# Patient Record
Sex: MALE
[De-identification: names, ages, dates, MRNs, and addresses within clinical notes are randomized per-mention and may not be internally consistent; named-entity substitution may affect disease eponyms.]

---
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
## 2021-05-21 ENCOUNTER — HOSPITAL ENCOUNTER (EMERGENCY)
Dept: HOSPITAL 97 - ER | Age: 21
Discharge: HOME | End: 2021-05-21
Payer: COMMERCIAL

## 2021-05-21 VITALS — SYSTOLIC BLOOD PRESSURE: 115 MMHG | DIASTOLIC BLOOD PRESSURE: 89 MMHG

## 2021-05-21 VITALS — OXYGEN SATURATION: 98 % | TEMPERATURE: 99.2 F

## 2021-05-21 DIAGNOSIS — J06.9: Primary | ICD-10-CM

## 2021-05-21 DIAGNOSIS — Z20.822: ICD-10-CM

## 2021-05-21 LAB — SARS-COV-2 RNA RESP QL NAA+PROBE: NEGATIVE

## 2021-05-21 PROCEDURE — 87070 CULTURE OTHR SPECIMN AEROBIC: CPT

## 2021-05-21 PROCEDURE — 99283 EMERGENCY DEPT VISIT LOW MDM: CPT

## 2021-05-21 PROCEDURE — 87081 CULTURE SCREEN ONLY: CPT

## 2021-05-21 PROCEDURE — 0240U: CPT

## 2021-05-21 NOTE — XMS REPORT
Continuity of Care Document

                             Created on:May 21, 2021



Patient:BRODY HOBBS

Sex:Male

:2000

External Reference #:[id number]





Demographics







                          Address                   [address]



                                                    [place] [address]

 

                          Home Phone                [phone]

 

                          Mobile Phone              [phone]

 

                          Email Address             NONE

 

                          Preferred Language        English

 

                          Marital Status            Unknown

 

                          Adventism Affiliation     Unknown

 

                          Race                      Unknown

 

                          Additional Race(s)        Unavailable



                                                    White

 

                          Ethnic Group              Unknown









Author







                          Organization              [facility]

t

 

                          Address                   [address]



                                                    [place] [address]

 

                          Phone                     [phone]









Support







                Name            Relationship    Address         Phone

 

                Vanessa Temple  Sibling         Unavailable     [phone]

 

                Saranya Knight    Significant Other Unavailable     [phone]

9









Care Team Providers







                    Name                Role                Phone

 

                    Doris Agudelo Attending Clinician [phone]









Problems

This patient has no known problems.



Allergies, Adverse Reactions, Alerts

This patient has no known allergies or adverse reactions.



Medications

This patient has no known medications.



Procedures

This patient has no known procedures.



Encounters







        Start   End     Encounter Admission Attending Care    Care    Encounter 

Source



        Date/Time Date/Time Type    Type    Clinicians Facility Department ID   

   

 

        2021 Office          [facility]    [phone]

[id number] 



        14:15:47 14:45:47 Visit           [facility]  [id number]         



                                                [place]   [id number]         



                                                City    [phone]         



                                                [facility] [address]             



                                                Specialty                 



                                                Care                    







Results

This patient has no known results.

## 2021-05-21 NOTE — EDPHYS
Physician Documentation                                                                           

 [facility]                                                                 

Name: Reynold Mcmillan                                                                          

Age: 20 yrs                                                                                       

Sex: Male                                                                                         

: 2000                                                                                   

MRN: [id number]                                                                                   

Arrival Date: 2021                                                                          

Time: 19:50                                                                                       

Account#: [id number]                                                                            

Bed 24                                                                                            

Private MD:                                                                                       

ED Physician David Garcia                                                                      

HPI:                                                                                              

                                                                                             

23:25 This 20 yrs old  Male presents to ER via Ambulatory with complaints of Flu      kb  

      Symptoms.                                                                                   

23:25 The patient or guardian reports cough, flu symptoms. Onset: The symptoms/episode        kb  

      began/occurred 2 day(s) ago. Severity of symptoms: At their worst the symptoms were         

      mild, moderate, in the emergency department the symptoms are unchanged. Modifying           

      factors: The symptoms are alleviated by nothing, the symptoms are aggravated by             

      nothing. Associated signs and symptoms: Pertinent positives: diarrhea, rhinorrhea, sore     

      throat. The patient has not experienced similar symptoms in the past. The patient has       

      not recently seen a physician. Pt reports cough, rhinorrhea, diarrhea and sore throat       

      for 2 days. Son diagnosed with the flu and spouse with a URI.                               

                                                                                                  

Historical:                                                                                       

- Allergies:                                                                                      

19:55 No Known Allergies;                                                                     vg1 

- Home Meds:                                                                                      

19:55 None [Active];                                                                          vg1 

- PMHx:                                                                                           

19:55 None;                                                                                   vg1 

- PSHx:                                                                                           

19:55 Appendectomy;                                                                           vg1 

                                                                                                  

- Immunization history:: Adult Immunizations up to date.                                          

- Social history:: Smoking status: Patient denies any tobacco usage or history of.                

                                                                                                  

                                                                                                  

ROS:                                                                                              

23:26 Constitutional: Negative for fever, chills, and weight loss.                            kb  

23:26 ENT: Positive for rhinorrhea, sinus congestion, sore throat.                                

23:26 Respiratory: Positive for cough, Negative for dyspnea on exertion, hemoptysis,              

      orthopnea, pleurisy, shortness of breath, sputum production, wheezing.                      

23:26 Abdomen/GI: Positive for diarrhea, Negative for abdominal pain, nausea and vomiting.        

23:26 All other systems are negative.                                                             

                                                                                                  

Exam:                                                                                             

23:24 Constitutional:  This is a well developed, well nourished patient who is awake, alert,  kb  

      and in no acute distress. Head/Face:  Normocephalic, atraumatic. ENT:  Moist Mucous         

      membranes Cardiovascular:  Regular rate and rhythm with a normal S1 and S2.  No             

      gallops, murmurs, or rubs.  No pulse deficits. Respiratory:  Respirations even and          

      unlabored. No increased work of breathing, no retractions or nasal flaring. Skin:           

      Warm, dry with normal turgor.  Normal color. MS/ Extremity:  Pulses equal, no cyanosis.     

       Neurovascular intact.  Full, normal range of motion. Neuro:  Awake and alert, GCS 15,      

      oriented to person, place, time, and situation. Moves all extremities. Normal gait.         

      Psych:  Awake, alert, with orientation to person, place and time.  Behavior, mood, and      

      affect are within normal limits.                                                            

23:24 ENT: Posterior pharynx: is normal.                                                          

                                                                                                  

Vital Signs:                                                                                      

19:53  / 83; Pulse 86; Resp 18; Temp 99.2; Pulse Ox 98% on R/A; Weight 115.67 kg;       vg1 

      Height 5 ft. 8 in. (172.72 cm); Pain 8/10;                                                  

21:00  / 75; Pulse 80; Resp 16; Pulse Ox 98% ;                                          rr5 

22:00  / 89; Pulse 86; Resp 17; Pulse Ox 98% ;                                          rr5 

19:53 Body Mass Index 38.77 (115.67 kg, 172.72 cm)                                            vg1 

                                                                                                  

MDM:                                                                                              

19:54 Patient medically screened.                                                             kb  

23:24 Data reviewed: vital signs, nurses notes. Data interpreted: Pulse oximetry: on room air kb  

      is 98 %. Interpretation: normal. Counseling: I had a detailed discussion with the           

      patient and/or guardian regarding: the historical points, exam findings, and any            

      diagnostic results supporting the discharge/admit diagnosis, lab results, the need for      

      outpatient follow up, a family practitioner, to return to the emergency department if       

      symptoms worsen or persist or if there are any questions or concerns that arise at home.    

                                                                                                  

                                                                                             

19:54 Order name: Strep; Complete Time: 22:14                                                 kb  

                                                                                             

21:15 Order name: COVID-19/FLU A+B; Complete Time: 21:19                                      EDMS

                                                                                             

22:13 Order name: Throat Culture                                                              EDMS

                                                                                                  

Administered Medications:                                                                         

No medications were administered                                                                  

                                                                                                  

                                                                                                  

Disposition:                                                                                      

                                                                                             

04:14 Co-signature as Attending Physician, David Garcia MD.                                 mh7 

                                                                                                  

Disposition:                                                                                      

21 22:15 Discharged to Home. Impression: Acute upper respiratory infection, unspecified.    

- Condition is Stable.                                                                            

- Discharge Instructions: Upper Respiratory Infection, Adult, Easy-to-Read, Viral                 

  Respiratory Infection, Easy-To-Read.                                                            

                                                                                                  

- Medication Reconciliation Form, Thank You Letter, Antibiotic Education, Prescription            

  Opioid Use form.                                                                                

- Follow up: Emergency Department; When: As needed; Reason: Worsening of condition.               

  Follow up: Private Physician; When: 2 - 3 days; Reason: Recheck today's complaints,             

  Continuance of care, Re-evaluation by your physician.                                           

                                                                                                  

                                                                                                  

                                                                                                  

Signatures:                                                                                       

Dispatcher [employer]                           [facility]                                                 

Sejal Priest, ZULEIKAP-C                 FNP-Mohit Harrison, RN                      RN   rr5                                                  

Nai Diamond RN                    RN   vg1                                                  

David Garcia MD MD   mh7                                                  

                                                                                                  

Corrections: (The following items were deleted from the chart)                                    

                                                                                             

20:34 19:54 CORONAVIRUS+MR.LAB.BRZ ordered. [facility]

20:35 19:54 Influenza Screen (A \T\ B)+BA.LAB.BRZ ordered. [facility]

22:22 22:15 2021 22:15 Discharged to Home. Impression: Acute upper respiratory          rr5 

      infection, unspecified. Condition is Stable. Forms are Medication Reconciliation Form,      

      Thank You Letter, Antibiotic Education, Prescription Opioid Use. Follow up: Emergency       

      Department; When: As needed; Reason: Worsening of condition. Follow up: Private             

      Physician; When: 2 - 3 days; Reason: Recheck today's complaints, Continuance of care,       

      Re-evaluation by your physician. kb                                                         

                                                                                                  

**************************************************************************************************

## 2021-08-12 ENCOUNTER — HOSPITAL ENCOUNTER (EMERGENCY)
Dept: HOSPITAL 97 - ER | Age: 21
Discharge: LEFT BEFORE DISCHARGE | End: 2021-08-12
Payer: SELF-PAY

## 2021-08-12 DIAGNOSIS — Z02.9: Primary | ICD-10-CM

## 2021-08-12 NOTE — ER
Nurse's Notes                                                                                     

 [facility]                                                                 

Name: Reynold Mcmillan                                                                          

Age: 20 yrs                                                                                       

Sex: Male                                                                                         

: 2000                                                                                   

MRN: [id number]                                                                                   

Arrival Date: 2021                                                                          

Time: 18:56                                                                                       

Account#: [id number]                                                                            

Bed Waiting                                                                                       

Private MD:                                                                                       

Diagnosis:                                                                                        

                                                                                                  

Presentation:                                                                                     

                                                                                             

19:51 Note registration reports pt left ED.                                                   bb  

                                                                                                  

ED Course:                                                                                        

18:56 Patient arrived in ED.                                                                  as  

                                                                                                  

Administered Medications:                                                                         

No medications were administered                                                                  

                                                                                                  

                                                                                                  

Outcome:                                                                                          

19:51 Patient left the ED.                                                                    bb  

                                                                                                  

Signatures:                                                                                       

Amie Oliver Brenda, RN                     RN   bb                                                   

                                                                                                  

**************************************************************************************************

## 2021-08-12 NOTE — XMS REPORT
Continuity of Care Document

                           Created on:2021



Patient:BRODY HOBBS

Sex:Male

:2000

External Reference #:[id number]





Demographics







                          Address                   [address] [place] [address]

 

                          Home Phone                [phone]

 

                          Mobile Phone              [phone]

 

                          Email Address             [address]

 

                          Preferred Language        English

 

                          Marital Status            Unknown

 

                          Worship Affiliation     Unknown

 

                          Race                      Unknown

 

                          Additional Race(s)        White



                                                    Unavailable

 

                          Ethnic Group              Unknown









Author







                          Organization              [facility]

t

 

                          Address                   [address]



                                                    [place] [address]

 

                          Phone                     [phone]









Support







                Name            Relationship    Address         Phone

 

                Vanessa Temple  Sibling         Unavailable     [phone]

 

                Saranya Knight    Significant Other Unavailable     [phone]

9









Care Team Providers







                    Name                Role                Phone

 

                    Doris Agudelo Attending Clinician [phone]









Problems

This patient has no known problems.



Allergies, Adverse Reactions, Alerts

This patient has no known allergies or adverse reactions.



Medications

This patient has no known medications.



Procedures

This patient has no known procedures.



Encounters







        Start   End     Encounter Admission Attending Care    Care    Encounter 

Source



        Date/Time Date/Time Type    Type    Clinicians Facility Department ID   

   

 

        2021 Office          [facility]    [phone]

[id number] 



        14:15:47 14:45:47 Visit           [id number]  [id number]         



                                                [place]   [id number]         



                                                City    [phone]         



                                                [address]             



                                                Specialty                 



                                                Care                    







Results

This patient has no known results.

## 2022-06-16 ENCOUNTER — HOSPITAL ENCOUNTER (EMERGENCY)
Dept: HOSPITAL 97 - ER | Age: 22
Discharge: HOME | End: 2022-06-16
Payer: SELF-PAY

## 2022-06-16 VITALS — TEMPERATURE: 99.1 F | OXYGEN SATURATION: 99 %

## 2022-06-16 DIAGNOSIS — U07.1: Primary | ICD-10-CM

## 2022-06-16 PROCEDURE — 71045 X-RAY EXAM CHEST 1 VIEW: CPT

## 2022-06-16 PROCEDURE — 87804 INFLUENZA ASSAY W/OPTIC: CPT

## 2022-06-16 PROCEDURE — 99282 EMERGENCY DEPT VISIT SF MDM: CPT

## 2022-06-16 PROCEDURE — 93005 ELECTROCARDIOGRAM TRACING: CPT

## 2022-06-16 NOTE — RAD REPORT
EXAM DESCRIPTION:  RAD - Chest Single View - 6/16/2022 2:10 pm

 

CLINICAL HISTORY:  Congestion

 

COMPARISON:  Chest Pa And Lat (2 Views) dated 1/30/2019

 

FINDINGS:  Lines: None.

Lungs: No evidence of edema or pneumonia.

Pleural: No significant pleural effusions or pneumothorax.

Cardiac: The heart size is within normal limits.

Bones: No acute fractures.

Other:

 

IMPRESSION:  No acute cardiopulmonary disease.

## 2022-06-16 NOTE — EDPHYS
Physician Documentation                                                                           

 [facility]                                                                 

Name: Reynold Mcmillan                                                                          

Age: 21 yrs                                                                                       

Sex: Male                                                                                         

: 2000                                                                                   

MRN: [id number]                                                                                   

Arrival Date: 2022                                                                          

Time: 12:58                                                                                       

Account#: [id number]                                                                            

Bed 11                                                                                            

Private MD:                                                                                       

ED Physician Ronald London                                                                         

HPI:                                                                                              

                                                                                             

14:08 This 21 yrs old  Male presents to ER via Ambulatory with complaints of          kb  

      Shortness Of Breath, Chest Pressure.                                                        

14:08 The patient has shortness of breath at rest. Onset: The symptoms/episode began/occurred kb  

      yesterday. Duration: The symptoms are continuous. The patient's shortness of breath has     

      no apparent modifying factors. Associated signs and symptoms: Pertinent positives:          

      chest pain, non-productive cough. Severity of symptoms: At their worst the symptoms         

      were moderate in the emergency department the symptoms are unchanged. The patient has       

      not experienced similar symptoms in the past. The patient has not recently seen a           

      physician. Pt reports cough, congestion, chest pain and shortness of breath that            

      started yesterday.                                                                          

                                                                                                  

Historical:                                                                                       

- Allergies:                                                                                      

13:07 No Known Allergies;                                                                     iw  

- Home Meds:                                                                                      

13:07 None [Active];                                                                          iw  

- PMHx:                                                                                           

13:07 None;                                                                                   iw  

- PSHx:                                                                                           

13:07 Appendectomy;                                                                           iw  

                                                                                                  

- Immunization history:: Adult Immunizations unknown.                                             

- Social history:: Smoking status: Reported history of juuling and/or vaping.                     

                                                                                                  

                                                                                                  

ROS:                                                                                              

14:06 Constitutional: Negative for fever, chills, and weight loss.                            kb  

14:06 ENT: Positive for sinus congestion.                                                         

14:06 Cardiovascular: Positive for chest pain, Negative for edema, orthopnea, palpitations,       

      paroxysmal nocturnal dyspnea.                                                               

14:06 Respiratory: Positive for cough, shortness of breath.                                       

14:06 All other systems are negative.                                                             

                                                                                                  

Exam:                                                                                             

14:05 Constitutional:  This is a well developed, well nourished patient who is awake, alert,  kb  

      and in no acute distress. Head/Face:  Normocephalic, atraumatic. ENT:  Moist Mucous         

      membranes Cardiovascular:  Regular rate and rhythm with a normal S1 and S2.  No             

      gallops, murmurs, or rubs.  No pulse deficits. Respiratory:  Respirations even and          

      unlabored. No increased work of breathing. Talking in full sentences Abdomen/GI:  Soft,     

      non-tender. No distention Skin:  Warm, dry with normal turgor.  Normal color. MS/           

      Extremity:  Pulses equal, no cyanosis.  Neurovascular intact.  Full, normal range of        

      motion. Neuro:  Awake and alert, GCS 15, oriented to person, place, time, and               

      situation. Moves all extremities. Normal gait. Psych:  Awake, alert, with orientation       

      to person, place and time.  Behavior, mood, and affect are within normal limits.            

14:05 ECG was reviewed by the Attending Physician.                                                

                                                                                                  

Vital Signs:                                                                                      

13:08 Pulse 90; Resp 16; Temp 99.1; Pulse Ox 99% on R/A;                                      iw  

                                                                                                  

MDM:                                                                                              

13:10 Patient medically screened.                                                             kb  

14:06 Data reviewed: vital signs, nurses notes. Data interpreted: Pulse oximetry: on room air kb  

      is 99 %. Interpretation: normal.                                                            

14:20 Counseling: I had a detailed discussion with the patient and/or guardian regarding: the kb  

      historical points, exam findings, and any diagnostic results supporting the                 

      discharge/admit diagnosis, lab results, radiology results, the need for outpatient          

      follow up, a family practitioner, to return to the emergency department if symptoms         

      worsen or persist or if there are any questions or concerns that arise at home.             

                                                                                                  

                                                                                             

13:09 Order name: Flu; Complete Time: 13:51                                                   iw  

                                                                                             

13:09 Order name: SARS-COV-2 RT PCR (Document "Date of Onset" if Symptomatic); Complete Time: iw  

      14:20                                                                                       

                                                                                             

13:16 Order name: EKG - Nurse/Tech; Complete Time: 14:05                                      zm  

                                                                                             

13:18 Order name: CXR XRAY; Complete Time: 14:39                                              iw  

                                                                                                  

EC:05 Rate is 79 beats/min. Rhythm is regular. QRS Axis is Normal. [place] interval is normal at   kb  

      134 msec. QRS interval is normal at 82 msec. QT interval is normal at 394 msec.             

                                                                                                  

Administered Medications:                                                                         

No medications were administered                                                                  

                                                                                                  

                                                                                                  

Disposition:                                                                                      

19:53 Co-signature as Attending Physician, Ronald London DO I was immediately available on-site ms3 

      in the Emergency Department for consultation in the care of the patient..                   

                                                                                                  

Disposition Summary:                                                                              

22 14:47                                                                                    

Discharge Ordered                                                                                 

      Location: Home                                                                          kb  

      Condition: Stable                                                                       kb  

      Diagnosis                                                                                   

        - Coronavirus infection, unspecified                                                  kb  

      Followup:                                                                               kb  

        - With: Emergency Department                                                               

        - When: As needed                                                                          

        - Reason: Worsening of condition                                                           

      Followup:                                                                               kb  

        - With: Private Physician                                                                  

        - When: 2 - 3 days                                                                         

        - Reason: Recheck today's complaints, Continuance of care, Re-evaluation by your           

      physician                                                                                   

      Discharge Instructions:                                                                     

        - Discharge Summary Sheet                                                             kb  

        - Viral Respiratory Infection, Easy-To-Read                                           kb  

        - COVID-19                                                                            kb  

      Forms:                                                                                      

        - Medication Reconciliation Form                                                      kb  

        - Thank You Letter                                                                    kb  

        - Antibiotic Education                                                                kb  

        - Prescription Opioid Use                                                             kb  

        - Work release form                                                                   aa5 

Signatures:                                                                                       

[employer]                           EDSejal Hobson, TANIKA HERNANDES-Aicha Hall RN                     RN   iw                                                   

Ronald London DO DO   ms3                                                  

Karon Oliver                                                   

                                                                                                  

Corrections: (The following items were deleted from the chart)                                    

13:08 13:07 PSHx: None; iw                                                                    iw  

                                                                                                  

**************************************************************************************************

## 2022-06-16 NOTE — ER
Nurse's Notes                                                                                     

 [facility]                                                                 

Name: Reynold Mcmillan                                                                          

Age: 21 yrs                                                                                       

Sex: Male                                                                                         

: 2000                                                                                   

MRN: [id number]                                                                                   

Arrival Date: 2022                                                                          

Time: 12:58                                                                                       

Account#: [id number]                                                                            

Bed 11                                                                                            

Private MD:                                                                                       

Diagnosis: Coronavirus infection, unspecified                                                     

                                                                                                  

Presentation:                                                                                     

                                                                                             

13:07 Chief complaint: Patient states: is having chest pains and SOB, started yesterday , +   iw  

      cough and congestion, last night felt sweaty. Coronavirus screen: Client presents with      

      at least one sign or symptom that may indicate coronavirus-19. Ebola Screen: Patient        

      negative for fever greater than or equal to 101.5 degrees Fahrenheit, and additional        

      compatible Ebola Virus Disease symptoms Patient denies exposure to infectious person.       

      Patient denies travel to an Ebola-affected area in the 21 days before illness onset. No     

      symptoms or risks identified at this time. Initial Sepsis Screen: Does the patient meet     

      any 2 criteria? No. Patient's initial sepsis screen is negative. Does the patient have      

      a suspected source of infection? No. Patient's initial sepsis screen is negative. Risk      

      Assessment: Do you want to hurt yourself or someone else? Patient reports no desire to      

      harm self or others. Onset of symptoms was Faith 15, 2022.                                   

13:07 Method Of Arrival: Ambulatory                                                           iw  

13:08 Acuity: ROSIE 3                                                                           iw  

                                                                                                  

Triage Assessment:                                                                                

15:00 General: Appears in no apparent distress. Behavior is calm, cooperative. Respiratory:   iw  

      Reports cough that is Onset: The symptoms/episode began/occurred yesterday, the patient     

      has mild shortness of breath.                                                               

                                                                                                  

Historical:                                                                                       

- Allergies:                                                                                      

13:07 No Known Allergies;                                                                     iw  

- Home Meds:                                                                                      

13:07 None [Active];                                                                          iw  

- PMHx:                                                                                           

13:07 None;                                                                                   iw  

- PSHx:                                                                                           

13:07 Appendectomy;                                                                           iw  

                                                                                                  

- Immunization history:: Adult Immunizations unknown.                                             

- Social history:: Smoking status: Reported history of juuling and/or vaping.                     

                                                                                                  

                                                                                                  

Screening:                                                                                        

15:00 Abuse screen: Denies threats or abuse. Denies injuries from another. Nutritional        iw  

      screening: No deficits noted. Tuberculosis screening: No symptoms or risk factors           

      identified. Fall Risk None identified.                                                      

                                                                                                  

Assessment:                                                                                       

15:10 Reassessment: Patient is alert, oriented x 3, equal unlabored respirations, skin        aa5 

      warm/dry/pink.                                                                              

15:10 Pain: Denies pain. Cardiovascular: Rhythm is regular. Respiratory: Airway is patent     iw  

      Respiratory effort is even, Breath sounds are clear bilaterally.                            

                                                                                                  

Vital Signs:                                                                                      

13:08 Pulse 90; Resp 16; Temp 99.1; Pulse Ox 99% on R/A;                                      iw  

                                                                                                  

ED Course:                                                                                        

12:58 Patient arrived in ED.                                                                  as  

12:59 [facility] Priest [facility]C [facility]                                                        kb  

12:59 Ronald London DO is Attending Physician.                                                kb  

13:08 Triage completed.                                                                       iw  

13:08 Arm band placed on.                                                                     iw  

13:50 Patient has correct armband on for positive identification.                             iw  

13:54 Aicha López, RN is Primary Nurse.                                                   iw  

14:12 CXR XRAY In Process Unspecified.                                                        EDMS

15:10 No provider procedures requiring assistance completed. Patient did not have IV access   aa5 

      during this emergency room visit.                                                           

                                                                                                  

Administered Medications:                                                                         

No medications were administered                                                                  

                                                                                                  

                                                                                                  

Medication:                                                                                       

16:10 VIS not applicable for this client.                                                     iw  

                                                                                                  

Outcome:                                                                                          

14:47 Discharge ordered by MD.                                                                kb  

15:10 Discharged to home ambulatory.                                                          aa5 

15:10 Condition: good                                                                             

15:10 Discharge instructions given to patient, Instructed on discharge instructions, follow       

      up and referral plans. Demonstrated understanding of instructions, follow-up care.          

15:12 Patient left the ED.                                                                    iw  

                                                                                                  

Signatures:                                                                                       

[employer]                           EDMS                                                 

Sejal Priest FNP-C FNP-Amie Wadsworth                             as                                                   

Aicha López, RN                     RN   iw                                                   

Tereza Gonsales, RN                     RN   aa5                                                  

                                                                                                  

Corrections: (The following items were deleted from the chart)                                    

13:08 13:07 PSHx: None; iw                                                                    iw  

                                                                                                  

**************************************************************************************************

## 2022-06-16 NOTE — XMS REPORT
Continuity of Care Document

                            Created on:2022



Patient:BRODY HOBBS

Sex:Male

:2000

External Reference #:[id number]





Demographics







                          Address                   [address] 



                                                    [place] [address]

 

                          Home Phone                [phone]

 

                          Mobile Phone              [phone]

 

                          Email Address             [email or web address]

 

                          Preferred Language        English

 

                          Marital Status            Unknown

 

                          Spiritism Affiliation     Unknown

 

                          Race                      Unknown

 

                          Additional Race(s)        White



                                                    Unavailable

 

                          Ethnic Group              Unknown









Author







                          Organization              [facility]

t

 

                          Address                   [address]



                                                    [place] [address]

 

                          Phone                     [phone]









Support







                Name            Relationship    Address         Phone

 

                Vanessa Temple  Sibling         Unavailable     [phone]

 

                Saranya Knight    Significant Other Unavailable     [phone]

9









Care Team Providers







                    Name                Role                Phone

 

                    Elena Cole Attending Clinician [phone]

 

                    Rob SCHUMACHER       Attending Clinician [phone]

 

                    ROB           Attending Clinician Unavailable

 

                    Vahe COLES           Attending Clinician [phone]

 

                    ZACK            Attending Clinician Unavailable

 

                    Ulisses FNP,  A       Attending Clinician [phone]

 

                    ULISSES,  A           Attending Clinician Unavailable

 

                    Doctor Unassigned,  Name Attending Clinician Unavailable

 

                    Leonardo               Attending Clinician [phone]

 

                    Prema HERNANDES         Attending Clinician [phone]

 

                    Enoc COLES  S       Attending Clinician [phone]

 

                    Malachi COLES          Attending Clinician [phone]

 

                    PREMA             Attending Clinician Unavailable

 

                    Malachi COLES          Admitting Clinician [phone]









Problems







       Condition Condition Condition Status Onset  Resolution Last   Treating Co

mments 

Source



       Name   Details Category        Date   Date   Treatment Clinician        



                                                 Date                 

 

       Obesity Obesity Disease Active -                             [facility]



       (BMI   (BMI                 8-07                               ity of



       30-39.9) 30-39.9)               00:00:                             [place]



                                   00                                 Medical



                                                                      Branch

 

       Acute  Acute  Disease Active -0                             [facility]



       appendicit appendicit               8-06                               it

y of



       is,    is,                  00:00:                             [place]



       uncomplica uncomplica               00                                 Me

dical



       florence    florence                                                     Branch







Allergies, Adverse Reactions, Alerts







       Allergy Allergy Status Severity Reaction(s) Onset  Inactive Treating Comm

ents 

Source



       Name   Type                        Date   Date   Clinician        

 

       NO KNOWN Drug   Active                                           [facility]



       ALLERGIE Class                                                   ity of



       S                                                              [facility]







Social History







           Social Habit Start Date Stop Date  Quantity   Comments   Source

 

           Sex Assigned At                                             [facility]

y of



           Birth                                                  [facility]

 

           Exposure to                       Not sure              [facility] of



           SARS-CoV-2                                             [facility]



           (event)                                                Branch

 

           Tobacco use and 2021 Current user            [facility]

ity of



           exposure   00:00:00   00:00:00                         [facility]

 

           Tobacco Comment 2020 vapes daily,            [facility]

ity of



                      00:00:00   00:00:00   smokless tobacco            [place] [facility]

dical



                                            twice weekly per            Branch



                                            patient report            









                Smoking Status  Start Date      Stop Date       Source

 

                Current every day smoker 2021 00:00:00                 [facility]

[facility]ity [facility]







Medications







       Ordered Filled Start  Stop   Current Ordering Indication Dosage Frequency

 Signature

                    Comments            Components          Source



     Medication Medication Date Date Medication? Clinician                (SIG) 

          



     Name Name                                                   

 

     HYDROcodone      2021- No             1{tbl}      1 tablet,         

  [facility]



     -acetaminop      2-10 02-10                          Oral,           ity of



     hen (NORCO      05:15: 04:18                          ONCE, 1           [facility]

as



     5) 5-325 mg      00   :00                           dose, Tue           Med

ical



     tablet 1                                         21 at           [place]



     tablet                                         2315, ASAP           

 

     ondansetron      2021- No             4mg       4 mg, Slow          

 [facility]



     (ZOFRAN      -03                          IV Push,           ity of



     (PF))      07:30: 06:35                          ONCE, 1           [place]



     injection 4      00   :00                           dose, Wed           Med

ical



     mg                                           2/3/21 at           Branch



                                                  0130, ASAP           

 

     morpHINE      2021- No             4mg       4 mg, Slow           Un

rachid



     injection 4      03                          IV Push,           ity

 of



     mg        07:30: 06:35                          ONCE, 1           [place]



               00   :00                           dose, Wed           Medical



                                                  2/3/21 at           Branch



                                                  0130, STAT           

 

     ibuprofen            Yes       [id number] 800mg      Take 1         

  [facility]



     800 mg      2-03                [id number]           tablet by           ity of



     tablet      00:00:                               mouth           [place]



               00                                 every 8           Medical



                                                  (eight)           Branch



                                                  hours.           

 

     ibuprofen            Yes       [id number] 800mg      Take 1         

  [facility]



     800 mg      2-03                [id number]           tablet by           ity of



     tablet      00:00:                               mouth           [place]



               00                                 every 8           Medical



                                                  (eight)           Branch



                                                  hours.           

 

     ibuprofen            Yes       [id number] 800mg      Take 1         

  [facility]



     800 mg      2-03                [id number]           tablet by           ity of



     tablet      00:00:                               mouth           [place]



               00                                 every 8           Medical



                                                  (eight)           Branch



                                                  hours.           

 

     ibuprofen      -0      Yes       [id number] 800mg      Take 1         

  [facility]



     800 mg      2-03                [id number]           tablet by           ity of



     tablet      00:00:                               mouth           [place]



               00                                 every 8           Medical



                                                  (eight)           Branch



                                                  hours.           

 

     morpHINE      2020-0 2020- No             4mg       4 mg, Slow           Un

rachid



     injection 4      -08                          IV Push,           ity

 of



     mg        10:49: 11:48                          Q6HPRN,           [place]



               49   :49                           Starting           Medical



                                                  Sat 20           Branch



                                                  at 0549,           



                                                  Until Sat           



                                                  20 at           



                                                  0648,           



                                                  Routine,           



                                                  Pain           



                                                  (scale           



                                                  7-10)           

 

     morpHINE      -0 2020- No             4mg       4 mg, Slow           Un

rachid



     injection 4      -08                          IV Push,           ity

 of



     mg        04:46: 05:14                          Q6HPRN,           [place]



               55   :00                           Starting           Medical



                                                  Fri 20           Branch



                                                  at 2346,           



                                                  Until Sat           



                                                  20 at           



                                                  0014,           



                                                  Routine,           



                                                  Pain           



                                                  (scale           



                                                  7-10)           

 

     HYDROmorpho      -0 2020- No             .5mg      0.5 mg,           Un

rachid



     ne        -                          Slow IV           ity of



     (DILAUDID)      01:00: 01:07                          Push,           [place]



     injection      00   :00                           ONCE, 1           Medical



     0.5 mg                                         dose, Fri           Branch



                                                  20 at           



                                                  2000,           



                                                  Routine<br           



                                                  >Use           



                                                  approved           



                                                  by             



                                                  (Faculty):           



                                                  ADC            



                                                  PROVIDER           

 

     simethicone      -0      Yes            80mg      80 mg,           Univ

ers



     (GAS RELIEF      808                               Oral,           ity of



     (SIMETHICON      00:53:                               Q6HPRN,           [facility]

as



     E))       45                                 Starting           Medical



     chewable                                         Fri 20           Branc

h



     tablet 80                                         at 1953,           



     mg                                           Until           



                                                  Discontinu           



                                                  ed,            



                                                  Routine,           



                                                  Gas            

 

     HYDROcodone      -0      Yes       4647 1{tbl}      Take 1           Un

rachid



     -acetaminop      8-08                               tablet by           ity

 of



     hen 5-325      00:00:                               mouth           [place]



     mg tablet      00                                 every 6           Medical



                                                  (six)           Branch



                                                  hours as           



                                                  needed for           



                                                  Pain           



                                                  (scale           



                                                  7-10).           



                                                  Indication           



                                                  s: acute           



                                                  pain           

 

     ibuprofen      -0      Yes       [id number] 600mg      Take 1           U

nivers



     600 mg      8-08                               tablet by           ity of



     tablet      00:00:                               mouth           [place]



               00                                 every 6           Medical



                                                  (six)           Branch



                                                  hours as           



                                                  needed for           



                                                  Pain           



                                                  (scale           



                                                  4-6).           

 

     HYDROcodone      2020-0      Yes       4647 1{tbl}      Take 1           Un

rachid



     -acetaminop      8-08                               tablet by           ity

 of



     hen 5-325      00:00:                               mouth           [place]



     mg tablet      00                                 every 6           Medical



                                                  (six)           Branch



                                                  hours as           



                                                  needed for           



                                                  Pain           



                                                  (scale           



                                                  7-10).           



                                                  Indication           



                                                  s: acute           



                                                  pain           

 

     ibuprofen      2020-0      Yes       [id number] 600mg      Take 1           U

nivers



     600 mg      8-08                               tablet by           ity of



     tablet      00:00:                               mouth           [place]



               00                                 every 6           Medical



                                                  (six)           Branch



                                                  hours as           



                                                  needed for           



                                                  Pain           



                                                  (scale           



                                                  4-6).           

 

     HYDROcodone      2020-0      Yes       4647 1{tbl}      Take 1           Un

rachid



     -acetaminop      8-08                               tablet by           ity

 of



     hen 5-325      00:00:                               mouth           [place]



     mg tablet      00                                 every 6           Medical



                                                  (six)           Branch



                                                  hours as           



                                                  needed for           



                                                  Pain           



                                                  (scale           



                                                  7-10).           



                                                  Indication           



                                                  s: acute           



                                                  pain           

 

     ibuprofen      2020-0      Yes       [id number] 600mg      Take 1           U

nivers



     600 mg      8-08                               tablet by           ity of



     tablet      00:00:                               mouth           [place]



               00                                 every 6           Medical



                                                  (six)           Branch



                                                  hours as           



                                                  needed for           



                                                  Pain           



                                                  (scale           



                                                  4-6).           

 

     HYDROcodone      2020-0      Yes       4647 1{tbl}      Take 1           Un

rachid



     -acetaminop      8-08                               tablet by           ity

 of



     hen 5-325      00:00:                               mouth           [place]



     mg tablet      00                                 every 6           Medical



                                                  (six)           Branch



                                                  hours as           



                                                  needed for           



                                                  Pain           



                                                  (scale           



                                                  7-10).           



                                                  Indication           



                                                  s: acute           



                                                  pain           

 

     ibuprofen      2020-0      Yes       [id number] 600mg      Take 1           U

nivers



     600 mg      8-08                               tablet by           ity of



     tablet      00:00:                               mouth           [place]



               00                                 every 6           Medical



                                                  (six)           Branch



                                                  hours as           



                                                  needed for           



                                                  Pain           



                                                  (scale           



                                                  4-6).           

 

     HYDROcodone      2020-0      Yes       4647 1{tbl}      Take 1           Un

rachid



     -acetaminop      8-08                               tablet by           ity

 of



     hen 5-325      00:00:                               mouth           [place]



     mg tablet      00                                 every 6           Medical



                                                  (six)           Branch



                                                  hours as           



                                                  needed for           



                                                  Pain           



                                                  (scale           



                                                  7-10).           



                                                  Indication           



                                                  s: acute           



                                                  pain           

 

     ibuprofen      2020-0      Yes       [id number] 600mg      Take 1           U

nivers



     600 mg      8-08                               tablet by           ity of



     tablet      00:00:                               mouth           [place]



               00                                 every 6           Medical



                                                  (six)           Branch



                                                  hours as           



                                                  needed for           



                                                  Pain           



                                                  (scale           



                                                  4-6).           

 

     HYDROcodone      2020-0      Yes       4647 1{tbl}      Take 1           Un

rachid



     -acetaminop      8-08                               tablet by           ity

 of



     hen 5-325      00:00:                               mouth           [place]



     mg tablet      00                                 every 6           Medical



                                                  (six)           Branch



                                                  hours as           



                                                  needed for           



                                                  Pain           



                                                  (scale           



                                                  7-10).           



                                                  Indication           



                                                  s: acute           



                                                  pain           

 

     ibuprofen      2020-0      Yes       [id number] 600mg      Take 1           U

nivers



     600 mg      8-08                               tablet by           ity of



     tablet      00:00:                               mouth           [place]



               00                                 every 6           Medical



                                                  (six)           Branch



                                                  hours as           



                                                  needed for           



                                                  Pain           



                                                  (scale           



                                                  4-6).           

 

     HYDROcodone      2020-0      Yes       4647 1{tbl}      Take 1           Un

rachid



     -acetaminop      8-08                               tablet by           ity

 of



     hen 5-325      00:00:                               mouth           [place]



     mg tablet      00                                 every 6           Medical



                                                  (six)           Branch



                                                  hours as           



                                                  needed for           



                                                  Pain           



                                                  (scale           



                                                  7-10).           



                                                  Indication           



                                                  s: acute           



                                                  pain           

 

     ibuprofen      2020-0      Yes       [id number] 600mg      Take 1           U

nivers



     600 mg      8-08                               tablet by           ity of



     tablet      00:00:                               mouth           [place]



               00                                 every 6           Medical



                                                  (six)           Branch



                                                  hours as           



                                                  needed for           



                                                  Pain           



                                                  (scale           



                                                  4-6).           

 

     HYDROcodone      2020-0      Yes       4647 1{tbl}      Take 1           Un

rachid



     -acetaminop      8-08                               tablet by           ity

 of



     hen 5-325      00:00:                               mouth           [place]



     mg tablet      00                                 every 6           Medical



                                                  (six)           Branch



                                                  hours as           



                                                  needed for           



                                                  Pain           



                                                  (scale           



                                                  7-10).           



                                                  Indication           



                                                  s: acute           



                                                  pain           

 

     ibuprofen      2020-0      Yes       [id number] 600mg      Take 1           U

nivers



     600 mg      8-08                               tablet by           ity of



     tablet      00:00:                               mouth           [place]



               00                                 every 6           Medical



                                                  (six)           Branch



                                                  hours as           



                                                  needed for           



                                                  Pain           



                                                  (scale           



                                                  4-6).           

 

     HYDROcodone      2020-0      Yes       4647 1{tbl}      Take 1           Un

rachid



     -acetaminop      8-08                               tablet by           ity

 of



     hen 5-325      00:00:                               mouth           [place]



     mg tablet      00                                 every 6           Medical



                                                  (six)           Branch



                                                  hours as           



                                                  needed for           



                                                  Pain           



                                                  (scale           



                                                  7-10).           



                                                  Indication           



                                                  s: acute           



                                                  pain           

 

     ibuprofen      2020-0      Yes       [id number] 600mg      Take 1           U

nivers



     600 mg      8-08                               tablet by           ity of



     tablet      00:00:                               mouth           [place]



               00                                 every 6           Medical



                                                  (six)           Branch



                                                  hours as           



                                                  needed for           



                                                  Pain           



                                                  (scale           



                                                  4-6).           

 

     HYDROcodone      2020-0 2020- No        4647 1{tbl}      Take 1           U

nivers



     -acetaminop      8-08 08-08                          tablet by           it

y of



     hen 5-325      00:00: 00:00                          mouth           [place]



     mg tablet      00   :00                           every 6           Medical



                                                  (six)           Branch



                                                  hours as           



                                                  needed for           



                                                  Pain           



                                                  (scale           



                                                  7-10) for           



                                                  up to 7           



                                                  days.           



                                                  Indication           



                                                  s: acute           



                                                  pain           

 

     ibuprofen      2020-0      Yes            600mg      600 mg,           Univ

ers



     (IBU)                                     Oral, Q6H,           ity of



     tablet 600      23:00:                               First dose           [facility]

[facility]



     mg        00                                 on Fri           Medical



                                                  20 at           [facility]anch



                                                  1800,           



                                                  Until           



                                                  Discontinu           



                                                  ed,            



                                                  Routine           

 

     acetaminoph      2020-0      Yes            500mg      500 mg,           Un

rach[facility]



     en        07                               Oral, Q6H,           ity of



     (TYLENOL)      23:00:                               First dose           [facility]

[place]



     tablet 500      00                                 on Fri           [facility]



     mg                                           20 at           [facility]



                                                  1800,           



                                                  Until           



                                                  Discontinu           



                                                  ed,            



                                                  Routine           

 

     HYDROmorpho      -0 2020- No             .2mg      0.2 mg,           Un

rach[facility]



     ne                                  Slow IV           ity of



     (DILAUDID)      22:40: 22:59                          Push,           [place]



     injection      39   :26                           Q5MIN PRN,           Medi

per



     0.2 mg                                         10 doses,           Branch



                                                  Starting           



                                                  20           



                                                  at 1740,           



                                                  Until 20 at           



                                                  1759,           



                                                  Routine,           



                                                  Pain           



                                                  (scale           



                                                  7-10),           



                                                  PACU<br>Us           



                                                  e approved           



                                                  by             



                                                  (Faculty):           



                                                  PACU USE           



                                                  -ANESTHESI           



                                                  A              



                                                  SERVICE-HY           



                                                  DROMORPHON           



                                                  E              



                                                  INJECTIONS           

 

     FENTanyl PF      2020-0 2020- No             25ug      25 mcg,           Un

r[facility]



     (SUBLIMAZE                                Slow IV           ity o

f



     (PF))      21:34: 21:54                          Push,           [place]



     injection      53   :00                           Q5MIN PRN,           Medi

per



     25 mcg                                         4 doses,           Branch



                                                  Starting           



                                                  20           



                                                  at 1634,           



                                                  Until 20 at           



                                                  1654,           



                                                  Routine,           



                                                  Pain           



                                                  (scale           



                                                  7-10),           



                                                  PACU           

 

     morpHINE      2020-0 2020- No             2mg       2 mg, Slow           Un

r[facility]



     injection 2                                IV Push,           ity

 of



     mg        21:15: 04:47                          Q6HPRN,           [place]



               00   :16                           Starting           Medical



                                                  Fri 20           Branch



                                                  at 1615,           



                                                  Until 20 at           



                                                  2347,           



                                                  Routine,           



                                                  Pain           



                                                  (scale           



                                                  7-10)           

 

     traMADol      -0      Yes            100mg      100 mg,           Unive

rs



     (ULTRAM)                                     Oral,           ity of



     tablet 100      21:03:                               Q6HPRN,           [place]

[place]



     mg        48                                 Starting           Medical



                                                  20           [facility]



                                                  at 1603,           



                                                  Until           



                                                  Discontinu           



                                                  ed,            



                                                  Routine,           



                                                  Pain           



                                                  (scale           



                                                  4-6)           

 

     traMADol      2020-0      Yes            50mg      50 mg,           [facility]



     (ULTRAM)      8                               Oral,           ity of



     tablet 50      21:03:                               Q6HPRN,           [place]



     mg        36                                 Starting           Medical



                                                  20           Branch



                                                  at 1603,           



                                                  Until           



                                                  Discontinu           



                                                  ed,            



                                                  Routine,           



                                                  Pain           



                                                  (scale           



                                                  1-3)           

 

     piperacilli      2020-0 2020- No             3.375g      3.375 g,          

 [facility]



     n-tazobacta       0808                          IV             ity of



     m (ZOSYN)      10:00: 16:00                          Piggyback,           [facility]

[facility]



     3.375 g in      00   :21                           Q6H ABX,           Medic

al



     NaCl 0.9%                                         First dose           [facility]

ch



     (NS) 100 mL                                         (after           



     MINI-BAG                                         last           



                                                  reorder)           



                                                  on 20 at           



                                                  0500,           



                                                  Until           



                                                  Discontinu           



                                                  ed, 100           



                                                  mL<br>Reas           



                                                  on for           



                                                  Anti-Infec           



                                                  tive:           



                                                  Documented           



                                                  Infection<           



                                                  br>Documen           



                                                  [place]            



                                                  Infection           



                                                  Site:           



                                                  Abdominal<           



                                                  br>Duratio           



                                                  n of           



                                                  Therapy: 7           



                                                  days           

 

     NaCl 0.9%      0 2020- No             1000mL      at 125           Uni

vers



     (NS) IV                                mL/hr, IV           ity of



     infusion      05:45: 22:52                          Infusion,           [facility]

as



     1,000 mL      00   :44                           CONTINUOUS           Medic

al



                                                  , Starting           Branch



                                                  20           



                                                  at 0045,           



                                                  Until 20 at           



                                                  1752,           



                                                  Routine           

 

     ondansetron      -0      Yes            4mg       4 mg, Slow           

[facility]



     (ZOFRAN                                     IV Push,           ity of



     (PF))      05:42:                               Q6HPRN,           [place]



     injection 4      45                                 Starting           Medi

per



     mg                                           20           Branch



                                                  at 0042,           



                                                  Until           



                                                  Discontinu           



                                                  ed,            



                                                  Routine,           



                                                  Nausea and           



                                                  Vomiting           



                                                  (N/V)           

 

     morpHINE      -0 2020- No             2mg       2 mg, Slow           Un

rachid



     injection 2       08                          IV Push,           ity

 of



     mg        05:42: 21:04                          Q4HPRN,           [place]



               41   :20                           Starting           Medical



                                                  20           Branch



                                                  at 0042,           



                                                  Until 20 at           



                                                  1604,           



                                                  Routine,           



                                                  Pain           



                                                  (scale           



                                                  7-10)           

 

     piperacilli      -0 2020- No             3.375g      3.375 g,          

 [facility]



     n-tazobacta                                IV             ity of



     m (ZOSYN)      04:15: 03:58                          Piggyback,           [facility]

[facility]



     3.375 g in      00   :00                           ONCE, 1           Medica

l



     NaCl 0.9%                                         dose, Thu           Branc

h



     (NS) 100 mL                                         20 at           



     MINI-BAG                                         2315, 100           



                                                  mL<br>Reas           



                                                  on for           



                                                  Anti-Infec           



                                                  tive:           



                                                  Documented           



                                                  Infection<           



                                                  br>Documen           



                                                  [place]            



                                                  Infection           



                                                  Site:           



                                                  Abdominal<           



                                                  br>Duratio           



                                                  n of           



                                                  Therapy: 7           



                                                  days           

 

     iohexol      - 2020- No             120mL      120 mL,           Unive

rs



     (OMNIPAQUE                                Intravenou           it

y of



     350       02:45: 02:45                          s, ONCE, 1           [place]



     BULK-150      00   :00                           dose, Thu           Medica

l



     mL)                                          20 at           [facility]



     injection                                         214,           



     120 mL                                         Routine           

 

     NaCl 0.9%      2020- No             1000mL      at 999           Uni

vers



     (NS) bolus                                mL/hr,           ity of



     infusion      02:30: 04:26                          1,000 mL,           [facility]

as



     1,000 mL      00   :00                           IV             Medical



                                                  Infusion,           [place]



                                                  ONCE, 1           



                                                  dose, Thu           



                                                  20 at           



                                                  2130, ASAP           

 

     ondansetron      2020- No             4mg       4 mg, Slow          

 [facility]



     (ZOFRAN                                IV Push,           ity of



     (PF))      02:30: 01:30                          ONCE, 1           [place]



     injection 4      00   :00                           dose, Thu           Med

ical



     mg                                           20 at           [facility]



                                                  , ASAP           

 

     morpHINE      2020- No             4mg       4 mg, Slow           Un

rachid



     injection 4                                IV Push,           ity

 of



     mg        02:30: 01:30                          ONCE, 1           [place]



               00   :00                           dose, Thu           Medical



                                                  20 at           [facility]



                                                  , STAT           

 

     famotidine      2020- No             20mg      20 mg,           Univ

ers



     (PEPCID                                Slow IV           ity of



     (PF))      02:30: 01:30                          Push,           [place]



     injection      00   :00                           ONCE, 1           Medical



     20 mg                                         dose, u           [facility]anch



                                                  20 at           



                                                  2130, ASAP           







Vital Signs







             Vital Name   Observation Time Observation Value Comments     Source

 

             Systolic blood 2021-02-10 05:40:00 126 mm[Hg]                [facility]

[facility]



             pressure                                            [facility]



                                                                 [facility]

 

             Diastolic blood 2021-02-10 05:40:00 88 mm[Hg]                 [facility]

rsity of



             pressure                                            [facility]



                                                                 [facility]

 

             Heart rate   2021-02-10 05:40:00 102 /min                  [facility]

ty [facility]



                                                                 [place] [facility]



                                                                 [facility]

 

             Respiratory rate 2021-02-10 05:40:00 18 /min                   [facility]iv

ersity of



                                                                 [facility]



                                                                 [facility]

 

             Oxygen saturation in 2021-02-10 05:40:00 99 /min                   

[facility]



             Arterial blood by                                        [place] [facility]

per



             Pulse oximetry                                        [facility]

 

             Body temperature 2021-02-10 03:49:00 37.61 Debora                 [facility]

ersity of



                                                                 [facility]



                                                                 [facility]

 

             Body weight  2021-02-10 03:49:00 108.863 kg                [facility]

[facility]



                                                                 [facility]



                                                                 [facility]

 

             BMI          2021-02-10 03:49:00 37.59 kg/m2               [facility]

ty of



                                                                 [facility]



                                                                 [facility]

 

             Systolic blood 2021 08:03:00 136 mm[Hg]                [facility]iver

sity of



             pressure                                            [facility]



                                                                 Branch

 

             Diastolic blood 2021 08:03:00 91 mm[Hg]                 Unive

rsity of



             pressure                                            [facility]



                                                                 Branch

 

             Heart rate   2021 08:03:00 93 /min                   Universi

ty of



                                                                 [facility]



                                                                 Branch

 

             Respiratory rate 2021 08:03:00 18 /min                   Univ

ersity of



                                                                 [facility]



                                                                 Branch

 

             Oxygen saturation in 2021 08:03:00 99 /min                   

University of



             Arterial blood by                                        [facility]

per



             Pulse oximetry                                        Branch

 

             Body temperature 2021 05:32:00 37.28 Debora                 Univ

ersity of



                                                                 [facility]



                                                                 Branch

 

             Body height  2021 05:32:00 170.2 cm                  Universi

ty of



                                                                 [facility]



                                                                 Branch

 

             Body weight  2021 05:32:00 63.504 kg                 Universi

ty of



                                                                 [facility]



                                                                 Branch

 

             BMI          2021 05:32:00 21.93 kg/m2               Universi

ty of



                                                                 [facility]



                                                                 Branch

 

             Body temperature 2020 18:34:00 36.11 Debora                 Univ

ersity of



                                                                 [facility]



                                                                 Branch

 

             Respiratory rate 2020 18:34:00 18 /min                   Univ

ersity of



                                                                 [facility]



                                                                 Branch

 

             Body weight  2020 18:34:00 89.359 kg                 [facility]

 

             Systolic blood 2020 18:34:00 126 mm[Hg]                Univer

sity of



             pressure                                            [facility]

 

             Diastolic blood 2020 18:34:00 81 mm[Hg]                 Unive

rsity of



             [facility]

 

             Heart [facility]   2020 18:34:00 76 /min                   [facility]

[facility]

 

             Systolic blood 2020 16:02:00 112 mm[Hg]                Univer

sity of



             [facility]

 

             Diastolic blood 2020 16:02:00 57 mm[Hg]                 [facility]

[facility]



             [facility]

 

             Heart rate   2020 16:02:00 74 /min                   [facility]

 

             Body temperature 2020 16:02:00 36.61 Debora                 [facility]iv

[facility]

 

             Respiratory rate 2020 16:02:00 20 /min                   [facility]

 

             Oxygen saturation in 2020 16:02:00 99 /min                   

[facility]



             Arterial blood by                                        [place] [facility]

per



             Pulse oximetry                                        [place]

 

             Body height  2020 14:46:00 170.2 cm                  [facility]

 

             Body weight  2020 14:46:00 90.5 kg                   [facility]

 

             BMI          2020 14:46:00 31.24 kg/m2               [facility]







Procedures







                Procedure       Date / Time     Performing Clinician Source



                                Performed                       

 

                US TESTICULAR TORSION 2021-02-10 05:07:54 Elena Queen [facility]

 

                URINALYSIS      2021-02-10 04:18:00 Elena Queen [facility]

 

                US TESTICULAR TORSION 2021 08:31:51 Ifeanyi Cotter [facility]

 

                HEPATIC FUNCTION PANEL 2021 06:34:00 Ifeanyi Cotter [facility]



                ([id number]) (ALB,T.PRO,BILI                                 [facility] 

Branch



                T,BU/BC,ALT,AST,ALK                                 



                PHOS)                                           

 

                BASIC METABOLIC PANEL 2021 06:34:00 Ifeanyi Cotter [facility]



                (NA, K, CL, CO2,                                 Medical Branch



                GLUCOSE, BUN,                                   



                CREATININE, CA)                                 

 

                CBC WITH DIFF   2021 06:34:00 Ifeanyi Cotter [facility]

 

                PROTHROMBIN TIME / INR 2021 06:34:00 Ifeanyi Cotter [facility]



                                                                Medical [place]

 

                ACTIVATED PARTIAL 2021 06:34:00 Ifeanyi Cotter [facility]



                THRMPLAS SATHYA                                    Medical Branch

 

                COVID-19 (ID NOW RAPID 2021 06:34:00 Ifeanyi Cotter [facility]



                TESTING)                                        Medical Branch

 

                URINALYSIS      2021 05:40:00 Ifeanyi Cotter [facility]

 

                NOTICE OF PRIVACY 2021 05:28:30 Doctor Unassigned, No [facility]



                PRACTICES                       Name            Medical Branch

 

                CONSENT/REFUSAL FOR 2021 05:27:16 Doctor Unassigned, No [facility]

[facility]



                DIAGNOSIS AND TREATMENT                 Name            Medical 

Branch

 

                CONSENT/REFUSAL FOR 2020 18:26:52 Doctor Unassigned, No [facility]

[facility]



                DIAGNOSIS AND TREATMENT                 Name            Medical 

Branch

 

                BASIC METABOLIC PANEL 2020 11:05:00 [facility]



                (NA, K, CL, CO2,                                 Medical Branch



                GLUCOSE, BUN,                                   



                CREATININE, CA)                                 

 

                CBC WITH DIFF   2020 [facility]:05:00 [facility]

 

                CT ABDOMEN PELVIS W 2020 02:39:28 [facility]

[facility] [place]



                CONTRAST                                        Medical Branch

 

                COVID-19 (ID NOW RAPID 2020 02:28:00 [facility]



                TESTING)                                        Medical Branch

 

                URINALYSIS      2020 01:23:00 [facility]

 

                LIPASE          2020 01:22:00 [facility]

 

                HEPATIC FUNCTION PANEL 2020 01:22:00 [facility]



                ([id number]) (ALB,T.PRO,BILI                                 Medical 

Branch



                T,BU/BC,ALT,AST,ALK                                 



                PHOS)                                           

 

                BASIC METABOLIC PANEL 2020 01:22:00 [facility]



                (NA, K, CL, CO2,                                 Medical Branch



                GLUCOSE, BUN,                                   



                CREATININE, CA)                                 

 

                CBC WITH DIFF   2020 01:22:00 Lloyd, David [facility]

 

                NOTICE OF PRIVACY 2020 00:53:21 Doctor Unassigned, No Univ

[facility]



                PRACTICES                       Name            Medical Branch

 

                CONSENT/REFUSAL FOR 2020 00:52:28 Doctor Unassigned, No [facility]

[facility]



                DIAGNOSIS AND TREATMENT                 Name            Medical 

Branch







Encounters







        Start   End     Encounter Admission Attending Care    Care    Encounter 

Source



        Date/Time Date/Time Type    Type    Clinicians Facility Department ID   

   

 

        2021-10-30         Emergency                 [facility]    [id number] 

Univers



        22:41:01                                                         ity of



                                                                        [facility]

 

        2021-10-30         Emergency                 [facility]    [id number] 

Univers



        21:25:25                                                         ity of



                                                                        [facility]

 

        2021 2021-02-10 Emergency         [facility]    [id number]

[id number] Univers



        21:51:00 00:02:00                 Elena Shane [id number]         i

ty [facility] [id number] [place] [address]  [phone]         Medi

per



                                                        084             Branch

 

        2021 [facility]          [facility]    [id number]

[id number] 



        14:15:47 14:45:47 Visit           [employer]  [id number]         



                                                [place]   [address] [place] [address]    [phone]         



                                                Primary & [address]             



                                                Specialty                 



                                                Care                    

 

        2021 Office          [facility]    [id number]

[id number] Univers



        14:15:47 14:45:47 Visit           [employer]  [id number]         it

y of



                                                [place]   [id number] [place] [address]    [phone]         Medi

per



                                                Primary & [address]             Branch



                                                Specialty                 



                                                Care                    

 

        2021 Outpatient BRENNAN ERAZO [facility]    [id number]

[id number] Univers



        14:30:00 14:30:00                 LEORA                  [id number]  ity [facility]

 

        2021 Outpatient BRENNAN ERAZO [facility]    [id number]

[id number] Univers



        14:30:00 14:30:00                 LEORA                          ity [facility]

 

        2021 Emergency         [facility]    [id number]

[id number] Univers



        23:41:00 02:57:00                 Ifeanyi Shane [id number]         i

ty of



                                                [place] 4.2.7.2.686         [place]

s



                                                Adrian  [phone]         Medi

per



                                                        084             [place]

 

        2020 Outpatient R       ZACK [facility]    [id number]

[id number] Univers



        16:15:00 16:15:00                 JANET saltery [facility]

 

        2020 Office          [facility]    [id number]

[id number] Univers



        14:04:13 14:04:13 Visit           Donita Shane [id number]         

ity of



                                                [place] 4.2.7.2.686         [place]

s



                                                [employer] [phone]         Me

[address]                 

 

        [facility]    [id number]

[id number] Univers



        13:30:00 13:30:00                 DONITA saltery [facility]

 

        2020 Orders          Doctor  JUAN M    [id number]

[id number] Univers



        00:00:00 00:00:00 Only            Unassigned, JAIDEN   [id number]       

  ity of



                                        [facility] [facility] 4.2.7.2.686         Matti

as



                                                        [phone]         Medi

per



                                                        [address]             [place]

 

        2020-08-10 2020-08-10 Transition         Amalia Patterson  [id number]

[id number] Univers



        00:00:00 00:00:00 of Care         Liberty Marlow   [id number]        

 ity of



                                                [place]   4.2.7.2.686         [place]

s



                                                        [phone]         Medi

per



                                                        403             Branch

 

        2020 Hospital         [facility]    [id number]

[id number] 

Univers



        19:57:00 14:33:00 Encounter         Mayte Stubbs 350.1.13.1

0         ity of



                                        Tangela Ly [id number]      

   [facility]  [phone]         Medi

per



                                                        081             [place]

 

        2020 2020 Emergency X       [facility]     [id number]

[id number] Univers



        19:57:00 19:57:00                 DAVID kuo of



                                                                        [facility]







Results







           Test Description Test Time  Test Comments Results    Result     Sourc

e



                                                       Comments   

 

           US TESTICULAR 2021              No evidence of            Univer

sity of



           TORSION    0                     testicular torsion.            [facility]



                      05:38:51              Normal spectral and            Branc

h



                                            color                 



                                            Dopplerfindings at            



                                            both testicles.            



                                            Left testicular            



                                            microlithiasis.            



                                            Simple 8 mm right            



                                            epididymal head            



                                            cyst. Nonspecific            



                                            partially calcified            



                                            4 mm extratesticular            



                                            nodularity adjacent            



                                            tothe right            



                                            testicle, likely            



                                            benign. No            



                                            suspicious            



                                            testicular lesions.            



                                            Preliminary Report            



                                            Dictated by            



                                            Resident: Ian Siu MD., have            



                                            reviewed this study            



                                            and agree with the            



                                            abovereport.US            



                                            TESTICULAR TORSION            



                                            HISTORY: left            



                                            testicular pain Had            



                                            US last week, was            



                                            having right            



                                            testicularpain, pain            



                                            now worse .            



                                            TECHNIQUE:            



                                            Testicular            



                                            ultrasound performed            



                                            with grayscale,            



                                            color, andspectral            



                                            Doppler images            



                                            obtained.             



                                            COMPARISON: 2/3/2021            



                                            FINDINGS:  RIGHT            



                                            TESTICLE: Normal            



                                            size, shape, and            



                                            echotexture without            



                                            a focal lesion.The            



                                            right testicle            



                                            measures 4.0 x 1.8 x            



                                            2.8 cm (10.6 mL).            



                                            LEFT TESTICLE:            



                                            Normal size, shape,            



                                            and echotexture            



                                            without a focal            



                                            lesion.Microlithiasi            



                                            s is redemonstrated.            



                                            The left testicle            



                                            measures 4.3 x 3.1            



                                            x2.7 cm (18.9 mL).            



                                            A lobulated            



                                            echogenic             



                                            extratesticular            



                                            nodularity adjacent            



                                            to the righttestes            



                                            on image #34 with            



                                            posterior acoustic            



                                            shadowing measures            



                                            up to 4               



                                            mm,nonspecific but            



                                            likely benign            



                                            pathology.            



                                            EPIDIDYMIDES:            



                                            Unchanged 8 mm            



                                            anechoic right            



                                            epididymal head            



                                            cyst. BLOOD FLOW:            



                                            Bilateral arterial            



                                            waveforms. The blood            



                                            flow is               



                                            grosslysymmetric.            



                                            SCROTUM: No            



                                            hydrocele.            



                                            VARICOCELE: None.            



                                            [facility]            



                                            Results Inft User -            



                                            2021 11:40 PM            



                                            CSTUS TESTICULAR            



                                            TORSIONHISTORY: left            



                                            testicular pain Had            



                                            US last week, was            



                                            having right            



                                            testicularpain, pain            



                                            now worse             



                                            .TECHNIQUE:            



                                            Testicular            



                                            ultrasound performed            



                                            with grayscale,            



                                            color, andspectral            



                                            Doppler images            



                                            obtained.COMPARISON:            



                                            2/3/2021FINDINGS:            



                                            RIGHT TESTICLE:            



                                            Normal size, shape,            



                                            and echotexture            



                                            without a focal            



                                            lesion.The right            



                                            testicle measures            



                                            4.0 x 1.8 x 2.8 cm            



                                            (10.6 mL). LEFT            



                                            TESTICLE: Normal            



                                            size, shape, and            



                                            echotexture without            



                                            a focal               



                                            lesion.Microlithiasi            



                                            s is redemonstrated.            



                                            The left testicle            



                                            measures 4.3 x 3.1            



                                            x2.7 cm (18.9 mL). A            



                                            lobulated echogenic            



                                            extratesticular            



                                            nodularity adjacent            



                                            to the righttestes            



                                            on image #34 with            



                                            posterior acoustic            



                                            shadowing measures            



                                            up to 4               



                                            mm,nonspecific but            



                                            likely benign            



                                            pathology.            



                                            EPIDIDYMIDES:            



                                            Unchanged 8 mm            



                                            anechoic right            



                                            epididymal head            



                                            cyst.BLOOD FLOW:            



                                            Bilateral arterial            



                                            waveforms. The blood            



                                            flow is               



                                            grosslysymmetric.SCR            



                                            OTUM: No              



                                            hydrocele.VARICOCELE            



                                            : None.IMPRESSIONNo            



                                            evidence of            



                                            testicular torsion.            



                                            Normal spectral and            



                                            color                 



                                            Dopplerfindings at            



                                            both testicles. Left            



                                            testicular            



                                            microlithiasis.Simpl            



                                            e 8 mm right            



                                            epididymal head            



                                            cyst.Nonspecific            



                                            partially calcified            



                                            4 mm extratesticular            



                                            nodularity adjacent            



                                            tothe right            



                                            testicle, likely            



                                            benign. No            



                                            suspicious            



                                            testicular            



                                            lesions.Preliminary            



                                            Report Dictated by            



                                            Resident: Ian Hardy MD., have            



                                            reviewed this study            



                                            and agree with the            



                                            abovereport.            









                    URINALYSIS          2021-02-10 04:59:00 









                      Test Item  Value      Reference Range Interpretation Comme

nts









             APPEARANCE (test code = Hazy         Clear        A            



             [id number])                                         

 

             COLOR (test code = 5432837683) Yellow       Yellow                 

   

 

             PH (test code = 2703147472)              4.8-8.0                   

 

             SP GRAVITY (test code =              1.003-1.030               



             0763418318)                                         

 

             GLU U QUAL (test code = Normal       Normal                    



             2587278756)                                         

 

             BLOOD (test code = 2612541762) Negative     Negative               

   

 

             KETONES (test code = 1122117179) Negative     Negative             

     

 

             PROTEIN (test code = 2887-8) Negative     Negative                 

 

 

             UROBILIN (test code = 6803839431) Normal       Normal              

      

 

             BILIRUBIN (test code = Negative     Negative                  



             9800361382)                                         

 

             NITRITE (test code = 8669849417) Negative     Negative             

     

 

             LEUK ROSS (test code = Negative     Negative                  



             8389461617)                                         

 

             RBC/HPF (test code = 5972665812)              See_Comment          

      [Automated message] The



                                                                 system which ge

nerated this



                                                                 result transmit

[place] reference



                                                                 range: 0 - 3 HP

F. The



                                                                 reference range

 was not used



                                                                 to interpret th

is result as



                                                                 normal/abnormal

.

 

             WBC/HPF (test code = 8329898210)              See_Comment          

      [Automated message] The



                                                                 system which ge

nerated this



                                                                 result transmit

[place] reference



                                                                 range: 0 - 5 HP

F. The



                                                                 reference range

 was not used



                                                                 to interpret th

is result as



                                                                 normal/abnormal

.

 

             BACTERIA (test code = 4745812351) Few          Negative     A      

      

 

             AMORPHOUS (test code = Few          Rare HPF     A            



             4101506181)                                         

 

             Lab Interpretation (test code = Abnormal                           

    



             82325-4)                                            



[facility] 
06:59:00





             Test Item    Value        Reference Range Interpretation Comments

 

             SARS-CoV-2 Rapid ID NOW Not Detected Not Detected              



             (test code = 00247-7)                                        

 

             RICKEY (test code = RICKEY) ID NOW COVID-19 Assay                        

   



                          is an isothermal                           



                          nucleic acid                           



                          amplification test                           



                          intended for the                           



                          qualitative detection                           



                          of nucleic acid from                           



                          SARS-CoV-2 viral RNA                           



                          in nasopharyngeal (NP)                           



                          specimens. It is used                           



                          under Emergency Use                           



                          Authorization (EUA) by                           



                          FDA. The limit of                           



                          detection (LOD) of the                           



                          assay is 125 Genome                           



                          Equivalents/mL. A                           



                          positive result is                           



                          indicative of the                           



                          presence of SARS-CoV-2                           



                          RNA. ?Clinical                           



                          correlation with                           



                          patient history and                           



                          other diagnostic                           



                          information is                           



                          necessary to determine                           



                          patient infection                           



                          status. A negative                           



                          (Not Detected) result                           



                          does not preclude                           



                          SARS-CoV-2 infection.                           



                          In patients with                           



                          clinical symptoms and                           



                          other tests that are                           



                          consistent with                           



                          SARS-CoV-2 infection,                           



                          negative results                           



                          should be treated as                           



                          presumptive negative                           



                          and a new specimen                           



                          should be tested with                           



                          alternative PCR                           



                          molecular test.                           



                          Invalid: Please                           



                          collect a new specimen                           



                          for repeat patient                           



                          testing if clinically                           



                          indicated.                             

 

             Lab Interpretation Normal                                 



             (test code = 73658-6)                                        



[facility] Metabolic Panel (NA, K, CL, CO2, 
GLUCOSE, BUN, CREATININE, CA)2021 06:55:00





             Test Item    Value        Reference Range Interpretation Comments

 

             NA (test code = 141 mmol/L   135-145                   



             0715030430)                                         

 

             K (test code = 4.2 mmol/L   3.5-5                     



             5715473906)                                         

 

             CL (test code = 104 mmol/L                       



             1673412626)                                         

 

             CO2 TOTAL (test code = 26 mmol/L    23-31                     



             9018213414)                                         

 

             AGAP (test code =              2-16                      



             9126234518)                                         

 

             BUN (test code = 11 mg/dL     7-23                      



             1783697226)                                         

 

             GLUCOSE (test code = 135 mg/dL           H            



             8713017648)                                         

 

             CREATININE (test code = 0.53 mg/dL   0.6-1.25     L            



             8140268202)                                         

 

             CALCIUM (test code = 9.5 mg/dL    8.6-10.6                  



             2461664144)                                         

 

             eGFR Calculation              mL/min/1.73m2              



             (Non-)                                        



             (test code =                                        



             7264265979)                                         

 

             eGFR Calculation              mL/min/1.73m2              



             (African American)                                        



             (test code =                                        



             6999701142)                                         

 

             RICKEY (test code = RICKEY) Association of                           



                          Glomerular Filtration                           



                          Rate (GFR) and Staging                           



                          of Kidney Disease*                           



                          +---------------------                           



                          --+-------------------                           



                          --+-------------------                           



                          ------+| GFR                           



                          (mL/min/1.73 m2) ?|                           



                          With Kidney Damage ?|                           



                          ?Without Kidney                           



                          Damage+---------------                           



                          --------+-------------                           



                          --------+-------------                           



                          ------------+| ?>90 ?                           



                          ? ? ? ? ? ? ? ?|                           



                          ?Stage one ? ? ? ? ?|                           



                          ? Normal ? ? ? ? ? ? ?                           



                          ?+--------------------                           



                          ---+------------------                           



                          ---+------------------                           



                          -------+| ?60-89 ? ? ?                           



                          ? ? ? ? ?| ?Stage two                           



                          ? ? ? ? ?| ? Decreased                           



                          GFR ? ? ? ?                            



                          +---------------------                           



                          --+-------------------                           



                          --+-------------------                           



                          ------+| ?30-59 ? ? ?                           



                          ? ? ? ? ?| ?Stage                           



                          three ? ? ? ?| ? Stage                           



                          three ? ? ? ? ?                           



                          +---------------------                           



                          --+-------------------                           



                          --+-------------------                           



                          ------+| ?15-29 ? ? ?                           



                          ? ? ? ? ?| ?Stage four                           



                          ? ? ? ? | ? Stage four                           



                          ? ? ? ? ?                              



                          ?+--------------------                           



                          ---+------------------                           



                          ---+------------------                           



                          -------+| ?<15 (or                           



                          dialysis) ? ?| ?Stage                           



                          five ? ? ? ? | ? Stage                           



                          five ? ? ? ? ?                           



                          ?+--------------------                           



                          ---+------------------                           



                          ---+------------------                           



                          -------+ *Each stage                           



                          assumes the associated                           



                          GFR level has been in                           



                          effect for at least                           



                          three months. ?Stages                           



                          1 to 5, with or                           



                          without kidney                           



                          disease, indicate                           



                          chronic kidney                           



                          disease. Notes:                           



                          Determination of                           



                          stages one and two                           



                          (with eGFR                             



                          >59mL/min/1.73 m2)                           



                          requires estimation of                           



                          kidney damage for at                           



                          least three months as                           



                          defined by structural                           



                          or functional                           



                          abnormalities of the                           



                          kidney, manifested by                           



                          either:Pathological                           



                          abnormalities or                           



                          Markers of kidney                           



                          damage (including                           



                          abnormalities in the                           



                          composition of the                           



                          blood or urine or                           



                          abnormalities in                           



                          imaging tests).                           

 

             Lab Interpretation Abnormal                               



             (test code = 60653-9)                                        



[facility] (ALB, T.PRO, BILI T, 
BU/BC, ALT, AST, ALK PHOS)2021 06:55:00





             Test Item    Value        Reference Range Interpretation Comments

 

             TOTAL BILI (test code = 8250533186) 0.5 mg/dL    0.1-1.1           

        

 

             BILI UNCON (test code = 9181506904) 0.2 mg/dL    0.1-1.1           

        

 

             BILI CONJ (test code = 2102372103) 0.0 mg/dL    0-0.3              

       

 

             T PROTEIN (test code = 1061385214) 8.2 g/dL     6.3-8.2            

       

 

             ALBUMIN (test code = 2266254576) 4.8 g/dL     3.5-5                

     

 

             ALK PHOS (test code = 8230791756) 135 U/L             H      

      

 

             ALTv (test code = 1742-6) 51 U/L       5-50         H            

 

             AST(SGOT) (test code = 7069547733) 40 U/L       13-40              

       

 

             Lab Interpretation (test code = Abnormal                           

    



             38872-9)                                            



[facility]





             Test Item    Value        Reference Range Interpretation Comments

 

             APTT Patient (test              See_Comment                [Automat

ed



             code = 3173-2)                                        message] The



                                                                 system which



                                                                 generated this



                                                                 result



                                                                 transmitted



                                                                 reference range

:



                                                                 23 - 38 Seconds

.



                                                                 The reference



                                                                 range was not



                                                                 used to interpr

et



                                                                 this result as



                                                                 normal/abnormal

.

 

             RICKEY (test code = RICKEY) [facility] patient                           



                          population mean                           



                          normal value for                           



                          aPTT is 30                             



                          seconds.                               

 

             Lab Interpretation Normal                                 



             (test code = 40944-4)                                        



[facility] / [id number] 06:51:00





             Test Item    Value        Reference Range Interpretation Comments

 

             PROTIME PATIENT (test              See_Comment                [Auto

mated message]



             code = 5964-2)                                        The system wh

ich



                                                                 generated this 

result



                                                                 transmitted ref

erence



                                                                 range: 12.0 - 1

4.7



                                                                 Seconds. The re

ference



                                                                 range was not u

sed to



                                                                 interpret this 

result



                                                                 as normal/abnor

mal.

 

             INR (test code = 6301-6)                                        Nor

mal INR <1.1;



                                                                 Warfarin Therap

eutic



                                                                 range 2.0 to 3.

0 or



                                                                 2.5 to 3.5, dep

ending



                                                                 upon the indica

tions.

 

             Lab Interpretation (test Normal                                 



             code = 42313-8)                                        



[facility] with [id number] 06:44:00





             Test Item    Value        Reference Range Interpretation Comments

 

             WBC (test code =              See_Comment                [Automated



             5790-2)                                             message] The sy

stem



                                                                 which generated



                                                                 this result



                                                                 transmitted



                                                                 reference range

:



                                                                 4.20 - 10.70



                                                                 10*3/?L. The



                                                                 reference range

 was



                                                                 not used to



                                                                 interpret this



                                                                 result as



                                                                 normal/abnormal

.

 

             RBC (test code =              See_Comment                [Automated



             039-8)                                              message] The sy

stem



                                                                 which generated



                                                                 this result



                                                                 transmitted



                                                                 reference range

:



                                                                 4.26 - 5.52



                                                                 10*6/?L. The



                                                                 reference range

 was



                                                                 not used to



                                                                 interpret this



                                                                 result as



                                                                 normal/abnormal

.

 

             HGB (test code = 15.0 g/dL    12.2-16.4                 



             718-7)                                              

 

             HCT (test code = 44.6 %       38.4-49.3                 



             4544-3)                                             

 

             MCV (test code = 85.0 fL      81.7-95.6                 



             787-2)                                              

 

             MCH (test code = 28.6 pg      26.1-32.7                 



             785-6)                                              

 

             MCHC (test code = 33.6 g/dL    31.2-35                   



             786-4)                                              

 

             RDW-SD (test code = 37.9 fL      38.5-51.6    L            



             50846-8)                                            

 

             RDW-CV (test code = 12.3 %       12.1-15.4                 



             788-0)                                              

 

             PLT (test code =              See_Comment  H             [Automated



             777-3)                                              message] The sy

stem



                                                                 which generated



                                                                 this result



                                                                 transmitted



                                                                 reference range

:



                                                                 150 - 328 10*3/

?L.



                                                                 The reference r

horacio



                                                                 was not used to



                                                                 interpret this



                                                                 result as



                                                                 normal/abnormal

.

 

             MPV (test code = 8.9 fL       9.8-13       L            



             29130-9)                                            

 

             NRBC/100 WBC (test              See_Comment                [Automat

ed



             code = 2803615542)                                        message] 

The system



                                                                 which generated



                                                                 this result



                                                                 transmitted



                                                                 reference range

:



                                                                 0.0 - 10.0 /100



                                                                 WBCs. The refer

ence



                                                                 range was not u

sed



                                                                 to interpret th

is



                                                                 result as



                                                                 normal/abnormal

.

 

             NRBC x10^3 (test code <0.01        See_Comment                [Auto

mated



             = 9892888191)                                        message] The s

ystem



                                                                 which generated



                                                                 this result



                                                                 transmitted



                                                                 reference range

:



                                                                 10*3/?L. The



                                                                 reference range

 was



                                                                 not used to



                                                                 interpret this



                                                                 result as



                                                                 normal/abnormal

.

 

             GRAN MAT (NEUT) % 53.1 %                                 



             (test code = 770-8)                                        

 

             IMM GRAN % (test code 0.50 %                                 



             = 1538011402)                                        

 

             LYMPH % (test code = 36.1 %                                 



             736-9)                                              

 

             MONO % (test code = 7.6 %                                  



             5905-5)                                             

 

             EOS % (test code = 2.3 %                                  



             713-8)                                              

 

             BASO % (test code = 0.4 %                                  



             706-2)                                              

 

             GRAN MAT x10^3(ANC) 4.14 10*3/uL 1.99-6.95                 



             (test code =                                        



             3282821205)                                         

 

             IMM GRAN x10^3 (test 0.04 10*3/uL 0-0.06                    



             code = 9054860697)                                        

 

             LYMPH x10^3 (test code 2.81 10*3/uL 1.09-3.23                 



             = 731-0)                                            

 

             MONO x10^3 (test code 0.59 10*3/uL 0.36-1.02                 



             = 742-7)                                            

 

             EOS x10^3 (test code = 0.18 10*3/uL 0.06-0.53                 



             711-2)                                              

 

             BASO x10^3 (test code 0.03 10*3/uL 0.01-0.09                 



             = 704-7)                                            

 

             Lab Interpretation Abnormal                               



             (test code = 15396-2)                                        



[facility]





             Test Item    Value        Reference Range Interpretation Comments

 

             APPEARANCE (test code = Hazy         Clear        A            



             2726925850)                                         

 

             COLOR (test code = Yellow       Yellow                    



             6751446338)                                         

 

             PH (test code =              4.8-8.0                   



             9058830035)                                         

 

             SP GRAVITY (test code =              1.003-1.030               



             0481589270)                                         

 

             GLU U QUAL (test code = Normal       Normal                    



             5124645494)                                         

 

             BLOOD (test code = Negative     Negative                  



             4510258694)                                         

 

             KETONES (test code = Negative     Negative                  



             9265038326)                                         

 

             PROTEIN (test code = Negative     Negative                  



             2887-8)                                             

 

             UROBILIN (test code = 2.0 mg/dL    Normal       A            



             0791789011)                                         

 

             BILIRUBIN (test code = Negative     Negative                  



             6979684351)                                         

 

             NITRITE (test code = Negative     Negative                  



             4610743841)                                         

 

             LEUK ROSS (test code = Negative     Negative                  



             1072169078)                                         

 

             RBC/HPF (test code =              See_Comment                [Autom

ated message]



             1178667957)                                         The system [employer]



                                                                 generated this



                                                                 result transmit

[place]



                                                                 reference range

: 0 -



                                                                 3 HPF. The refe

rence



                                                                 range was not u

sed



                                                                 to interpret th

is



                                                                 result as



                                                                 normal/abnormal

.

 

             WBC/HPF (test code =              See_Comment                [Autom

ated message]



             2669037055)                                         The system [employer]



                                                                 generated this



                                                                 result transmit

[place]



                                                                 reference range

: 0 -



                                                                 5 HPF. The refe

rence



                                                                 range was not u

sed



                                                                 to interpret th

is



                                                                 result as



                                                                 normal/abnormal

.

 

             BACTERIA (test code = Negative     Negative                  



             5144494045)                                         

 

             MUCOUS (test code = Slight       Negative LPF A            



             8979692610)                                         

 

             AMORPHOUS (test code = Rare         Rare HPF                  



             2059113295)                                         

 

             Lab Interpretation (test Abnormal                               



             code = 15483-4)                                        



[facility] Metabolic Panel (NA, K, CL, CO2, 
GLUCOSE, BUN, CREATININE, CA)2020 12:37:00





             Test Item    Value        Reference Range Interpretation Comments

 

             NA (test code = 136 mmol/L   135-145                   



             6583420303)                                         

 

             K (test code = 4.0 mmol/L   3.5-5                     



             5645658788)                                         

 

             CL (test code = 106 mmol/L                       



             8425624691)                                         

 

             CO2 TOTAL (test code = 23 mmol/L    23-31                     



             5387256702)                                         

 

             AGAP (test code =              2-16                      



             0550354537)                                         

 

             BUN (test code = 10 mg/dL     7-23                      



             6822546170)                                         

 

             GLUCOSE (test code = 122 mg/dL           H            



             4661253711)                                         

 

             CREATININE (test code = 0.61 mg/dL   0.6-1.25                  



             1165217703)                                         

 

             CALCIUM (test code = 9.2 mg/dL    8.6-10.6                  



             1232356528)                                         

 

             eGFR Calculation              mL/min/1.73m2              



             (Non-)                                        



             (test code =                                        



             5933911257)                                         

 

             eGFR Calculation              mL/min/1.73m2              



             (African American)                                        



             (test code =                                        



             0445762193)                                         

 

             RICKEY (test code = RICKEY) Association of                           



                          Glomerular Filtration                           



                          Rate (GFR) and Staging                           



                          of Kidney Disease*                           



                          +---------------------                           



                          --+-------------------                           



                          --+-------------------                           



                          ------+| GFR                           



                          (mL/min/1.73 m2) ?|                           



                          With Kidney Damage ?|                           



                          ?Without Kidney                           



                          Damage+---------------                           



                          --------+-------------                           



                          --------+-------------                           



                          ------------+| ?>90 ?                           



                          ? ? ? ? ? ? ? ?|                           



                          ?Stage one ? ? ? ? ?|                           



                          ? Normal ? ? ? ? ? ? ?                           



                          ?+--------------------                           



                          ---+------------------                           



                          ---+------------------                           



                          -------+| ?60-89 ? ? ?                           



                          ? ? ? ? ?| ?Stage two                           



                          ? ? ? ? ?| ? Decreased                           



                          GFR ? ? ? ?                            



                          +---------------------                           



                          --+-------------------                           



                          --+-------------------                           



                          ------+| ?30-59 ? ? ?                           



                          ? ? ? ? ?| ?Stage                           



                          three ? ? ? ?| ? Stage                           



                          three ? ? ? ? ?                           



                          +---------------------                           



                          --+-------------------                           



                          --+-------------------                           



                          ------+| ?15-29 ? ? ?                           



                          ? ? ? ? ?| ?Stage four                           



                          ? ? ? ? | ? Stage four                           



                          ? ? ? ? ?                              



                          ?+--------------------                           



                          ---+------------------                           



                          ---+------------------                           



                          -------+| ?<15 (or                           



                          dialysis) ? ?| ?Stage                           



                          five ? ? ? ? | ? Stage                           



                          five ? ? ? ? ?                           



                          ?+--------------------                           



                          ---+------------------                           



                          ---+------------------                           



                          -------+ *Each stage                           



                          assumes the associated                           



                          GFR level has been in                           



                          effect for at least                           



                          three months. ?Stages                           



                          1 to 5, with or                           



                          without kidney                           



                          disease, indicate                           



                          chronic kidney                           



                          disease. Notes:                           



                          Determination of                           



                          stages one and two                           



                          (with eGFR                             



                          >59mL/min/1.73 m2)                           



                          requires estimation of                           



                          kidney damage for at                           



                          least three months as                           



                          defined by structural                           



                          or functional                           



                          abnormalities of the                           



                          kidney, manifested by                           



                          either:Pathological                           



                          abnormalities or                           



                          Markers of kidney                           



                          damage (including                           



                          abnormalities in the                           



                          composition of the                           



                          blood or urine or                           



                          abnormalities in                           



                          imaging tests).                           

 

             Lab Interpretation Abnormal                               



             (test code = 90364-5)                                        



[facility] with [id number] 12:27:00





             Test Item    Value        Reference Range Interpretation Comments

 

             WBC (test code =              See_Comment                [Automated



             2290-2)                                             message] The



                                                                 system which



                                                                 generated this



                                                                 result transmit

[place]



                                                                 reference range

:



                                                                 4.50 - 13.50



                                                                 10*3/?L. The



                                                                 reference range



                                                                 was not used to



                                                                 interpret this



                                                                 result as



                                                                 normal/abnormal

.

 

             RBC (test code =              See_Comment                [Automated



             989-8)                                              message] The



                                                                 system which



                                                                 generated this



                                                                 result transmit

[place]



                                                                 reference range

:



                                                                 4.50 - 5.30



                                                                 10*6/?L. The



                                                                 reference range



                                                                 was not used to



                                                                 interpret this



                                                                 result as



                                                                 normal/abnormal

.

 

             HGB (test code = 14.2 g/dL    13-16                     



             718-7)                                              

 

             HCT (test code = 42.1 %       37-49                     



             4544-3)                                             

 

             MCV (test code = 85.9 fL      78-95                     



             787-2)                                              

 

             MCH (test code = 29.0 pg      26-32                     



             785-6)                                              

 

             MCHC (test code = 33.7 g/dL    32-36                     



             786-4)                                              

 

             RDW-SD (test code = 38.9 fL      38.5-49                   



             41021-2)                                            

 

             RDW-CV (test code = 12.4 %       11.5-14                   



             788-0)                                              

 

             PLT (test code =              See_Comment                [Automated



             777-3)                                              message] The



                                                                 system which



                                                                 generated this



                                                                 result transmit

[place]



                                                                 reference range

:



                                                                 133 - 320 10*3/

?L.



                                                                 The reference



                                                                 range was not u

sed



                                                                 to interpret th

is



                                                                 result as



                                                                 normal/abnormal

.

 

             MPV (test code = 9.6 fL       9.3-12.9                  



             95882-7)                                            

 

             NRBC/100 WBC (test              See_Comment                [Automat

ed



             code = 0039485102)                                        message] 

The



                                                                 system which



                                                                 generated this



                                                                 result transmit

[place]



                                                                 reference range

:



                                                                 0.0 - 10.0 /100



                                                                 WBCs. The



                                                                 reference range



                                                                 was not used to



                                                                 interpret this



                                                                 result as



                                                                 normal/abnormal

.

 

             NRBC x10^3 (test code <0.01        See_Comment                [Auto

mated



             = [id number])                                        message] The



                                                                 system which



                                                                 generated this



                                                                 result transmit

[place]



                                                                 reference range

:



                                                                 10*3/?L. The



                                                                 reference range



                                                                 was not used to



                                                                 interpret this



                                                                 result as



                                                                 normal/abnormal

.

 

             GRAN MAT (NEUT) % 81.7 %                                 



             (test code = 770-8)                                        

 

             IMM GRAN % (test code 0.50 %                                 



             = [id number])                                        

 

             LYMPH % (test code = 11.6 %                                 



             736-9)                                              

 

             MONO % (test code = 6.0 %                                  



             5905-5)                                             

 

             EOS % (test code = 0.0 %                                  



             713-8)                                              

 

             BASO % (test code = 0.2 %                                  



             706-2)                                              

 

             GRAN MAT x10^3(ANC) 10.61 10*3/uL 1.5-10.3     H            



             (test code =                                        



             7355940315)                                         

 

             IMM GRAN x10^3 (test 0.06 10*3/uL 0-0.06                    



             code = 4292695887)                                        

 

             LYMPH x10^3 (test code 1.51 10*3/uL 0.7-7.4                   



             = 731-0)                                            

 

             MONO x10^3 (test code 0.78 10*3/uL 0-0.5        H            



             = 742-7)                                            

 

             EOS x10^3 (test code = <0.03        0-0.4                     



             711-2)                                              

 

             BASO x10^3 (test code <0.03        0-0.1                     



             = 704-7)                                            

 

             Lab Interpretation Abnormal                               



             (test code = 69388-8)                                        



[facility] [id number] 
04:43:28 1. ?Fluid-filled, thickened and mildly dilated appendix with 
minimaladjacent fat stranding, consistent with early acute appendicitis. 
Noevidence of perforation or organized collection identified.  Findings were 
conveyed to Dr. Stubbs at 10:05 PM on 2020. Preliminary Report Dictated by 
Resident: Cinthia Lincoln MD., have reviewed this study and 
agree with the abovereport. EXAM: CT ABDOMEN AND PELVIS WITH CONTRAST HISTORY: 
19-year-old male complaints of right lower quadrant pain.COMPARISON: None. 
TECHNIQUE AND FINDINGS: Contiguous axial imaging from the level of the lungbases
through the pubic symphysis was performed after the uncomplicatedadministration 
of 120 cc of intravenous Omnipaque contrast. Coronal andsagittal reconstructions
were obtained. ?Auto mA and/or iterativereconstruction were used to reduce 
radiation dose. FINDINGS: LOWER THORAX: The lungs bases are clear. No 
cardiomegaly. LIVER: No focal hepatic lesions. ?Normal contour. GALLBLADDER AND 
BILIARY TREE: No biliary ductal dilation. The gallbladderis physiologically 
distended. No gallbladder wall thickening. SPLEEN: No splenomegaly. PANCREAS: No
ductal dilation or masses. ADRENAL GLANDS: No adrenal nodules. KIDNEYS: No 
hydronephrosis, stones or masses are identified. PERITONEUM AND RETROPERITONEUM:
No free air or fluid. LYMPH NODES: Scattered bilateral inguinal lymph nodes, 
measuring up to 1cm, possibly reactive. GI TRACT: The appendix is dilated up to 
0.8 cm and is fluid-filled withthickened enhancing wall and minimal adjacent fat
stranding. Noperiappendiceal free fluid, air or organized when compared No
boweldilation or wall thickening. PELVIS/BLADDER: The bladder is decompressed. 
VESSELS: Unremarkable. BONES AND SOFT TISSUES: No suspicious lytic or sclerotic 
bony lesions. [facility], Radiant Results Inft User - 2020 11:44 PM CDTEXAM: CT 
ABDOMEN AND PELVIS WITH CONTRASTHISTORY: 19-year-old male complaints of right 
lower quadrant pain.COMPARISON: None.TECHNIQUE AND FINDINGS: Contiguous axial 
imaging from the level of the lungbases through the pubic symphysis was 
performed after the uncomplicatedadministration of 120 cc of intravenous 
Omnipaque contrast. Coronal andsagittal reconstructions were obtained.  Auto mA 
and/or iterativereconstruction were used to reduce radiation dose.FINDINGS:LOWER
THORAX: The lungs bases are clear. No cardiomegaly.LIVER: No focal hepatic 
lesions.  Normal contour.GALLBLADDER AND BILIARY TREE: No biliary ductal 
dilation. The gallbladderis physiologically distended. No gallbladder wall 
thickening.SPLEEN: No splenomegaly.PANCREAS: No ductal dilation or 
masses.ADRENAL GLANDS: No adrenal nodules.KIDNEYS: No hydronephrosis, stones or 
masses are identified.PERITONEUM AND RETROPERITONEUM: No free air or fluid.LYMPH
NODES: Scattered bilateral inguinal lymph nodes, measuring up to 1cm, possibly 
reactive.GI TRACT: The appendix is dilated up to 0.8 cm and is fluid-filled 
withthickened enhancing wall and minimal adjacent fat stranding. 
Noperiappendiceal free fluid, air or organized when compared No boweldilation or
wall thickening.PELVIS/BLADDER: The bladder is decompressed.VESSELS: 
Unremarkable.BONES AND SOFT TISSUES: No suspicious lytic or sclerotic bony 
lesions.IMPRESSION1.  Fluid-filled, thickened and mildly dilated appendix with 
minimaladjacent fat stranding, consistent with early acute appendicitis. 
Noevidence of perforation or organized collection identified.Findings were 
conveyed to Dr. Stubbs at 10:05 PM on 2020.Preliminary Report Dictated by 
Resident: Alexis Null, Cinthia Fisher MD., have reviewed this study and 
agree with the abovereport.[facility] 
RAPID TEST[facility])2020 03:32:00





             Test Item    Value        Reference Range Interpretation Comments

 

             SARS-CoV-2 Rapid ID NOW Not Detected Not Detected              



             (test code = 47648-8)                                        

 

             RICKEY (test code = RICKEY) ID NOW COVID-19 Assay                        

   



                          is an isothermal                           



                          nucleic acid                           



                          amplification test                           



                          intended for the                           



                          qualitative detection                           



                          of nucleic acid from                           



                          SARS-CoV-2 viral RNA                           



                          in nasopharyngeal (NP)                           



                          specimens. It is used                           



                          under Emergency Use                           



                          Authorization (EUA) by                           



                          FDA. The limit of                           



                          detection (LOD) of the                           



                          assay is 125 Genome                           



                          Equivalents/mL. A                           



                          positive result is                           



                          indicative of the                           



                          presence of SARS-CoV-2                           



                          RNA. ?Clinical                           



                          correlation with                           



                          patient history and                           



                          other diagnostic                           



                          information is                           



                          necessary to determine                           



                          patient infection                           



                          status. A negative                           



                          (Not Detected) result                           



                          does not preclude                           



                          SARS-CoV-2 infection.                           



                          In patients with                           



                          clinical symptoms and                           



                          other tests that are                           



                          consistent with                           



                          SARS-CoV-2 infection,                           



                          negative results                           



                          should be treated as                           



                          presumptive negative                           



                          and a new specimen                           



                          should be tested with                           



                          alternative PCR                           



                          molecular test.                           



                          Invalid: Please                           



                          collect a new specimen                           



                          for repeat patient                           



                          testing if clinically                           



                          indicated.                             

 

             Lab Interpretation Normal                                 



             (test code = 33340-8)                                        



[facility]





             Test Item    Value        Reference Range Interpretation Comments

 

             LIPASE (test code = 9885808914) 15 U/L       0-220                 

    

 

             Lab Interpretation (test code = Normal                             

    



             94390-8)                                            



[facility] (ALB, T.PRO, BILI T, 
BU/BC, ALT, AST, ALK PHOS)2020 02:34:00





             Test Item    Value        Reference Range Interpretation Comments

 

             TOTAL BILI (test code = 5526938820) 0.7 mg/dL    0.1-1.1           

        

 

             BILI UNCON (test code = 1854776311) 0.8 mg/dL    0.1-1.1           

        

 

             BILI CONJ (test code = 2054781031) 0.0 mg/dL    0-0.3              

       

 

             T PROTEIN (test code = 4924149844) 8.8 g/dL     6.3-8.2      H     

       

 

             ALBUMIN (test code = 1053254168) 5.2 g/dL     3.5-5        H       

     

 

             ALK PHOS (test code = 6112090976) 91 U/L                     

      

 

             ALTv (test code = 1742-6) 36 U/L       5-50                      

 

             AST(SGOT) (test code = 5812187770) 30 U/L       13-40              

       

 

             Lab Interpretation (test code = Abnormal                           

    



             49771-3)                                            



[facility]28:00





             Test Item    Value        Reference Range Interpretation Comments

 

             APPEARANCE (test code = Clear        Clear                     



             1739556603)                                         

 

             COLOR (test code = Yellow       Yellow                    



             5406511644)                                         

 

             PH (test code =              4.8-8.0                   



             1649321833)                                         

 

             SP GRAVITY (test code =              1.003-1.030  H            



             5897474017)                                         

 

             GLU U QUAL (test code = Normal       Normal                    



             7739577978)                                         

 

             BLOOD (test code = Negative     Negative                  



             8155594311)                                         

 

             KETONES (test code = 80 mg/dL     Negative     A            



             9159981363)                                         

 

             PROTEIN (test code = 30 mg/dL     Negative     A            



             2887-8)                                             

 

             UROBILIN (test code = Normal       Normal                    



             3368902284)                                         

 

             BILIRUBIN (test code = Negative     Negative                  



             4472221497)                                         

 

             NITRITE (test code = Negative     Negative                  



             2933532662)                                         

 

             LEUK ROSS (test code = Negative     Negative                  



             8766685970)                                         

 

             RBC/HPF (test code =              See_Comment                [Autom

ated message]



             5630498228)                                         The system [employer]



                                                                 generated this 

result



                                                                 transmitted ref

erence



                                                                 range: 0 - 3 HP

F. The



                                                                 reference range

 was



                                                                 not used to int

erpret



                                                                 this result as



                                                                 normal/abnormal

.

 

             WBC/HPF (test code = <1           See_Comment                [Autom

ated message]



             6638236397)                                         The system [employer]



                                                                 generated this 

result



                                                                 transmitted ref

erence



                                                                 range: 0 - 5 HP

F. The



                                                                 reference range

 was



                                                                 not used to int

erpret



                                                                 this result as



                                                                 normal/abnormal

.

 

             BACTERIA (test code = Negative     Negative                  



             6041088893)                                         

 

             MUCOUS (test code = Moderate     Negative LPF A            



             8356815404)                                         

 

             HYAL CAST (test code =              See_Comment                [Aut

omated message]



             4077364213)                                         The system [employer]



                                                                 generated this 

result



                                                                 transmitted ref

erence



                                                                 range: <=2 LPF.

 The



                                                                 reference range

 was



                                                                 not used to int

erpret



                                                                 this result as



                                                                 normal/abnormal

.

 

             Lab Interpretation (test Abnormal                               



             code = 93828-8)                                        



[facility] Metabolic Panel (NA, K, CL, CO2, 
GLUCOSE, BUN, CREATININE, CA)2020 02:17:00





             Test Item    Value        Reference Range Interpretation Comments

 

             NA (test code = 140 mmol/L   135-145                   



             5851512776)                                         

 

             K (test code = 3.9 mmol/L   3.5-5                     



             4893864594)                                         

 

             CL (test code = 102 mmol/L                       



             1432460725)                                         

 

             CO2 TOTAL (test code = 24 mmol/L    23-31                     



             7286208050)                                         

 

             AGAP (test code =              2-16                      



             5174368971)                                         

 

             BUN (test code = 13 mg/dL     7-23                      



             6935393498)                                         

 

             GLUCOSE (test code = 116 mg/dL           H            



             4628879773)                                         

 

             CREATININE (test code = 0.68 mg/dL   0.6-1.25                  



             2571527146)                                         

 

             CALCIUM (test code = 9.9 mg/dL    8.6-10.6                  



             2044746537)                                         

 

             eGFR Calculation              mL/min/1.73m2              



             (Non-)                                        



             (test code =                                        



             7458959988)                                         

 

             eGFR Calculation              mL/min/1.73m2              



             (African American)                                        



             (test code =                                        



             3741143208)                                         

 

             RICKEY (test code = RICKEY) Association of                           



                          Glomerular Filtration                           



                          Rate (GFR) and Staging                           



                          of Kidney Disease*                           



                          +---------------------                           



                          --+-------------------                           



                          --+-------------------                           



                          ------+| GFR                           



                          (mL/min/1.73 m2) ?|                           



                          With Kidney Damage ?|                           



                          ?Without Kidney                           



                          Damage+---------------                           



                          --------+-------------                           



                          --------+-------------                           



                          ------------+| ?>90 ?                           



                          ? ? ? ? ? ? ? ?|                           



                          ?Stage one ? ? ? ? ?|                           



                          ? Normal ? ? ? ? ? ? ?                           



                          ?+--------------------                           



                          ---+------------------                           



                          ---+------------------                           



                          -------+| ?60-89 ? ? ?                           



                          ? ? ? ? ?| ?Stage two                           



                          ? ? ? ? ?| ? Decreased                           



                          GFR ? ? ? ?                            



                          +---------------------                           



                          --+-------------------                           



                          --+-------------------                           



                          ------+| ?30-59 ? ? ?                           



                          ? ? ? ? ?| ?Stage                           



                          three ? ? ? ?| ? Stage                           



                          three ? ? ? ? ?                           



                          +---------------------                           



                          --+-------------------                           



                          --+-------------------                           



                          ------+| ?15-29 ? ? ?                           



                          ? ? ? ? ?| ?Stage four                           



                          ? ? ? ? | ? Stage four                           



                          ? ? ? ? ?                              



                          ?+--------------------                           



                          ---+------------------                           



                          ---+------------------                           



                          -------+| ?<15 (or                           



                          dialysis) ? ?| ?Stage                           



                          five ? ? ? ? | ? Stage                           



                          five ? ? ? ? ?                           



                          ?+--------------------                           



                          ---+------------------                           



                          ---+------------------                           



                          -------+ *Each stage                           



                          assumes the associated                           



                          GFR level has been in                           



                          effect for at least                           



                          three months. ?Stages                           



                          1 to 5, with or                           



                          without kidney                           



                          disease, indicate                           



                          chronic kidney                           



                          disease. Notes:                           



                          Determination of                           



                          stages one and two                           



                          (with eGFR                             



                          >59mL/min/1.73 m2)                           



                          requires estimation of                           



                          kidney damage for at                           



                          least three months as                           



                          defined by structural                           



                          or functional                           



                          abnormalities of the                           



                          kidney, manifested by                           



                          either:Pathological                           



                          abnormalities or                           



                          Markers of kidney                           



                          damage (including                           



                          abnormalities in the                           



                          composition of the                           



                          blood or urine or                           



                          abnormalities in                           



                          imaging tests).                           

 

             Lab Interpretation Abnormal                               



             (test code = 95330-2)                                        



[facility] with [id number] 01:41:00





             Test Item    Value        Reference Range Interpretation Comments

 

             WBC (test code =              See_Comment  H             [Automated



             3312-2)                                             message] The



                                                                 system which



                                                                 generated this



                                                                 result transmit

[place]



                                                                 reference range

:



                                                                 4.50 - 13.50



                                                                 10*3/?L. The



                                                                 reference range



                                                                 was not used to



                                                                 interpret this



                                                                 result as



                                                                 normal/abnormal

.

 

             RBC (test code =              See_Comment                [Automated



             192-8)                                              message] The



                                                                 system which



                                                                 generated this



                                                                 result transmit

[place]



                                                                 reference range

:



                                                                 4.50 - 5.30



                                                                 10*6/?L. The



                                                                 reference range



                                                                 was not used to



                                                                 interpret this



                                                                 result as



                                                                 normal/abnormal

.

 

             HGB (test code = 15.4 g/dL    13-16                     



             718-7)                                              

 

             HCT (test code = 44.8 %       37-49                     



             4544-3)                                             

 

             MCV (test code = 84.5 fL      78-95                     



             787-2)                                              

 

             MCH (test code = 29.1 pg      26-32                     



             785-6)                                              

 

             MCHC (test code = 34.4 g/dL    32-36                     



             786-4)                                              

 

             RDW-SD (test code = 37.9 fL      38.5-49      L            



             56562-7)                                            

 

             RDW-CV (test code = 12.4 %       11.5-14                   



             788-0)                                              

 

             PLT (test code =              See_Comment  H             [Automated



             711-3)                                              message] The



                                                                 system which



                                                                 generated this



                                                                 result transmit

[place]



                                                                 reference range

:



                                                                 133 - 320 10*3/

?L.



                                                                 The reference



                                                                 range was not u

sed



                                                                 to interpret th

is



                                                                 result as



                                                                 normal/abnormal

.

 

             MPV (test code = 9.3 fL       9.3-12.9                  



             51712-3)                                            

 

             NRBC/100 WBC (test              See_Comment                [Automat

ed



             code = 8526956574)                                        message] 

The



                                                                 system which



                                                                 generated this



                                                                 result transmit

[place]



                                                                 reference range

:



                                                                 0.0 - 10.0 /100



                                                                 WBCs. The



                                                                 reference range



                                                                 was not used to



                                                                 interpret this



                                                                 result as



                                                                 normal/abnormal

.

 

             NRBC x10^3 (test code <0.01        See_Comment                [Auto

mated



             = [id number])                                        message] The



                                                                 system which



                                                                 generated this



                                                                 result transmit

[place]



                                                                 reference range

:



                                                                 10*3/?L. The



                                                                 reference range



                                                                 was not used to



                                                                 interpret this



                                                                 result as



                                                                 normal/abnormal

.

 

             GRAN MAT (NEUT) % 85.5 %                                 



             (test code = 770-8)                                        

 

             IMM GRAN % (test code 0.50 %                                 



             = [id number])                                        

 

             LYMPH % (test code = 7.9 %                                  



             736-9)                                              

 

             MONO % (test code = 5.8 %                                  



             5905-5)                                             

 

             EOS % (test code = 0.1 %                                  



             713-8)                                              

 

             BASO % (test code = 0.2 %                                  



             706-2)                                              

 

             GRAN MAT x10^3(ANC) 12.93 10*3/uL 1.5-10.3     H            



             (test code =                                        



             [id number])                                         

 

             IMM GRAN x10^3 (test 0.08 10*3/uL 0-0.06       H            



             code = [id number])                                        

 

             LYMPH x10^3 (test code 1.19 10*3/uL 0.7-7.4                   



             = 731-0)                                            

 

             MONO x10^3 (test code 0.87 10*3/uL 0-0.5        H            



             = 742-7)                                            

 

             EOS x10^3 (test code = <0.03        0-0.4                     



             711-2)                                              

 

             BASO x10^3 (test code 0.03 10*3/uL 0-0.1                     



             = 704-7)                                            

 

             Lab Interpretation Abnormal                               



             (test code = 34289-1)                                        



[facility]

## 2022-06-18 NOTE — EKG
Test Date:    2022-06-16               Test Time:    13:58:11

Technician:   CONCHIS                                    

                                                     

MEASUREMENT RESULTS:                                       

Intervals:                                           

Rate:         79                                     

ID:           [id number]                                    

QRSD:         82                                     

QT:           344                                    

QTc:          394                                    

Axis:                                                

P:            1                                      

ID:           134                                    

QRS:          86                                     

T:            50                                     

                                                     

INTERPRETIVE STATEMENTS:                                       

                                                     

Normal sinus rhythm

Normal ECG

Compared to ECG 01/17/2016 05:04:30

ST (T wave) deviation no longer present



Electronically Signed On 06-18-22 08:55:36 CDT by Erlin Lambert

## 2022-06-21 ENCOUNTER — HOSPITAL ENCOUNTER (EMERGENCY)
Dept: HOSPITAL 97 - ER | Age: 22
Discharge: HOME | End: 2022-06-21
Payer: SELF-PAY

## 2022-06-21 VITALS — TEMPERATURE: 98.6 F | DIASTOLIC BLOOD PRESSURE: 71 MMHG | OXYGEN SATURATION: 100 % | SYSTOLIC BLOOD PRESSURE: 127 MMHG

## 2022-06-21 DIAGNOSIS — U07.1: Primary | ICD-10-CM

## 2022-06-21 NOTE — XMS REPORT
Continuity of Care Document

                            Created on:2022



Patient:BRODY HOBBS

Sex:Male

:2000

External Reference #:[id number]





Demographics







                          Address                   [address] 



                                                    [place] [address]

 

                          Home Phone                [phone]

 

                          Mobile Phone              [phone]

 

                          Email Address             [email or web address]

 

                          Preferred Language        English

 

                          Marital Status            Unknown

 

                          Synagogue Affiliation     Unknown

 

                          Race                      Unknown

 

                          Additional Race(s)        White



                                                    Unavailable

 

                          Ethnic Group              Unknown









Author







                          Organization              [facility]

t

 

                          Address                   [address]



                                                    [place] [address]

 

                          Phone                     [phone]









Support







                Name            Relationship    Address         Phone

 

                Vanessa Temple  Sibling         Unavailable     [phone]

 

                Saranya Knight    Significant Other Unavailable     [phone]

9









Care Team Providers







                    Name                Role                Phone

 

                    Elena Cole Attending Clinician [phone]

 

                    Rob SCHUMACHER       Attending Clinician [phone]

 

                    ROB           Attending Clinician Unavailable

 

                    Vahe COLES           Attending Clinician [phone]

 

                    ZACK            Attending Clinician Unavailable

 

                    Ulisses FNP,  A       Attending Clinician [phone]

 

                    ULISSES,  A           Attending Clinician Unavailable

 

                    Doctor Unassigned,  Name Attending Clinician Unavailable

 

                    Leonardo               Attending Clinician [phone]

 

                    Prema HERNANDES         Attending Clinician [phone]

 

                    Enoc COLES  S       Attending Clinician [phone]

 

                    Malachi COLES          Attending Clinician [phone]

 

                    PREMA             Attending Clinician Unavailable

 

                    Malachi COLES          Admitting Clinician [phone]









Problems







       Condition Condition Condition Status Onset  Resolution Last   Treating Co

mments 

Source



       Name   Details Category        Date   Date   Treatment Clinician        



                                                 Date                 

 

       Obesity Obesity Disease Active -                             [facility]



       (BMI   (BMI                 8-07                               ity of



       30-39.9) 30-39.9)               00:00:                             [place]



                                   00                                 Medical



                                                                      Branch

 

       Acute  Acute  Disease Active -0                             [facility]



       appendicit appendicit               8-06                               it

y of



       is,    is,                  00:00:                             [place]



       uncomplica uncomplica               00                                 Me

dical



       florence    florence                                                     Branch







Allergies, Adverse Reactions, Alerts







       Allergy Allergy Status Severity Reaction(s) Onset  Inactive Treating Comm

ents 

Source



       Name   Type                        Date   Date   Clinician        

 

       NO KNOWN Drug   Active                                           [facility]



       ALLERGIE Class                                                   ity of



       S                                                              [facility]







Social History







           Social Habit Start Date Stop Date  Quantity   Comments   Source

 

           Sex Assigned At                                             [facility]

y of



           Birth                                                  [facility]

 

           Exposure to                       Not sure              [facility] of



           SARS-CoV-2                                             [facility]



           (event)                                                Branch

 

           Tobacco use and 2021 Current user            [facility]

ity of



           exposure   00:00:00   00:00:00                         [facility]

 

           Tobacco Comment 2020 vapes daily,            [facility]

ity of



                      00:00:00   00:00:00   smokless tobacco            [place] [facility]

dical



                                            twice weekly per            Branch



                                            patient report            









                Smoking Status  Start Date      Stop Date       Source

 

                Current every day smoker 2021 00:00:00                 [facility]

[facility]







Medications







       Ordered Filled Start  Stop   Current Ordering Indication Dosage Frequency

 Signature

                    Comments            Components          Source



     Medication Medication Date Date Medication? Clinician                (SIG) 

          



     Name Name                                                   

 

     HYDROcodone      2021- No             1{tbl}      1 tablet,         

  [facility]



     -acetaminop      2-10 02-10                          Oral,           ity of



     hen (NORCO      05:15: 04:18                          ONCE, 1           [facility]

as



     5) 5-325 mg      00   :00                           dose, Tue           Med

ical



     tablet 1                                         21 at           [place]



     tablet                                         2315, ASAP           

 

     ondansetron      2021- No             4mg       4 mg, Slow          

 [facility]



     (ZOFRAN      -03                          IV Push,           ity of



     (PF))      07:30: 06:35                          ONCE, 1           [place]



     injection 4      00   :00                           dose, Wed           Med

ical



     mg                                           2/3/21 at           Branch



                                                  0130, ASAP           

 

     morpHINE      2021- No             4mg       4 mg, Slow           Un

rachid



     injection 4      03                          IV Push,           ity

 of



     mg        07:30: 06:35                          ONCE, 1           [place]



               00   :00                           dose, Wed           Medical



                                                  2/3/21 at           Branch



                                                  0130, STAT           

 

     ibuprofen            Yes       [id number] 800mg      Take 1         

  [facility]



     800 mg      2-03                [id number]           tablet by           ity of



     tablet      00:00:                               mouth           [place]



               00                                 every 8           Medical



                                                  (eight)           Branch



                                                  hours.           

 

     ibuprofen            Yes       [id number] 800mg      Take 1         

  [facility]



     800 mg      2-03                [id number]           tablet by           ity of



     tablet      00:00:                               mouth           [place]



               00                                 every 8           Medical



                                                  (eight)           Branch



                                                  hours.           

 

     ibuprofen            Yes       [id number] 800mg      Take 1         

  [facility]



     800 mg      2-03                [id number]           tablet by           ity of



     tablet      00:00:                               mouth           [place]



               00                                 every 8           Medical



                                                  (eight)           Branch



                                                  hours.           

 

     ibuprofen      -0      Yes       [id number] 800mg      Take 1         

  [facility]



     800 mg      2-03                [id number]           tablet by           ity of



     tablet      00:00:                               mouth           [place]



               00                                 every 8           Medical



                                                  (eight)           Branch



                                                  hours.           

 

     morpHINE      2020-0 2020- No             4mg       4 mg, Slow           Un

rachid



     injection 4      -08                          IV Push,           ity

 of



     mg        10:49: 11:48                          Q6HPRN,           [place]



               49   :49                           Starting           Medical



                                                  Sat 20           Branch



                                                  at 0549,           



                                                  Until Sat           



                                                  20 at           



                                                  0648,           



                                                  Routine,           



                                                  Pain           



                                                  (scale           



                                                  7-10)           

 

     morpHINE      -0 2020- No             4mg       4 mg, Slow           Un

rachid



     injection 4      -08                          IV Push,           ity

 of



     mg        04:46: 05:14                          Q6HPRN,           [place]



               55   :00                           Starting           Medical



                                                  Fri 20           Branch



                                                  at 2346,           



                                                  Until Sat           



                                                  20 at           



                                                  0014,           



                                                  Routine,           



                                                  Pain           



                                                  (scale           



                                                  7-10)           

 

     HYDROmorpho      -0 2020- No             .5mg      0.5 mg,           Un

rachid



     ne        -                          Slow IV           ity of



     (DILAUDID)      01:00: 01:07                          Push,           [place]



     injection      00   :00                           ONCE, 1           Medical



     0.5 mg                                         dose, Fri           Branch



                                                  20 at           



                                                  2000,           



                                                  Routine<br           



                                                  >Use           



                                                  approved           



                                                  by             



                                                  (Faculty):           



                                                  ADC            



                                                  PROVIDER           

 

     simethicone      -0      Yes            80mg      80 mg,           Univ

ers



     (GAS RELIEF      808                               Oral,           ity of



     (SIMETHICON      00:53:                               Q6HPRN,           [facility]

as



     E))       45                                 Starting           Medical



     chewable                                         Fri 20           Branc

h



     tablet 80                                         at 1953,           



     mg                                           Until           



                                                  Discontinu           



                                                  ed,            



                                                  Routine,           



                                                  Gas            

 

     HYDROcodone      -0      Yes       4647 1{tbl}      Take 1           Un

rachid



     -acetaminop      8-08                               tablet by           ity

 of



     hen 5-325      00:00:                               mouth           [place]



     mg tablet      00                                 every 6           Medical



                                                  (six)           Branch



                                                  hours as           



                                                  needed for           



                                                  Pain           



                                                  (scale           



                                                  7-10).           



                                                  Indication           



                                                  s: acute           



                                                  pain           

 

     ibuprofen      -0      Yes       [id number] 600mg      Take 1           U

nivers



     600 mg      8-08                               tablet by           ity of



     tablet      00:00:                               mouth           [place]



               00                                 every 6           Medical



                                                  (six)           Branch



                                                  hours as           



                                                  needed for           



                                                  Pain           



                                                  (scale           



                                                  4-6).           

 

     HYDROcodone      2020-0      Yes       4647 1{tbl}      Take 1           Un

rachid



     -acetaminop      8-08                               tablet by           ity

 of



     hen 5-325      00:00:                               mouth           [place]



     mg tablet      00                                 every 6           Medical



                                                  (six)           Branch



                                                  hours as           



                                                  needed for           



                                                  Pain           



                                                  (scale           



                                                  7-10).           



                                                  Indication           



                                                  s: acute           



                                                  pain           

 

     ibuprofen      2020-0      Yes       [id number] 600mg      Take 1           U

nivers



     600 mg      8-08                               tablet by           ity of



     tablet      00:00:                               mouth           [place]



               00                                 every 6           Medical



                                                  (six)           Branch



                                                  hours as           



                                                  needed for           



                                                  Pain           



                                                  (scale           



                                                  4-6).           

 

     HYDROcodone      2020-0      Yes       4647 1{tbl}      Take 1           Un

rachid



     -acetaminop      8-08                               tablet by           ity

 of



     hen 5-325      00:00:                               mouth           [place]



     mg tablet      00                                 every 6           Medical



                                                  (six)           Branch



                                                  hours as           



                                                  needed for           



                                                  Pain           



                                                  (scale           



                                                  7-10).           



                                                  Indication           



                                                  s: acute           



                                                  pain           

 

     ibuprofen      2020-0      Yes       [id number] 600mg      Take 1           U

nivers



     600 mg      8-08                               tablet by           ity of



     tablet      00:00:                               mouth           [place]



               00                                 every 6           Medical



                                                  (six)           Branch



                                                  hours as           



                                                  needed for           



                                                  Pain           



                                                  (scale           



                                                  4-6).           

 

     HYDROcodone      2020-0      Yes       4647 1{tbl}      Take 1           Un

rachid



     -acetaminop      8-08                               tablet by           ity

 of



     hen 5-325      00:00:                               mouth           [place]



     mg tablet      00                                 every 6           Medical



                                                  (six)           Branch



                                                  hours as           



                                                  needed for           



                                                  Pain           



                                                  (scale           



                                                  7-10).           



                                                  Indication           



                                                  s: acute           



                                                  pain           

 

     ibuprofen      2020-0      Yes       [id number] 600mg      Take 1           U

nivers



     600 mg      8-08                               tablet by           ity of



     tablet      00:00:                               mouth           [place]



               00                                 every 6           Medical



                                                  (six)           Branch



                                                  hours as           



                                                  needed for           



                                                  Pain           



                                                  (scale           



                                                  4-6).           

 

     HYDROcodone      2020-0      Yes       4647 1{tbl}      Take 1           Un

rachid



     -acetaminop      8-08                               tablet by           ity

 of



     hen 5-325      00:00:                               mouth           [place]



     mg tablet      00                                 every 6           Medical



                                                  (six)           Branch



                                                  hours as           



                                                  needed for           



                                                  Pain           



                                                  (scale           



                                                  7-10).           



                                                  Indication           



                                                  s: acute           



                                                  pain           

 

     ibuprofen      2020-0      Yes       [id number] 600mg      Take 1           U

nivers



     600 mg      8-08                               tablet by           ity of



     tablet      00:00:                               mouth           [place]



               00                                 every 6           Medical



                                                  (six)           Branch



                                                  hours as           



                                                  needed for           



                                                  Pain           



                                                  (scale           



                                                  4-6).           

 

     HYDROcodone      2020-0      Yes       4647 1{tbl}      Take 1           Un

rachid



     -acetaminop      8-08                               tablet by           ity

 of



     hen 5-325      00:00:                               mouth           [place]



     mg tablet      00                                 every 6           Medical



                                                  (six)           Branch



                                                  hours as           



                                                  needed for           



                                                  Pain           



                                                  (scale           



                                                  7-10).           



                                                  Indication           



                                                  s: acute           



                                                  pain           

 

     ibuprofen      2020-0      Yes       [id number] 600mg      Take 1           U

nivers



     600 mg      8-08                               tablet by           ity of



     tablet      00:00:                               mouth           [place]



               00                                 every 6           Medical



                                                  (six)           Branch



                                                  hours as           



                                                  needed for           



                                                  Pain           



                                                  (scale           



                                                  4-6).           

 

     HYDROcodone      2020-0      Yes       4647 1{tbl}      Take 1           Un

rachid



     -acetaminop      8-08                               tablet by           ity

 of



     hen 5-325      00:00:                               mouth           [place]



     mg tablet      00                                 every 6           Medical



                                                  (six)           Branch



                                                  hours as           



                                                  needed for           



                                                  Pain           



                                                  (scale           



                                                  7-10).           



                                                  Indication           



                                                  s: acute           



                                                  pain           

 

     ibuprofen      2020-0      Yes       [id number] 600mg      Take 1           U

nivers



     600 mg      8-08                               tablet by           ity of



     tablet      00:00:                               mouth           [place]



               00                                 every 6           Medical



                                                  (six)           Branch



                                                  hours as           



                                                  needed for           



                                                  Pain           



                                                  (scale           



                                                  4-6).           

 

     HYDROcodone      2020-0      Yes       4647 1{tbl}      Take 1           Un

rachid



     -acetaminop      8-08                               tablet by           ity

 of



     hen 5-325      00:00:                               mouth           [place]



     mg tablet      00                                 every 6           Medical



                                                  (six)           Branch



                                                  hours as           



                                                  needed for           



                                                  Pain           



                                                  (scale           



                                                  7-10).           



                                                  Indication           



                                                  s: acute           



                                                  pain           

 

     ibuprofen      2020-0      Yes       [id number] 600mg      Take 1           U

nivers



     600 mg      8-08                               tablet by           ity of



     tablet      00:00:                               mouth           [place]



               00                                 every 6           Medical



                                                  (six)           Branch



                                                  hours as           



                                                  needed for           



                                                  Pain           



                                                  (scale           



                                                  4-6).           

 

     HYDROcodone      2020-0      Yes       4647 1{tbl}      Take 1           Un

rachid



     -acetaminop      8-08                               tablet by           ity

 of



     hen 5-325      00:00:                               mouth           [place]



     mg tablet      00                                 every 6           Medical



                                                  (six)           Branch



                                                  hours as           



                                                  needed for           



                                                  Pain           



                                                  (scale           



                                                  7-10).           



                                                  Indication           



                                                  s: acute           



                                                  pain           

 

     ibuprofen      2020-0      Yes       [id number] 600mg      Take 1           U

nivers



     600 mg      8-08                               tablet by           ity of



     tablet      00:00:                               mouth           [place]



               00                                 every 6           Medical



                                                  (six)           Branch



                                                  hours as           



                                                  needed for           



                                                  Pain           



                                                  (scale           



                                                  4-6).           

 

     HYDROcodone      2020-0 2020- No        4647 1{tbl}      Take 1           U

nivers



     -acetaminop      8-08 08-08                          tablet by           it

y of



     hen 5-325      00:00: 00:00                          mouth           [place]



     mg tablet      00   :00                           every 6           Medical



                                                  (six)           Branch



                                                  hours as           



                                                  needed for           



                                                  Pain           



                                                  (scale           



                                                  7-10) for           



                                                  up to 7           



                                                  days.           



                                                  Indication           



                                                  s: acute           



                                                  pain           

 

     ibuprofen      2020-0      Yes            600mg      600 mg,           Univ

ers



     (IBU)                                     Oral, Q6H,           ity of



     tablet 600      23:00:                               First dose           [facility]

[facility]



     mg        00                                 on Fri           Medical



                                                  20 at           [facility]anch



                                                  1800,           



                                                  Until           



                                                  Discontinu           



                                                  ed,            



                                                  Routine           

 

     acetaminoph      2020-0      Yes            500mg      500 mg,           Un

rach[facility]



     en        07                               Oral, Q6H,           ity of



     (TYLENOL)      23:00:                               First dose           [facility]

[place]



     tablet 500      00                                 on Fri           Medical



     mg                                           20 at           [facility]anch



                                                  1800,           



                                                  Until           



                                                  Discontinu           



                                                  ed,            



                                                  Routine           

 

     HYDROmorpho      -0 2020- No             .2mg      0.2 mg,           Un

rach[facility]



     ne                                  Slow IV           ity of



     (DILAUDID)      22:40: 22:59                          Push,           [place]



     injection      39   :26                           Q5MIN PRN,           Medi

per



     0.2 mg                                         10 doses,           Branch



                                                  Starting           



                                                  20           



                                                  at 1740,           



                                                  Until 20 at           



                                                  1759,           



                                                  Routine,           



                                                  Pain           



                                                  (scale           



                                                  7-10),           



                                                  PACU<br>Us           



                                                  e approved           



                                                  by             



                                                  (Faculty):           



                                                  PACU USE           



                                                  -ANESTHESI           



                                                  A              



                                                  SERVICE-HY           



                                                  DROMORPHON           



                                                  E              



                                                  INJECTIONS           

 

     FENTanyl PF      2020-0 2020- No             25ug      25 mcg,           Un

r[facility]



     (SUBLIMAZE                                Slow IV           ity o

f



     (PF))      21:34: 21:54                          Push,           [place]



     injection      53   :00                           Q5MIN PRN,           Medi

per



     25 mcg                                         4 doses,           Branch



                                                  Starting           



                                                  20           



                                                  at 1634,           



                                                  Until 20 at           



                                                  1654,           



                                                  Routine,           



                                                  Pain           



                                                  (scale           



                                                  7-10),           



                                                  PACU           

 

     morpHINE      2020-0 2020- No             2mg       2 mg, Slow           Un

r[facility]



     injection 2                                IV Push,           ity

 of



     mg        21:15: 04:47                          Q6HPRN,           [place]



               00   :16                           Starting           Medical



                                                  Fri 20           Branch



                                                  at 1615,           



                                                  Until 20 at           



                                                  2347,           



                                                  Routine,           



                                                  Pain           



                                                  (scale           



                                                  7-10)           

 

     traMADol      -0      Yes            100mg      100 mg,           Unive

rs



     (ULTRAM)                                     Oral,           ity of



     tablet 100      21:03:                               Q6HPRN,           [place]

[place]



     mg        48                                 Starting           Medical



                                                  20           [facility]



                                                  at 1603,           



                                                  Until           



                                                  Discontinu           



                                                  ed,            



                                                  Routine,           



                                                  Pain           



                                                  (scale           



                                                  4-6)           

 

     traMADol      2020-0      Yes            50mg      50 mg,           [facility]



     (ULTRAM)      8                               Oral,           ity of



     tablet 50      21:03:                               Q6HPRN,           [place]



     mg        36                                 Starting           Medical



                                                  20           Branch



                                                  at 1603,           



                                                  Until           



                                                  Discontinu           



                                                  ed,            



                                                  Routine,           



                                                  Pain           



                                                  (scale           



                                                  1-3)           

 

     piperacilli      2020-0 2020- No             3.375g      3.375 g,          

 [facility]



     n-tazobacta       0808                          IV             ity of



     m (ZOSYN)      10:00: 16:00                          Piggyback,           [facility]

[facility]



     3.375 g in      00   :21                           Q6H ABX,           Medic

al



     NaCl 0.9%                                         First dose           [facility]

ch



     (NS) 100 mL                                         (after           



     MINI-BAG                                         last           



                                                  reorder)           



                                                  on 20 at           



                                                  0500,           



                                                  Until           



                                                  Discontinu           



                                                  ed, 100           



                                                  mL<br>Reas           



                                                  on for           



                                                  Anti-Infec           



                                                  tive:           



                                                  Documented           



                                                  Infection<           



                                                  br>Documen           



                                                  [place]            



                                                  Infection           



                                                  Site:           



                                                  Abdominal<           



                                                  br>Duratio           



                                                  n of           



                                                  Therapy: 7           



                                                  days           

 

     NaCl 0.9%      0 2020- No             1000mL      at 125           Uni

vers



     (NS) IV                                mL/hr, IV           ity of



     infusion      05:45: 22:52                          Infusion,           [facility]

as



     1,000 mL      00   :44                           CONTINUOUS           Medic

al



                                                  , Starting           Branch



                                                  20           



                                                  at 0045,           



                                                  Until 20 at           



                                                  1752,           



                                                  Routine           

 

     ondansetron      -0      Yes            4mg       4 mg, Slow           

[facility]



     (ZOFRAN                                     IV Push,           ity of



     (PF))      05:42:                               Q6HPRN,           [place]



     injection 4      45                                 Starting           Medi

per



     mg                                           20           Branch



                                                  at 0042,           



                                                  Until           



                                                  Discontinu           



                                                  ed,            



                                                  Routine,           



                                                  Nausea and           



                                                  Vomiting           



                                                  (N/V)           

 

     morpHINE      -0 2020- No             2mg       2 mg, Slow           Un

rachid



     injection 2       08                          IV Push,           ity

 of



     mg        05:42: 21:04                          Q4HPRN,           [place]



               41   :20                           Starting           Medical



                                                  20           Branch



                                                  at 0042,           



                                                  Until 20 at           



                                                  1604,           



                                                  Routine,           



                                                  Pain           



                                                  (scale           



                                                  7-10)           

 

     piperacilli      -0 2020- No             3.375g      3.375 g,          

 [facility]



     n-tazobacta                                IV             ity of



     m (ZOSYN)      04:15: 03:58                          Piggyback,           [facility]

[facility]



     3.375 g in      00   :00                           ONCE, 1           Medica

l



     NaCl 0.9%                                         dose, Thu           Branc

h



     (NS) 100 mL                                         20 at           



     MINI-BAG                                         2315, 100           



                                                  mL<br>Reas           



                                                  on for           



                                                  Anti-Infec           



                                                  tive:           



                                                  Documented           



                                                  Infection<           



                                                  br>Documen           



                                                  [place]            



                                                  Infection           



                                                  Site:           



                                                  Abdominal<           



                                                  br>Duratio           



                                                  n of           



                                                  Therapy: 7           



                                                  days           

 

     iohexol      - 2020- No             120mL      120 mL,           Unive

rs



     (OMNIPAQUE                                Intravenou           it

y of



     350       02:45: 02:45                          s, ONCE, 1           [place]



     BULK-150      00   :00                           dose, Thu           Medica

l



     mL)                                          20 at           [facility]anch



     injection                                         214,           



     120 mL                                         Routine           

 

     NaCl 0.9%      2020- No             1000mL      at 999           Uni

vers



     (NS) bolus                                mL/hr,           ity of



     infusion      02:30: 04:26                          1,000 mL,           [facility]

as



     1,000 mL      00   :00                           IV             Medical



                                                  Infusion,           [place]



                                                  ONCE, 1           



                                                  dose, Thu           



                                                  20 at           



                                                  2130, ASAP           

 

     ondansetron      2020- No             4mg       4 mg, Slow          

 [facility]



     (ZOFRAN                                IV Push,           ity of



     (PF))      02:30: 01:30                          ONCE, 1           [place]



     injection 4      00   :00                           dose, Thu           Med

ical



     mg                                           20 at           [facility]



                                                  , ASAP           

 

     morpHINE      2020- No             4mg       4 mg, Slow           Un

rachid



     injection 4                                IV Push,           ity

 of



     mg        02:30: 01:30                          ONCE, 1           [place]



               00   :00                           dose, Thu           Medical



                                                  20 at           [facility]



                                                  , STAT           

 

     famotidine      2020- No             20mg      20 mg,           Univ

ers



     (PEPCID                                Slow IV           ity of



     (PF))      02:30: 01:30                          Push,           [place]



     injection      00   :00                           ONCE, 1           Medical



     20 mg                                         dose, u           [facility]anch



                                                  20 at           



                                                  2130, ASAP           







Vital Signs







             Vital Name   Observation Time Observation Value Comments     Source

 

             Systolic blood 2021-02-10 05:40:00 126 mm[Hg]                [facility]

[facility]ity of



             pressure                                            [facility]



                                                                 [facility]

 

             Diastolic blood 2021-02-10 05:40:00 88 mm[Hg]                 [facility]

rsity of



             pressure                                            [facility]



                                                                 [facility]

 

             Heart rate   2021-02-10 05:40:00 102 /min                  [facility]

ty [facility]



                                                                 [place] [facility]



                                                                 [facility]

 

             Respiratory rate 2021-02-10 05:40:00 18 /min                   [facility]iv

ersity of



                                                                 [place] [facility]



                                                                 [facility]

 

             Oxygen saturation in 2021-02-10 05:40:00 99 /min                   

[facility]



             Arterial blood by                                        [place] [facility]

per



             Pulse oximetry                                        [facility]

 

             Body temperature 2021-02-10 03:49:00 37.61 Debora                 [facility]

ersity of



                                                                 [facility]



                                                                 [facility]

 

             Body weight  2021-02-10 03:49:00 108.863 kg                [facility]

[facility]



                                                                 [facility]



                                                                 [facility]

 

             BMI          2021-02-10 03:49:00 37.59 kg/m2               [facility]

ty of



                                                                 [facility]



                                                                 [facility]

 

             Systolic blood 2021 08:03:00 136 mm[Hg]                Univer

sity of



             pressure                                            [facility]



                                                                 Branch

 

             Diastolic blood 2021 08:03:00 91 mm[Hg]                 Unive

rsity of



             pressure                                            [facility]



                                                                 Branch

 

             Heart rate   2021 08:03:00 93 /min                   Universi

ty of



                                                                 [facility]



                                                                 Branch

 

             Respiratory rate 2021 08:03:00 18 /min                   Univ

ersity of



                                                                 [facility]



                                                                 Branch

 

             Oxygen saturation in 2021 08:03:00 99 /min                   

University of



             Arterial blood by                                        [place] [facility]

per



             Pulse oximetry                                        Branch

 

             Body temperature 2021 05:32:00 37.28 Debora                 Univ

ersity of



                                                                 [facility]



                                                                 Branch

 

             Body height  2021 05:32:00 170.2 cm                  Universi

ty of



                                                                 [facility]



                                                                 Branch

 

             Body weight  2021 05:32:00 63.504 kg                 Universi

ty of



                                                                 [facility]



                                                                 Branch

 

             BMI          2021 05:32:00 21.93 kg/m2               Universi

ty of



                                                                 [facility]



                                                                 Branch

 

             Body temperature 2020 18:34:00 36.11 Debora                 Univ

ersity of



                                                                 [facility]



                                                                 Branch

 

             Respiratory rate 2020 18:34:00 18 /min                   Univ

ersity of



                                                                 [facility]



                                                                 Branch

 

             Body weight  2020 18:34:00 89.359 kg                 [facility]

 

             Systolic blood 2020 18:34:00 126 mm[Hg]                Univer

sity of



             pressure                                            [facility]

 

             Diastolic blood 2020 18:34:00 81 mm[Hg]                 Unive

rsity of



             [facility]

 

             Heart rate   2020 18:34:00 76 /min                   [facility]

[facility]

 

             Systolic blood 2020 16:02:00 112 mm[Hg]                Univer

sity of



             [facility]

 

             Diastolic blood 2020 16:02:00 57 mm[Hg]                 [facility]

[facility]



             [facility]

 

             Heart rate   2020 16:02:00 74 /min                   [facility]

 

             Body temperature 2020 16:02:00 36.61 Debora                 [facility]iv

[facility]

 

             Respiratory rate 2020 16:02:00 20 /min                   [facility]

 

             Oxygen saturation in 2020 16:02:00 99 /min                   

[facility]



             Arterial blood by                                        [place] [facility]

per



             Pulse oximetry                                        [place]

 

             Body height  2020 14:46:00 170.2 cm                  [facility]

 

             Body weight  2020 14:46:00 90.5 kg                   [facility]

 

             BMI          2020 14:46:00 31.24 kg/m2               [facility]







Procedures







                Procedure       Date / Time     Performing Clinician Source



                                Performed                       

 

                US TESTICULAR TORSION 2021-02-10 05:07:54 Elena [facility]

 

                URINALYSIS      2021-02-10 04:18:00 Elena Queen [facility]

 

                US TESTICULAR TORSION 2021 08:31:51 Ifeanyi [facility]

 

                HEPATIC FUNCTION PANEL 2021 06:34:00 Ifeanyi Cotter [facility]



                ([id number]) (ALB,T.PRO,BILI                                 [facility] 

Branch



                T,BU/BC,ALT,AST,ALK                                 



                PHOS)                                           

 

                BASIC METABOLIC PANEL 2021 06:34:00 Ifeanyi Cotter [facility]



                (NA, K, CL, CO2,                                 Medical Branch



                GLUCOSE, BUN,                                   



                CREATININE, CA)                                 

 

                CBC WITH DIFF   2021 06:34:00 Ifeanyi Cotter [facility]

 

                PROTHROMBIN TIME / INR 2021 06:34:00 Ifeanyi Cotter [facility]



                                                                Medical [place]

 

                ACTIVATED PARTIAL 2021 06:34:00 Ifeanyi Cotter [facility]



                THRMPLAS SATHYA                                    Medical Branch

 

                COVID-19 (ID NOW RAPID 2021 06:34:00 Ifeanyi Cotter [facility]



                TESTING)                                        Medical Branch

 

                URINALYSIS      2021 05:40:00 Ifeanyi Cotter [facility]

 

                NOTICE OF PRIVACY 2021 05:28:30 Doctor Unassigned, No [facility]



                PRACTICES                       Name            Medical Branch

 

                CONSENT/REFUSAL FOR 2021 05:27:16 Doctor Unassigned, No [facility]

[facility]



                DIAGNOSIS AND TREATMENT                 Name            Medical 

Branch

 

                CONSENT/REFUSAL FOR 2020 18:26:52 Doctor Unassigned, No [facility]

[facility]



                DIAGNOSIS AND TREATMENT                 Name            Medical 

Branch

 

                BASIC METABOLIC PANEL 2020 11:05:00 [facility]



                (NA, K, CL, CO2,                                 Medical Branch



                GLUCOSE, BUN,                                   



                CREATININE, CA)                                 

 

                CBC WITH DIFF   2020 11:05:00 [facility]

 

                CT ABDOMEN PELVIS W 2020 02:39:28 [facility]

ity of [place]



                CONTRAST                                        Medical Branch

 

                COVID-19 (ID NOW RAPID 2020 02:28:00 [facility]



                TESTING)                                        Medical Branch

 

                URINALYSIS      2020 01:23:00 [facility]

 

                LIPASE          2020 01:22:00 [facility] [place]

 

                HEPATIC FUNCTION PANEL 2020 01:22:00 [facility]



                ([id number]) (ALB,T.PRO,BILI                                 Medical 

Branch



                T,BU/BC,ALT,AST,ALK                                 



                PHOS)                                           

 

                BASIC METABOLIC PANEL 2020 01:22:00 [facility]



                (NA, K, CL, CO2,                                 Medical Branch



                GLUCOSE, BUN,                                   



                CREATININE, CA)                                 

 

                CBC WITH DIFF   2020 01:22:00 Lloyd, David [facility]

 

                NOTICE OF PRIVACY 2020 00:53:21 Doctor Unassigned, No [facility]

[facility]



                PRACTICES                       Name            Medical Branch

 

                CONSENT/REFUSAL FOR 2020 00:52:28 Doctor Unassigned, No [facility]

[facility]



                DIAGNOSIS AND TREATMENT                 Name            Medical 

Branch







Encounters







        Start   End     Encounter Admission Attending Care    Care    Encounter 

Source



        Date/Time Date/Time Type    Type    Clinicians Facility Department ID   

   

 

        2021-10-30         Emergency                 [facility]    [id number] 

Univers



        22:41:01                                                         ity of



                                                                        [facility]

 

        2021-10-30         Emergency                 [facility]    [id number] 

Univers



        21:25:25                                                         ity of



                                                                        [facility]

 

        2021 2021-02-10 Emergency         [facility]    [id number]

[id number] Univers



        21:51:00 00:02:00                 Elena Shane [id number]         i

ty [facility] [id number] [place] [address]  [phone]         Medi

per



                                                        084             Branch

 

        2021 [facility]          [facility]    [id number]

[id number] 



        14:15:47 14:45:47 Visit           [employer]  [id number]         



                                                [place]   [address] [place] [address]    [phone]         



                                                Primary & [address]             



                                                Specialty                 



                                                Care                    

 

        2021 Office          [facility]    [id number]

[id number] Univers



        14:15:47 14:45:47 Visit           [employer]  [id number]         it

y of



                                                [place]   [address] [place] [address]    [phone]         Medi

per



                                                Primary & [address]             Branch



                                                Specialty                 



                                                Care                    

 

        2021 Outpatient BRENNAN ERAZO [facility]    [id number]

[id number] Univers



        14:30:00 14:30:00                 LEORA                  [id number]  ity [facility]

 

        2021 Outpatient BRENNAN ERAZO [facility]    [id number]

[id number] Univers



        14:30:00 14:30:00                 LEORA                          ity [facility]

 

        2021 Emergency         [facility]    [id number]

[id number] Univers



        23:41:00 02:57:00                 Ifeanyi Shane [id number]         i

ty of



                                                [place] 4.2.7.2.686         [place]

s



                                                [place]  [phone]         Medi

per



                                                        [address]             [place]

 

        2020 Outpatient R       ZACK [facility]    [id number]

[id number] Univers



        16:15:00 16:15:00                 JANET saltery [facility]

 

        2020 Office          [facility]    [id number]

[id number] Univers



        14:04:13 14:04:13 Visit           Donita Shane [id number]         

ity of



                                                [place] 4.2.7.2.686         [place]

s



                                                [place] [phone]         Me

[address]                 

 

        2020 [facility] R       [facility]    [id number]

[id number] Univers



        13:30:00 13:30:00                 DNOITA saltery [facility]

 

        2020 Orders          Doctor  JUAN M    [id number]

[id number] Univers



        00:00:00 00:00:00 Only            Unassigned, JAIDEN   [id number]       

  ity of



                                        [facility] 4.2.7.2.686         Matti

as



                                                        [phone]         Medi

per



                                                        [address]             [place]

 

        2020-08-10 2020-08-10 Transition         Amalia Patterson  [id number]

[id number] Univers



        00:00:00 00:00:00 of Care         Liberty Marlow   [id number]        

 ity of



                                                [place]   [id number]         [place]

s



                                                        [phone]         Medi

per



                                                        403             Branch

 

        2020 Hospital         [facility]    [id number]

[id number] 

Univers



        19:57:00 14:33:00 Encounter         Mayte Stubbs 350.1.13.1

0         ity of



                                        Tangela Ly [id number]      

   [facility]  [phone]         Medi

per



                                                        081             [place]

 

        2020 2020 Emergency [facility]    ERT     [id number]

[id number] Univers



        19:57:00 19:57:00                 DAVID kuo of



                                                                        [facility]







Results







           Test Description Test Time  Test Comments Results    Result     Sourc

e



                                                       Comments   

 

           US TESTICULAR 2021              No evidence of            Univer

sity of



           TORSION    0                     testicular torsion.            [facility]



                      05:38:51              Normal spectral and            Branc

h



                                            color                 



                                            Dopplerfindings at            



                                            both testicles.            



                                            Left testicular            



                                            microlithiasis.            



                                            Simple 8 mm right            



                                            epididymal head            



                                            cyst. Nonspecific            



                                            partially calcified            



                                            4 mm extratesticular            



                                            nodularity adjacent            



                                            tothe right            



                                            testicle, likely            



                                            benign. No            



                                            suspicious            



                                            testicular lesions.            



                                            Preliminary Report            



                                            Dictated by            



                                            Resident: Ian Siu MD., have            



                                            reviewed this study            



                                            and agree with the            



                                            abovereport.US            



                                            TESTICULAR TORSION            



                                            HISTORY: left            



                                            testicular pain Had            



                                            US last week, was            



                                            having right            



                                            testicularpain, pain            



                                            now worse .            



                                            TECHNIQUE:            



                                            Testicular            



                                            ultrasound performed            



                                            with grayscale,            



                                            color, andspectral            



                                            Doppler images            



                                            obtained.             



                                            COMPARISON: 2/3/2021            



                                            FINDINGS:  RIGHT            



                                            TESTICLE: Normal            



                                            size, shape, and            



                                            echotexture without            



                                            a focal lesion.The            



                                            right testicle            



                                            measures 4.0 x 1.8 x            



                                            2.8 cm (10.6 mL).            



                                            LEFT TESTICLE:            



                                            Normal size, shape,            



                                            and echotexture            



                                            without a focal            



                                            lesion.Microlithiasi            



                                            s is redemonstrated.            



                                            The left testicle            



                                            measures 4.3 x 3.1            



                                            x2.7 cm (18.9 mL).            



                                            A lobulated            



                                            echogenic             



                                            extratesticular            



                                            nodularity adjacent            



                                            to the righttestes            



                                            on image #34 with            



                                            posterior acoustic            



                                            shadowing measures            



                                            up to 4               



                                            mm,nonspecific but            



                                            likely benign            



                                            pathology.            



                                            EPIDIDYMIDES:            



                                            Unchanged 8 mm            



                                            anechoic right            



                                            epididymal head            



                                            cyst. BLOOD FLOW:            



                                            Bilateral arterial            



                                            waveforms. The blood            



                                            flow is               



                                            grosslysymmetric.            



                                            SCROTUM: No            



                                            hydrocele.            



                                            VARICOCELE: None.            



                                            [facility]            



                                            Results Inft User -            



                                            2021 11:40 PM            



                                            CSTUS TESTICULAR            



                                            TORSIONHISTORY: left            



                                            testicular pain Had            



                                            US last week, was            



                                            having right            



                                            testicularpain, pain            



                                            now worse             



                                            .TECHNIQUE:            



                                            Testicular            



                                            ultrasound performed            



                                            with grayscale,            



                                            color, andspectral            



                                            Doppler images            



                                            obtained.COMPARISON:            



                                            2/3/2021FINDINGS:            



                                            RIGHT TESTICLE:            



                                            Normal size, shape,            



                                            and echotexture            



                                            without a focal            



                                            lesion.The right            



                                            testicle measures            



                                            4.0 x 1.8 x 2.8 cm            



                                            (10.6 mL). LEFT            



                                            TESTICLE: Normal            



                                            size, shape, and            



                                            echotexture without            



                                            a focal               



                                            lesion.Microlithiasi            



                                            s is redemonstrated.            



                                            The left testicle            



                                            measures 4.3 x 3.1            



                                            x2.7 cm (18.9 mL). A            



                                            lobulated echogenic            



                                            extratesticular            



                                            nodularity adjacent            



                                            to the righttestes            



                                            on image #34 with            



                                            posterior acoustic            



                                            shadowing measures            



                                            up to 4               



                                            mm,nonspecific but            



                                            likely benign            



                                            pathology.            



                                            EPIDIDYMIDES:            



                                            Unchanged 8 mm            



                                            anechoic right            



                                            epididymal head            



                                            cyst.BLOOD FLOW:            



                                            Bilateral arterial            



                                            waveforms. The blood            



                                            flow is               



                                            grosslysymmetric.SCR            



                                            OTUM: No              



                                            hydrocele.VARICOCELE            



                                            : None.IMPRESSIONNo            



                                            evidence of            



                                            testicular torsion.            



                                            Normal spectral and            



                                            color                 



                                            Dopplerfindings at            



                                            both testicles. Left            



                                            testicular            



                                            microlithiasis.Simpl            



                                            e 8 mm right            



                                            epididymal head            



                                            cyst.Nonspecific            



                                            partially calcified            



                                            4 mm extratesticular            



                                            nodularity adjacent            



                                            tothe right            



                                            testicle, likely            



                                            benign. No            



                                            suspicious            



                                            testicular            



                                            lesions.Preliminary            



                                            Report Dictated by            



                                            Resident: Ian Hardy MD., have            



                                            reviewed this study            



                                            and agree with the            



                                            abovereport.            









                    URINALYSIS          2021-02-10 04:59:00 









                      Test Item  Value      Reference Range Interpretation Comme

nts









             APPEARANCE (test code = Hazy         Clear        A            



             [id number])                                         

 

             COLOR (test code = 6547059608) Yellow       Yellow                 

   

 

             PH (test code = 6035244612)              4.8-8.0                   

 

             SP GRAVITY (test code =              1.003-1.030               



             0400067432)                                         

 

             GLU U QUAL (test code = Normal       Normal                    



             5558930229)                                         

 

             BLOOD (test code = 7819863083) Negative     Negative               

   

 

             KETONES (test code = 0321619487) Negative     Negative             

     

 

             PROTEIN (test code = 2887-8) Negative     Negative                 

 

 

             UROBILIN (test code = 9557059995) Normal       Normal              

      

 

             BILIRUBIN (test code = Negative     Negative                  



             0764099320)                                         

 

             NITRITE (test code = 2179745286) Negative     Negative             

     

 

             LEUK ROSS (test code = Negative     Negative                  



             9628588326)                                         

 

             RBC/HPF (test code = 5930407739)              See_Comment          

      [Automated message] The



                                                                 system which ge

nerated this



                                                                 result transmit

[place] reference



                                                                 range: 0 - 3 HP

F. The



                                                                 reference range

 was not used



                                                                 to interpret th

is result as



                                                                 normal/abnormal

.

 

             WBC/HPF (test code = 7216741975)              See_Comment          

      [Automated message] The



                                                                 system which ge

nerated this



                                                                 result transmit

[place] reference



                                                                 range: 0 - 5 HP

F. The



                                                                 reference range

 was not used



                                                                 to interpret th

is result as



                                                                 normal/abnormal

.

 

             BACTERIA (test code = 1971845716) Few          Negative     A      

      

 

             AMORPHOUS (test code = Few          Rare HPF     A            



             7533833516)                                         

 

             Lab Interpretation (test code = Abnormal                           

    



             14622-2)                                            



[facility] 
[facility]





             Test Item    Value        Reference Range Interpretation Comments

 

             SARS-CoV-2 Rapid ID NOW Not Detected Not Detected              



             (test code = 05069-8)                                        

 

             RICKEY (test code = RICKEY) ID NOW COVID-19 Assay                        

   



                          is an isothermal                           



                          nucleic acid                           



                          amplification test                           



                          intended for the                           



                          qualitative detection                           



                          of nucleic acid from                           



                          SARS-CoV-2 viral RNA                           



                          in nasopharyngeal (NP)                           



                          specimens. It is used                           



                          under Emergency Use                           



                          Authorization (EUA) by                           



                          FDA. The limit of                           



                          detection (LOD) of the                           



                          assay is 125 Genome                           



                          Equivalents/mL. A                           



                          positive result is                           



                          indicative of the                           



                          presence of SARS-CoV-2                           



                          RNA. ?Clinical                           



                          correlation with                           



                          patient history and                           



                          other diagnostic                           



                          information is                           



                          necessary to determine                           



                          patient infection                           



                          status. A negative                           



                          (Not Detected) result                           



                          does not preclude                           



                          SARS-CoV-2 infection.                           



                          In patients with                           



                          clinical symptoms and                           



                          other tests that are                           



                          consistent with                           



                          SARS-CoV-2 infection,                           



                          negative results                           



                          should be treated as                           



                          presumptive negative                           



                          and a new specimen                           



                          should be tested with                           



                          alternative PCR                           



                          molecular test.                           



                          Invalid: Please                           



                          collect a new specimen                           



                          for repeat patient                           



                          testing if clinically                           



                          indicated.                             

 

             Lab Interpretation Normal                                 



             (test code = 60070-4)                                        



[facility] Metabolic Panel (NA, K, CL, CO2, 
GLUCOSE, BUN, CREATININE, CA)2021 06:55:00





             Test Item    Value        Reference Range Interpretation Comments

 

             NA (test code = 141 mmol/L   135-145                   



             0054280466)                                         

 

             K (test code = 4.2 mmol/L   3.5-5                     



             4663888116)                                         

 

             CL (test code = 104 mmol/L                       



             1935564695)                                         

 

             CO2 TOTAL (test code = 26 mmol/L    23-31                     



             4493076004)                                         

 

             AGAP (test code =              2-16                      



             0553550827)                                         

 

             BUN (test code = 11 mg/dL     7-23                      



             1377827311)                                         

 

             GLUCOSE (test code = 135 mg/dL           H            



             1529068431)                                         

 

             CREATININE (test code = 0.53 mg/dL   0.6-1.25     L            



             0446640278)                                         

 

             CALCIUM (test code = 9.5 mg/dL    8.6-10.6                  



             1632672284)                                         

 

             eGFR Calculation              mL/min/1.73m2              



             (Non-)                                        



             (test code =                                        



             9945552420)                                         

 

             eGFR Calculation              mL/min/1.73m2              



             (African American)                                        



             (test code =                                        



             2564593929)                                         

 

             RICKEY (test code = RICKEY) Association of                           



                          Glomerular Filtration                           



                          Rate (GFR) and Staging                           



                          of Kidney Disease*                           



                          +---------------------                           



                          --+-------------------                           



                          --+-------------------                           



                          ------+| GFR                           



                          (mL/min/1.73 m2) ?|                           



                          With Kidney Damage ?|                           



                          ?Without Kidney                           



                          Damage+---------------                           



                          --------+-------------                           



                          --------+-------------                           



                          ------------+| ?>90 ?                           



                          ? ? ? ? ? ? ? ?|                           



                          ?Stage one ? ? ? ? ?|                           



                          ? Normal ? ? ? ? ? ? ?                           



                          ?+--------------------                           



                          ---+------------------                           



                          ---+------------------                           



                          -------+| ?60-89 ? ? ?                           



                          ? ? ? ? ?| ?Stage two                           



                          ? ? ? ? ?| ? Decreased                           



                          GFR ? ? ? ?                            



                          +---------------------                           



                          --+-------------------                           



                          --+-------------------                           



                          ------+| ?30-59 ? ? ?                           



                          ? ? ? ? ?| ?Stage                           



                          three ? ? ? ?| ? Stage                           



                          three ? ? ? ? ?                           



                          +---------------------                           



                          --+-------------------                           



                          --+-------------------                           



                          ------+| ?15-29 ? ? ?                           



                          ? ? ? ? ?| ?Stage four                           



                          ? ? ? ? | ? Stage four                           



                          ? ? ? ? ?                              



                          ?+--------------------                           



                          ---+------------------                           



                          ---+------------------                           



                          -------+| ?<15 (or                           



                          dialysis) ? ?| ?Stage                           



                          five ? ? ? ? | ? Stage                           



                          five ? ? ? ? ?                           



                          ?+--------------------                           



                          ---+------------------                           



                          ---+------------------                           



                          -------+ *Each stage                           



                          assumes the associated                           



                          GFR level has been in                           



                          effect for at least                           



                          three months. ?Stages                           



                          1 to 5, with or                           



                          without kidney                           



                          disease, indicate                           



                          chronic kidney                           



                          disease. Notes:                           



                          Determination of                           



                          stages one and two                           



                          (with eGFR                             



                          >59mL/min/1.73 m2)                           



                          requires estimation of                           



                          kidney damage for at                           



                          least three months as                           



                          defined by structural                           



                          or functional                           



                          abnormalities of the                           



                          kidney, manifested by                           



                          either:Pathological                           



                          abnormalities or                           



                          Markers of kidney                           



                          damage (including                           



                          abnormalities in the                           



                          composition of the                           



                          blood or urine or                           



                          abnormalities in                           



                          imaging tests).                           

 

             Lab Interpretation Abnormal                               



             (test code = 66532-9)                                        



[facility] (ALB, T.PRO, BILI T, 
BU/BC, ALT, AST, ALK PHOS)2021 06:55:00





             Test Item    Value        Reference Range Interpretation Comments

 

             TOTAL BILI (test code = 1508925278) 0.5 mg/dL    0.1-1.1           

        

 

             BILI UNCON (test code = 0920225747) 0.2 mg/dL    0.1-1.1           

        

 

             BILI CONJ (test code = 1429485952) 0.0 mg/dL    0-0.3              

       

 

             T PROTEIN (test code = 2708198627) 8.2 g/dL     6.3-8.2            

       

 

             ALBUMIN (test code = 1463707609) 4.8 g/dL     3.5-5                

     

 

             ALK PHOS (test code = 3956279730) 135 U/L             H      

      

 

             ALTv (test code = 1742-6) 51 U/L       5-50         H            

 

             AST(SGOT) (test code = 8903851274) 40 U/L       13-40              

       

 

             Lab Interpretation (test code = Abnormal                           

    



             54212-2)                                            



[facility]





             Test Item    Value        Reference Range Interpretation Comments

 

             APTT Patient (test              See_Comment                [Automat

ed



             code = 3173-2)                                        message] The



                                                                 system which



                                                                 generated this



                                                                 result



                                                                 transmitted



                                                                 reference range

:



                                                                 23 - 38 Seconds

.



                                                                 The reference



                                                                 range was not



                                                                 used to interpr

et



                                                                 this result as



                                                                 normal/abnormal

.

 

             RICKEY (test code = RICKEY) [facility] patient                           



                          population mean                           



                          normal value for                           



                          aPTT is 30                             



                          seconds.                               

 

             Lab Interpretation Normal                                 



             (test code = 43858-4)                                        



[facility] / [id number] 06:51:00





             Test Item    Value        Reference Range Interpretation Comments

 

             PROTIME PATIENT (test              See_Comment                [Auto

mated message]



             code = 5964-2)                                        The system wh

ich



                                                                 generated this 

result



                                                                 transmitted ref

erence



                                                                 range: 12.0 - 1

4.7



                                                                 Seconds. The re

ference



                                                                 range was not u

sed to



                                                                 interpret this 

result



                                                                 as normal/abnor

mal.

 

             INR (test code = 6301-6)                                        Nor

mal INR <1.1;



                                                                 Warfarin Therap

eutic



                                                                 range 2.0 to 3.

0 or



                                                                 2.5 to 3.5, dep

ending



                                                                 upon the indica

tions.

 

             Lab Interpretation (test Normal                                 



             code = 29299-3)                                        



[facility] [id number] 06:44:00





             Test Item    Value        Reference Range Interpretation Comments

 

             WBC (test code =              See_Comment                [Automated



             3990-2)                                             message] The sy

stem



                                                                 which generated



                                                                 this result



                                                                 transmitted



                                                                 reference range

:



                                                                 4.20 - 10.70



                                                                 10*3/?L. The



                                                                 reference range

 was



                                                                 not used to



                                                                 interpret this



                                                                 result as



                                                                 normal/abnormal

.

 

             RBC (test code =              See_Comment                [Automated



             239-8)                                              message] The sy

stem



                                                                 which generated



                                                                 this result



                                                                 transmitted



                                                                 reference range

:



                                                                 4.26 - 5.52



                                                                 10*6/?L. The



                                                                 reference range

 was



                                                                 not used to



                                                                 interpret this



                                                                 result as



                                                                 normal/abnormal

.

 

             HGB (test code = 15.0 g/dL    12.2-16.4                 



             718-7)                                              

 

             HCT (test code = 44.6 %       38.4-49.3                 



             4544-3)                                             

 

             MCV (test code = 85.0 fL      81.7-95.6                 



             787-2)                                              

 

             MCH (test code = 28.6 pg      26.1-32.7                 



             785-6)                                              

 

             MCHC (test code = 33.6 g/dL    31.2-35                   



             786-4)                                              

 

             RDW-SD (test code = 37.9 fL      38.5-51.6    L            



             12566-6)                                            

 

             RDW-CV (test code = 12.3 %       12.1-15.4                 



             788-0)                                              

 

             PLT (test code =              See_Comment  H             [Automated



             777-3)                                              message] The sy

stem



                                                                 which generated



                                                                 this result



                                                                 transmitted



                                                                 reference range

:



                                                                 150 - 328 10*3/

?L.



                                                                 The reference r

horacio



                                                                 was not used to



                                                                 interpret this



                                                                 result as



                                                                 normal/abnormal

.

 

             MPV (test code = 8.9 fL       9.8-13       L            



             55386-0)                                            

 

             NRBC/100 WBC (test              See_Comment                [Automat

ed



             code = 7270815636)                                        message] 

The system



                                                                 which generated



                                                                 this result



                                                                 transmitted



                                                                 reference range

:



                                                                 0.0 - 10.0 /100



                                                                 WBCs. The refer

ence



                                                                 range was not u

sed



                                                                 to interpret th

is



                                                                 result as



                                                                 normal/abnormal

.

 

             NRBC x10^3 (test code <0.01        See_Comment                [Auto

mated



             = 2889866087)                                        message] The s

ystem



                                                                 which generated



                                                                 this result



                                                                 transmitted



                                                                 reference range

:



                                                                 10*3/?L. The



                                                                 reference range

 was



                                                                 not used to



                                                                 interpret this



                                                                 result as



                                                                 normal/abnormal

.

 

             GRAN MAT (NEUT) % 53.1 %                                 



             (test code = 770-8)                                        

 

             IMM GRAN % (test code 0.50 %                                 



             = 7935075010)                                        

 

             LYMPH % (test code = 36.1 %                                 



             736-9)                                              

 

             MONO % (test code = 7.6 %                                  



             5905-5)                                             

 

             EOS % (test code = 2.3 %                                  



             713-8)                                              

 

             BASO % (test code = 0.4 %                                  



             706-2)                                              

 

             GRAN MAT x10^3(ANC) 4.14 10*3/uL 1.99-6.95                 



             (test code =                                        



             8002677777)                                         

 

             IMM GRAN x10^3 (test 0.04 10*3/uL 0-0.06                    



             code = 6747968791)                                        

 

             LYMPH x10^3 (test code 2.81 10*3/uL 1.09-3.23                 



             = 731-0)                                            

 

             MONO x10^3 (test code 0.59 10*3/uL 0.36-1.02                 



             = 742-7)                                            

 

             EOS x10^3 (test code = 0.18 10*3/uL 0.06-0.53                 



             711-2)                                              

 

             BASO x10^3 (test code 0.03 10*3/uL 0.01-0.09                 



             = 704-7)                                            

 

             Lab Interpretation Abnormal                               



             (test code = 90203-5)                                        



[facility]





             Test Item    Value        Reference Range Interpretation Comments

 

             APPEARANCE (test code = Hazy         Clear        A            



             4881128783)                                         

 

             COLOR (test code = Yellow       Yellow                    



             0917672881)                                         

 

             PH (test code =              4.8-8.0                   



             3382167454)                                         

 

             SP GRAVITY (test code =              1.003-1.030               



             9915450598)                                         

 

             GLU U QUAL (test code = Normal       Normal                    



             7905843216)                                         

 

             BLOOD (test code = Negative     Negative                  



             5415301047)                                         

 

             KETONES (test code = Negative     Negative                  



             2467663388)                                         

 

             PROTEIN (test code = Negative     Negative                  



             2887-8)                                             

 

             UROBILIN (test code = 2.0 mg/dL    Normal       A            



             5517376624)                                         

 

             BILIRUBIN (test code = Negative     Negative                  



             4208939304)                                         

 

             NITRITE (test code = Negative     Negative                  



             5658621398)                                         

 

             LEUK ROSS (test code = Negative     Negative                  



             5145252615)                                         

 

             RBC/HPF (test code =              See_Comment                [Autom

ated message]



             1429670837)                                         The system [employer]



                                                                 generated this



                                                                 result transmit

[place]



                                                                 reference range

: 0 -



                                                                 3 HPF. The refe

rence



                                                                 range was not u

sed



                                                                 to interpret th

is



                                                                 result as



                                                                 normal/abnormal

.

 

             WBC/HPF (test code =              See_Comment                [Autom

ated message]



             7017644047)                                         The system [employer]



                                                                 generated this



                                                                 result transmit

[place]



                                                                 reference range

: 0 -



                                                                 5 HPF. The refe

rence



                                                                 range was not u

sed



                                                                 to interpret th

is



                                                                 result as



                                                                 normal/abnormal

.

 

             BACTERIA (test code = Negative     Negative                  



             0050066196)                                         

 

             MUCOUS (test code = Slight       Negative LPF A            



             8444063083)                                         

 

             AMORPHOUS (test code = Rare         Rare HPF                  



             8237355769)                                         

 

             Lab Interpretation (test Abnormal                               



             code = 11717-1)                                        



[facility] Metabolic Panel (NA, K, CL, CO2, 
GLUCOSE, BUN, CREATININE, CA)2020 12:37:00





             Test Item    Value        Reference Range Interpretation Comments

 

             NA (test code = 136 mmol/L   135-145                   



             7387659040)                                         

 

             K (test code = 4.0 mmol/L   3.5-5                     



             7330773480)                                         

 

             CL (test code = 106 mmol/L                       



             0918081984)                                         

 

             CO2 TOTAL (test code = 23 mmol/L    23-31                     



             9226826261)                                         

 

             AGAP (test code =              2-16                      



             8938576822)                                         

 

             BUN (test code = 10 mg/dL     7-23                      



             9367604144)                                         

 

             GLUCOSE (test code = 122 mg/dL           H            



             0652377265)                                         

 

             CREATININE (test code = 0.61 mg/dL   0.6-1.25                  



             4097787035)                                         

 

             CALCIUM (test code = 9.2 mg/dL    8.6-10.6                  



             7115663344)                                         

 

             eGFR Calculation              mL/min/1.73m2              



             (Non-)                                        



             (test code =                                        



             9730957380)                                         

 

             eGFR Calculation              mL/min/1.73m2              



             (African American)                                        



             (test code =                                        



             5203993510)                                         

 

             RICKEY (test code = RICKEY) Association of                           



                          Glomerular Filtration                           



                          Rate (GFR) and Staging                           



                          of Kidney Disease*                           



                          +---------------------                           



                          --+-------------------                           



                          --+-------------------                           



                          ------+| GFR                           



                          (mL/min/1.73 m2) ?|                           



                          With Kidney Damage ?|                           



                          ?Without Kidney                           



                          Damage+---------------                           



                          --------+-------------                           



                          --------+-------------                           



                          ------------+| ?>90 ?                           



                          ? ? ? ? ? ? ? ?|                           



                          ?Stage one ? ? ? ? ?|                           



                          ? Normal ? ? ? ? ? ? ?                           



                          ?+--------------------                           



                          ---+------------------                           



                          ---+------------------                           



                          -------+| ?60-89 ? ? ?                           



                          ? ? ? ? ?| ?Stage two                           



                          ? ? ? ? ?| ? Decreased                           



                          GFR ? ? ? ?                            



                          +---------------------                           



                          --+-------------------                           



                          --+-------------------                           



                          ------+| ?30-59 ? ? ?                           



                          ? ? ? ? ?| ?Stage                           



                          three ? ? ? ?| ? Stage                           



                          three ? ? ? ? ?                           



                          +---------------------                           



                          --+-------------------                           



                          --+-------------------                           



                          ------+| ?15-29 ? ? ?                           



                          ? ? ? ? ?| ?Stage four                           



                          ? ? ? ? | ? Stage four                           



                          ? ? ? ? ?                              



                          ?+--------------------                           



                          ---+------------------                           



                          ---+------------------                           



                          -------+| ?<15 (or                           



                          dialysis) ? ?| ?Stage                           



                          five ? ? ? ? | ? Stage                           



                          five ? ? ? ? ?                           



                          ?+--------------------                           



                          ---+------------------                           



                          ---+------------------                           



                          -------+ *Each stage                           



                          assumes the associated                           



                          GFR level has been in                           



                          effect for at least                           



                          three months. ?Stages                           



                          1 to 5, with or                           



                          without kidney                           



                          disease, indicate                           



                          chronic kidney                           



                          disease. Notes:                           



                          Determination of                           



                          stages one and two                           



                          (with eGFR                             



                          >59mL/min/1.73 m2)                           



                          requires estimation of                           



                          kidney damage for at                           



                          least three months as                           



                          defined by structural                           



                          or functional                           



                          abnormalities of the                           



                          kidney, manifested by                           



                          either:Pathological                           



                          abnormalities or                           



                          Markers of kidney                           



                          damage (including                           



                          abnormalities in the                           



                          composition of the                           



                          blood or urine or                           



                          abnormalities in                           



                          imaging tests).                           

 

             Lab Interpretation Abnormal                               



             (test code = 22923-8)                                        



[facility] with [id number] 12:27:00





             Test Item    Value        Reference Range Interpretation Comments

 

             WBC (test code =              See_Comment                [Automated



             0390-2)                                             message] The



                                                                 system which



                                                                 generated this



                                                                 result transmit

[place]



                                                                 reference range

:



                                                                 4.50 - 13.50



                                                                 10*3/?L. The



                                                                 reference range



                                                                 was not used to



                                                                 interpret this



                                                                 result as



                                                                 normal/abnormal

.

 

             RBC (test code =              See_Comment                [Automated



             919-8)                                              message] The



                                                                 system which



                                                                 generated this



                                                                 result transmit

[place]



                                                                 reference range

:



                                                                 4.50 - 5.30



                                                                 10*6/?L. The



                                                                 reference range



                                                                 was not used to



                                                                 interpret this



                                                                 result as



                                                                 normal/abnormal

.

 

             HGB (test code = 14.2 g/dL    13-16                     



             718-7)                                              

 

             HCT (test code = 42.1 %       37-49                     



             4544-3)                                             

 

             MCV (test code = 85.9 fL      78-95                     



             787-2)                                              

 

             MCH (test code = 29.0 pg      26-32                     



             785-6)                                              

 

             MCHC (test code = 33.7 g/dL    32-36                     



             786-4)                                              

 

             RDW-SD (test code = 38.9 fL      38.5-49                   



             36661-9)                                            

 

             RDW-CV (test code = 12.4 %       11.5-14                   



             788-0)                                              

 

             PLT (test code =              See_Comment                [Automated



             777-3)                                              message] The



                                                                 system which



                                                                 generated this



                                                                 result transmit

[place]



                                                                 reference range

:



                                                                 133 - 320 10*3/

?L.



                                                                 The reference



                                                                 range was not u

sed



                                                                 to interpret th

is



                                                                 result as



                                                                 normal/abnormal

.

 

             MPV (test code = 9.6 fL       9.3-12.9                  



             54732-2)                                            

 

             NRBC/100 WBC (test              See_Comment                [Automat

ed



             code = 2246292352)                                        message] 

The



                                                                 system which



                                                                 generated this



                                                                 result transmit

[place]



                                                                 reference range

:



                                                                 0.0 - 10.0 /100



                                                                 WBCs. The



                                                                 reference range



                                                                 was not used to



                                                                 interpret this



                                                                 result as



                                                                 normal/abnormal

.

 

             NRBC x10^3 (test code <0.01        See_Comment                [Auto

mated



             = [id number])                                        message] The



                                                                 system which



                                                                 generated this



                                                                 result transmit

[place]



                                                                 reference range

:



                                                                 10*3/?L. The



                                                                 reference range



                                                                 was not used to



                                                                 interpret this



                                                                 result as



                                                                 normal/abnormal

.

 

             GRAN MAT (NEUT) % 81.7 %                                 



             (test code = 770-8)                                        

 

             IMM GRAN % (test code 0.50 %                                 



             = [id number])                                        

 

             LYMPH % (test code = 11.6 %                                 



             736-9)                                              

 

             MONO % (test code = 6.0 %                                  



             5905-5)                                             

 

             EOS % (test code = 0.0 %                                  



             713-8)                                              

 

             BASO % (test code = 0.2 %                                  



             706-2)                                              

 

             GRAN MAT x10^3(ANC) 10.61 10*3/uL 1.5-10.3     H            



             (test code =                                        



             9996502025)                                         

 

             IMM GRAN x10^3 (test 0.06 10*3/uL 0-0.06                    



             code = 2823305415)                                        

 

             LYMPH x10^3 (test code 1.51 10*3/uL 0.7-7.4                   



             = 731-0)                                            

 

             MONO x10^3 (test code 0.78 10*3/uL 0-0.5        H            



             = 742-7)                                            

 

             EOS x10^3 (test code = <0.03        0-0.4                     



             711-2)                                              

 

             BASO x10^3 (test code <0.03        0-0.1                     



             = 704-7)                                            

 

             Lab Interpretation Abnormal                               



             (test code = 39141-8)                                        



[id number] 
04:43:28 1. ?Fluid-filled, thickened and mildly dilated appendix with 
minimaladjacent fat stranding, consistent with early acute appendicitis. 
Noevidence of perforation or organized collection identified.  Findings were 
conveyed to Dr. Stubbs at 10:05 PM on 2020. Preliminary Report Dictated by 
Resident: Cinthia Lincoln MD., have reviewed this study and 
agree with the abovereport. EXAM: CT ABDOMEN AND PELVIS WITH CONTRAST HISTORY: 
19-year-old male complaints of right lower quadrant pain.COMPARISON: None. 
TECHNIQUE AND FINDINGS: Contiguous axial imaging from the level of the lungbases
through the pubic symphysis was performed after the uncomplicatedadministration 
of 120 cc of intravenous Omnipaque contrast. Coronal andsagittal reconstructions
were obtained. ?Auto mA and/or iterativereconstruction were used to reduce 
radiation dose. FINDINGS: LOWER THORAX: The lungs bases are clear. No 
cardiomegaly. LIVER: No focal hepatic lesions. ?Normal contour. GALLBLADDER AND 
BILIARY TREE: No biliary ductal dilation. The gallbladderis physiologically 
distended. No gallbladder wall thickening. SPLEEN: No splenomegaly. PANCREAS: No
ductal dilation or masses. ADRENAL GLANDS: No adrenal nodules. KIDNEYS: No 
hydronephrosis, stones or masses are identified. PERITONEUM AND RETROPERITONEUM:
No free air or fluid. LYMPH NODES: Scattered bilateral inguinal lymph nodes, 
measuring up to 1cm, possibly reactive. GI TRACT: The appendix is dilated up to 
0.8 cm and is fluid-filled withthickened enhancing wall and minimal adjacent fat
stranding. Noperiappendiceal free fluid, air or organized when compared No
boweldilation or wall thickening. PELVIS/BLADDER: The bladder is decompressed. 
VESSELS: Unremarkable. BONES AND SOFT TISSUES: No suspicious lytic or sclerotic 
bony lesions. [facility], Radiant Results Inft User - 2020 11:44 PM CDTEXAM: CT 
ABDOMEN AND PELVIS WITH CONTRASTHISTORY: 19-year-old male complaints of right 
lower quadrant pain.COMPARISON: None.TECHNIQUE AND FINDINGS: Contiguous axial 
imaging from the level of the lungbases through the pubic symphysis was 
performed after the uncomplicatedadministration of 120 cc of intravenous 
Omnipaque contrast. Coronal andsagittal reconstructions were obtained.  Auto mA 
and/or iterativereconstruction were used to reduce radiation dose.FINDINGS:LOWER
THORAX: The lungs bases are clear. No cardiomegaly.LIVER: No focal hepatic 
lesions.  Normal contour.GALLBLADDER AND BILIARY TREE: No biliary ductal 
dilation. The gallbladderis physiologically distended. No gallbladder wall 
thickening.SPLEEN: No splenomegaly.PANCREAS: No ductal dilation or 
masses.ADRENAL GLANDS: No adrenal nodules.KIDNEYS: No hydronephrosis, stones or 
masses are identified.PERITONEUM AND RETROPERITONEUM: No free air or fluid.LYMPH
NODES: Scattered bilateral inguinal lymph nodes, measuring up to 1cm, possibly 
reactive.GI TRACT: The appendix is dilated up to 0.8 cm and is fluid-filled 
withthickened enhancing wall and minimal adjacent fat stranding. 
Noperiappendiceal free fluid, air or organized when compared No boweldilation or
wall thickening.PELVIS/BLADDER: The bladder is decompressed.VESSELS: 
Unremarkable.BONES AND SOFT TISSUES: No suspicious lytic or sclerotic bony 
lesions.IMPRESSION1.  Fluid-filled, thickened and mildly dilated appendix with 
minimaladjacent fat stranding, consistent with early acute appendicitis. 
Noevidence of perforation or organized collection identified.Findings were 
conveyed to Dr. Stubbs at 10:05 PM on 2020.Preliminary Report Dictated by 
Resident: Alexis Null, Cinthia Fisher MD., have reviewed this study and 
agree with the abovereport.[facility]2020 03:32:00





             Test Item    Value        Reference Range Interpretation Comments

 

             SARS-CoV-2 Rapid ID NOW Not Detected Not Detected              



             (test code = 31429-0)                                        

 

             RICKEY (test code = RICKEY) ID NOW COVID-19 Assay                        

   



                          is an isothermal                           



                          nucleic acid                           



                          amplification test                           



                          intended for the                           



                          qualitative detection                           



                          of nucleic acid from                           



                          SARS-CoV-2 viral RNA                           



                          in nasopharyngeal (NP)                           



                          specimens. It is used                           



                          under Emergency Use                           



                          Authorization (EUA) by                           



                          FDA. The limit of                           



                          detection (LOD) of the                           



                          assay is 125 Genome                           



                          Equivalents/mL. A                           



                          positive result is                           



                          indicative of the                           



                          presence of SARS-CoV-2                           



                          RNA. ?Clinical                           



                          correlation with                           



                          patient history and                           



                          other diagnostic                           



                          information is                           



                          necessary to determine                           



                          patient infection                           



                          status. A negative                           



                          (Not Detected) result                           



                          does not preclude                           



                          SARS-CoV-2 infection.                           



                          In patients with                           



                          clinical symptoms and                           



                          other tests that are                           



                          consistent with                           



                          SARS-CoV-2 infection,                           



                          negative results                           



                          should be treated as                           



                          presumptive negative                           



                          and a new specimen                           



                          should be tested with                           



                          alternative PCR                           



                          molecular test.                           



                          Invalid: Please                           



                          collect a new specimen                           



                          for repeat patient                           



                          testing if clinically                           



                          indicated.                             

 

             Lab Interpretation Normal                                 



             (test code = 15635-0)                                        



[facility]





             Test Item    Value        Reference Range Interpretation Comments

 

             LIPASE (test code = 8565333181) 15 U/L       0-220                 

    

 

             Lab Interpretation (test code = Normal                             

    



             44214-8)                                            



[facility] (ALB, T.PRO, BILI T, 
BU/BC, ALT, AST, ALK PHOS)2020 02:34:00





             Test Item    Value        Reference Range Interpretation Comments

 

             TOTAL BILI (test code = 0686059801) 0.7 mg/dL    0.1-1.1           

        

 

             BILI UNCON (test code = 1096902702) 0.8 mg/dL    0.1-1.1           

        

 

             BILI CONJ (test code = 4824068947) 0.0 mg/dL    0-0.3              

       

 

             T PROTEIN (test code = 8407366625) 8.8 g/dL     6.3-8.2      H     

       

 

             ALBUMIN (test code = 1642317540) 5.2 g/dL     3.5-5        H       

     

 

             ALK PHOS (test code = 7922119807) 91 U/L                     

      

 

             ALTv (test code = 1742-6) 36 U/L       5-50                      

 

             AST(SGOT) (test code = 1378756476) 30 U/L       13-40              

       

 

             Lab Interpretation (test code = Abnormal                           

    



             13434-4)                                            



[facility]28[facility]





             Test Item    Value        Reference Range Interpretation Comments

 

             APPEARANCE (test code = Clear        Clear                     



             5313745432)                                         

 

             COLOR (test code = Yellow       Yellow                    



             7835115063)                                         

 

             PH (test code =              4.8-8.0                   



             3079961370)                                         

 

             SP GRAVITY (test code =              1.003-1.030  H            



             4467097853)                                         

 

             GLU U QUAL (test code = Normal       Normal                    



             7927917997)                                         

 

             BLOOD (test code = Negative     Negative                  



             4236303439)                                         

 

             KETONES (test code = 80 mg/dL     Negative     A            



             9154094425)                                         

 

             PROTEIN (test code = 30 mg/dL     Negative     A            



             2887-8)                                             

 

             UROBILIN (test code = Normal       Normal                    



             2471563756)                                         

 

             BILIRUBIN (test code = Negative     Negative                  



             0138771438)                                         

 

             NITRITE (test code = Negative     Negative                  



             3545217153)                                         

 

             LEUK ROSS (test code = Negative     Negative                  



             1885097126)                                         

 

             RBC/HPF (test code =              See_Comment                [Autom

ated message]



             7388763086)                                         The system [employer]



                                                                 generated this 

result



                                                                 transmitted ref

erence



                                                                 range: 0 - 3 HP

F. The



                                                                 reference range

 was



                                                                 not used to int

erpret



                                                                 this result as



                                                                 normal/abnormal

.

 

             WBC/HPF (test code = <1           See_Comment                [Autom

ated message]



             7489699175)                                         The system [employer]



                                                                 generated this 

result



                                                                 transmitted ref

erence



                                                                 range: 0 - 5 HP

F. The



                                                                 reference range

 was



                                                                 not used to int

erpret



                                                                 this result as



                                                                 normal/abnormal

.

 

             BACTERIA (test code = Negative     Negative                  



             6526572472)                                         

 

             MUCOUS (test code = Moderate     Negative LPF A            



             0384612210)                                         

 

             HYAL CAST (test code =              See_Comment                [Aut

omated message]



             8944503282)                                         The system [employer]



                                                                 generated this 

result



                                                                 transmitted ref

erence



                                                                 range: <=2 LPF.

 The



                                                                 reference range

 was



                                                                 not used to int

erpret



                                                                 this result as



                                                                 normal/abnormal

.

 

             Lab Interpretation (test Abnormal                               



             code = 81130-6)                                        



[facility] Metabolic Panel (NA, K, CL, CO2, 
GLUCOSE, BUN, CREATININE, CA)2020 02:17:00





             Test Item    Value        Reference Range Interpretation Comments

 

             NA (test code = 140 mmol/L   135-145                   



             7824909194)                                         

 

             K (test code = 3.9 mmol/L   3.5-5                     



             4646837650)                                         

 

             CL (test code = 102 mmol/L                       



             4656644916)                                         

 

             CO2 TOTAL (test code = 24 mmol/L    23-31                     



             6188625551)                                         

 

             AGAP (test code =              2-16                      



             4009064633)                                         

 

             BUN (test code = 13 mg/dL     7-23                      



             8945579851)                                         

 

             GLUCOSE (test code = 116 mg/dL           H            



             6207940619)                                         

 

             CREATININE (test code = 0.68 mg/dL   0.6-1.25                  



             8665687678)                                         

 

             CALCIUM (test code = 9.9 mg/dL    8.6-10.6                  



             2402532459)                                         

 

             eGFR Calculation              mL/min/1.73m2              



             (Non-)                                        



             (test code =                                        



             2412539236)                                         

 

             eGFR Calculation              mL/min/1.73m2              



             (African American)                                        



             (test code =                                        



             6478349647)                                         

 

             RICKEY (test code = RICKEY) Association of                           



                          Glomerular Filtration                           



                          Rate (GFR) and Staging                           



                          of Kidney Disease*                           



                          +---------------------                           



                          --+-------------------                           



                          --+-------------------                           



                          ------+| GFR                           



                          (mL/min/1.73 m2) ?|                           



                          With Kidney Damage ?|                           



                          ?Without Kidney                           



                          Damage+---------------                           



                          --------+-------------                           



                          --------+-------------                           



                          ------------+| ?>90 ?                           



                          ? ? ? ? ? ? ? ?|                           



                          ?Stage one ? ? ? ? ?|                           



                          ? Normal ? ? ? ? ? ? ?                           



                          ?+--------------------                           



                          ---+------------------                           



                          ---+------------------                           



                          -------+| ?60-89 ? ? ?                           



                          ? ? ? ? ?| ?Stage two                           



                          ? ? ? ? ?| ? Decreased                           



                          GFR ? ? ? ?                            



                          +---------------------                           



                          --+-------------------                           



                          --+-------------------                           



                          ------+| ?30-59 ? ? ?                           



                          ? ? ? ? ?| ?Stage                           



                          three ? ? ? ?| ? Stage                           



                          three ? ? ? ? ?                           



                          +---------------------                           



                          --+-------------------                           



                          --+-------------------                           



                          ------+| ?15-29 ? ? ?                           



                          ? ? ? ? ?| ?Stage four                           



                          ? ? ? ? | ? Stage four                           



                          ? ? ? ? ?                              



                          ?+--------------------                           



                          ---+------------------                           



                          ---+------------------                           



                          -------+| ?<15 (or                           



                          dialysis) ? ?| ?Stage                           



                          five ? ? ? ? | ? Stage                           



                          five ? ? ? ? ?                           



                          ?+--------------------                           



                          ---+------------------                           



                          ---+------------------                           



                          -------+ *Each stage                           



                          assumes the associated                           



                          GFR level has been in                           



                          effect for at least                           



                          three months. ?Stages                           



                          1 to 5, with or                           



                          without kidney                           



                          disease, indicate                           



                          chronic kidney                           



                          disease. Notes:                           



                          Determination of                           



                          stages one and two                           



                          (with eGFR                             



                          >59mL/min/1.73 m2)                           



                          requires estimation of                           



                          kidney damage for at                           



                          least three months as                           



                          defined by structural                           



                          or functional                           



                          abnormalities of the                           



                          kidney, manifested by                           



                          either:Pathological                           



                          abnormalities or                           



                          Markers of kidney                           



                          damage (including                           



                          abnormalities in the                           



                          composition of the                           



                          blood or urine or                           



                          abnormalities in                           



                          imaging tests).                           

 

             Lab Interpretation Abnormal                               



             (test code = 52769-5)                                        



[facility] with [id number] 01:41:00





             Test Item    Value        Reference Range Interpretation Comments

 

             WBC (test code =              See_Comment  H             [Automated



             3743-2)                                             message] The



                                                                 system which



                                                                 generated this



                                                                 result transmit

[place]



                                                                 reference range

:



                                                                 4.50 - 13.50



                                                                 10*3/?L. The



                                                                 reference range



                                                                 was not used to



                                                                 interpret this



                                                                 result as



                                                                 normal/abnormal

.

 

             RBC (test code =              See_Comment                [Automated



             724-8)                                              message] The



                                                                 system which



                                                                 generated this



                                                                 result transmit

[place]



                                                                 reference range

:



                                                                 4.50 - 5.30



                                                                 10*6/?L. The



                                                                 reference range



                                                                 was not used to



                                                                 interpret this



                                                                 result as



                                                                 normal/abnormal

.

 

             HGB (test code = 15.4 g/dL    13-16                     



             718-7)                                              

 

             HCT (test code = 44.8 %       37-49                     



             4544-3)                                             

 

             MCV (test code = 84.5 fL      78-95                     



             787-2)                                              

 

             MCH (test code = 29.1 pg      26-32                     



             785-6)                                              

 

             MCHC (test code = 34.4 g/dL    32-36                     



             786-4)                                              

 

             RDW-SD (test code = 37.9 fL      38.5-49      L            



             87582-7)                                            

 

             RDW-CV (test code = 12.4 %       11.5-14                   



             788-0)                                              

 

             PLT (test code =              See_Comment  H             [Automated



             076-3)                                              message] The



                                                                 system which



                                                                 generated this



                                                                 result transmit

[place]



                                                                 reference range

:



                                                                 133 - 320 10*3/

?L.



                                                                 The reference



                                                                 range was not u

sed



                                                                 to interpret th

is



                                                                 result as



                                                                 normal/abnormal

.

 

             MPV (test code = 9.3 fL       9.3-12.9                  



             95057-1)                                            

 

             NRBC/100 WBC (test              See_Comment                [Automat

ed



             code = 1753979330)                                        message] 

The



                                                                 system which



                                                                 generated this



                                                                 result transmit

[place]



                                                                 reference range

:



                                                                 0.0 - 10.0 /100



                                                                 WBCs. The



                                                                 reference range



                                                                 was not used to



                                                                 interpret this



                                                                 result as



                                                                 normal/abnormal

.

 

             NRBC x10^3 (test code <0.01        See_Comment                [Auto

mated



             = [id number])                                        message] The



                                                                 system which



                                                                 generated this



                                                                 result transmit

[place]



                                                                 reference range

:



                                                                 10*3/?L. The



                                                                 reference range



                                                                 was not used to



                                                                 interpret this



                                                                 result as



                                                                 normal/abnormal

.

 

             GRAN MAT (NEUT) % 85.5 %                                 



             (test code = 770-8)                                        

 

             IMM GRAN % (test code 0.50 %                                 



             = [id number])                                        

 

             LYMPH % (test code = 7.9 %                                  



             736-9)                                              

 

             MONO % (test code = 5.8 %                                  



             5905-5)                                             

 

             EOS % (test code = 0.1 %                                  



             713-8)                                              

 

             BASO % (test code = 0.2 %                                  



             706-2)                                              

 

             GRAN MAT x10^3(ANC) 12.93 10*3/uL 1.5-10.3     H            



             (test code =                                        



             1872794826)                                         

 

             IMM GRAN x10^3 (test 0.08 10*3/uL 0-0.06       H            



             code = 5169910002)                                        

 

             LYMPH x10^3 (test code 1.19 10*3/uL 0.7-7.4                   



             = 731-0)                                            

 

             MONO x10^3 (test code 0.87 10*3/uL 0-0.5        H            



             = 742-7)                                            

 

             EOS x10^3 (test code = <0.03        0-0.4                     



             711-2)                                              

 

             BASO x10^3 (test code 0.03 10*3/uL 0-0.1                     



             = 704-7)                                            

 

             Lab Interpretation Abnormal                               



             (test code = 83147-5)                                        



[facility]

## 2022-06-21 NOTE — ER
Nurse's Notes                                                                                     

 [facility]                                                                 

Name: Reynold Mcmillan                                                                          

Age: 21 yrs                                                                                       

Sex: Male                                                                                         

: 2000                                                                                   

MRN: [id number]                                                                                   

Arrival Date: 2022                                                                          

Time: 10:56                                                                                       

Account#: [id number]                                                                            

Bed 11                                                                                            

Private MD:                                                                                       

Diagnosis: History of COVID-19                                                                    

                                                                                                  

Presentation:                                                                                     

                                                                                             

11:04 Chief complaint: Patient states: tested Covid positive on 22 has been coughing x  vg1 

      5 days and needs to be tested for Covid in order to return to work. Coronavirus screen:     

      Vaccine status: Patient reports being unvaccinated. Client denies travel out of the         

      [place] in the last 14 days. Ebola Screen: Patient denies exposure to infectious person.       

      Initial Sepsis Screen: Does the patient meet any 2 criteria? No. Patient's initial          

      sepsis screen is negative. Does the patient have a suspected source of infection? No.       

      Patient's initial sepsis screen is negative. Risk Assessment: Do you want to hurt           

      yourself or someone else? Patient reports no desire to harm self or others. Onset of        

      symptoms was 2022.                                                                 

11:04 Method Of Arrival: Ambulatory                                                           vg1 

11:04 Acuity: ROSIE 4                                                                           vg1 

                                                                                                  

Triage Assessment:                                                                                

11:05 General: Appears comfortable, Behavior is calm, cooperative. Pain: Denies pain.         vg1 

      Respiratory: Reports cough that is Airway is patent Respiratory effort is even,             

      unlabored.                                                                                  

                                                                                                  

Historical:                                                                                       

- Allergies:                                                                                      

11:05 No Known Allergies;                                                                     vg1 

- Home Meds:                                                                                      

11:05 None [Active];                                                                          vg1 

- PMHx:                                                                                           

11:05 None;                                                                                   vg1 

- PSHx:                                                                                           

11:05 Appendectomy;                                                                           vg1 

                                                                                                  

- Immunization history:: Client reports having NOT received the Covid vaccine.                    

- Social history:: Smoking status: Reported history of juuling and/or vaping.                     

                                                                                                  

                                                                                                  

Assessment:                                                                                       

12:49 Reassessment: Patient appears in no apparent distress at this time. No changes from     vg1 

      previously documented assessment. Patient and/or family updated on plan of care and         

      expected duration. Pain level reassessed. Patient is alert, oriented x 3, equal             

      unlabored respirations, skin warm/dry/pink.                                                 

                                                                                                  

Vital Signs:                                                                                      

11:04  / 71; Pulse 83; Resp 16; Temp 98.6(TE); Pulse Ox 100% ; Weight 90.72 kg; Height  vg1 

      5 ft. 7 in. (170.18 cm); Pain 0/10;                                                         

11:04 Body Mass Index 31.32 (90.72 kg, 170.18 cm)                                             vg1 

                                                                                                  

ED Course:                                                                                        

10:56 Patient arrived in ED.                                                                  am2 

11:05 Triage completed.                                                                       vg1 

11:05 Arm band placed on.                                                                     vg1 

11:06 [facility]                                                          jh7 

11:06 Fabrizio Jennings MD is Attending Physician.                                             jh7 

11:08 COVID swab sent to lab.                                                                 vg1 

13:48 Aicha López, RN is Primary Nurse.                                                   iw  

                                                                                                  

Administered Medications:                                                                         

No medications were administered                                                                  

                                                                                                  

                                                                                                  

Outcome:                                                                                          

13:04 Discharge ordered by MD.                                                                7 

13:48 Patient left the ED.                                                                    iw  

                                                                                                  

Signatures:                                                                                       

Aicha López, RN                     RN   iw                                                   

Penny Chun                               2                                                  

Nai Diamond RN                    RN   1                                                  

Ricarda Kumar FNP                   KRISTAN  [facility]                                                  

                                                                                                  

Corrections: (The following items were deleted from the chart)                                    

11:08 11:04 Chief complaint: Patient states: has been coughing x 5 days and needs to be       vg1 

      tested for Covid in order to return to work. vg1                                            

                                                                                                  

**************************************************************************************************

## 2022-10-01 ENCOUNTER — HOSPITAL ENCOUNTER (EMERGENCY)
Dept: HOSPITAL 97 - ER | Age: 22
Discharge: HOME | End: 2022-10-01
Payer: SELF-PAY

## 2022-10-01 VITALS — SYSTOLIC BLOOD PRESSURE: 103 MMHG | DIASTOLIC BLOOD PRESSURE: 52 MMHG

## 2022-10-01 VITALS — TEMPERATURE: 99.2 F

## 2022-10-01 VITALS — OXYGEN SATURATION: 100 %

## 2022-10-01 DIAGNOSIS — K82.8: ICD-10-CM

## 2022-10-01 DIAGNOSIS — R10.11: Primary | ICD-10-CM

## 2022-10-01 LAB
ALBUMIN SERPL BCP-MCNC: 4 G/DL (ref 3.4–5)
ALP SERPL-CCNC: 80 U/L (ref 45–117)
ALT SERPL W P-5'-P-CCNC: 32 U/L (ref 12–78)
AST SERPL W P-5'-P-CCNC: 15 U/L (ref 15–37)
BUN BLD-MCNC: 12 MG/DL (ref 7–18)
GLUCOSE SERPLBLD-MCNC: 107 MG/DL (ref 74–106)
HCT VFR BLD CALC: 42.7 % (ref 39.6–49)
LIPASE SERPL-CCNC: 54 U/L (ref 73–393)
LYMPHOCYTES # SPEC AUTO: 2.3 K/UL (ref 0.7–4.9)
MCV RBC: 85.8 FL (ref 80–100)
PMV BLD: 7.4 FL (ref 7.6–11.3)
POTASSIUM SERPL-SCNC: 3.6 MMOL/L (ref 3.5–5.1)
RBC # BLD: 4.98 M/UL (ref 4.33–5.43)

## 2022-10-01 PROCEDURE — 80053 COMPREHEN METABOLIC PANEL: CPT

## 2022-10-01 PROCEDURE — 96374 THER/PROPH/DIAG INJ IV PUSH: CPT

## 2022-10-01 PROCEDURE — 99284 EMERGENCY DEPT VISIT MOD MDM: CPT

## 2022-10-01 PROCEDURE — 76705 ECHO EXAM OF ABDOMEN: CPT

## 2022-10-01 PROCEDURE — 81003 URINALYSIS AUTO W/O SCOPE: CPT

## 2022-10-01 PROCEDURE — 83690 ASSAY OF LIPASE: CPT

## 2022-10-01 PROCEDURE — 81015 MICROSCOPIC EXAM OF URINE: CPT

## 2022-10-01 PROCEDURE — 85025 COMPLETE CBC W/AUTO DIFF WBC: CPT

## 2022-10-01 PROCEDURE — 36415 COLL VENOUS BLD VENIPUNCTURE: CPT

## 2022-10-01 PROCEDURE — 96361 HYDRATE IV INFUSION ADD-ON: CPT

## 2022-10-01 NOTE — RAD REPORT
EXAM DESCRIPTION:  US - Abdomen Exam Limited - 10/1/2022 2:44 pm

 

CLINICAL HISTORY:  RUQ pain

 

COMPARISON:  Abdomen W Contrast dated 4/9/2018

 

FINDINGS:  No mobile gallstones seen. A small amount of echogenic sludge or sandlike stones identifie
d. There is no wall thickening or pericholecystic fluid.

 

No common duct stone or biliary tree dilatation identified.

 

IMPRESSION:  Minimal gallbladder sludge. No acute gallbladder or biliary tree finding.

## 2022-10-01 NOTE — EDPHYS
Physician Documentation                                                                           

 [facility]                                                                 

Name: Reynold Mcmillan                                                                          

Age: 22 yrs                                                                                       

Sex: Male                                                                                         

: 2000                                                                                   

MRN: [id number]                                                                                   

Arrival Date: 10/01/2022                                                                          

Time: 13:15                                                                                       

Account#: [id number]                                                                            

Bed 19                                                                                            

Private MD:                                                                                       

ED Physician Francine Lawson                                                                    

HPI:                                                                                              

10/01                                                                                             

13:35 This 22 yrs old  Male presents to ER via Ambulatory with complaints of          sd2 

      Abdominal Pain, Nausea/Vomiting.                                                            

13:35 22-year-old male presents with chief complaint of right-sided abdominal pain with       sd2 

      nausea and vomiting. He states that he visited another ER in [place] yesterday and had     

      a negative work-up including a CT scan at that time. He was told to take naproxen at        

      home which he has tried taking without relief. He then started having vomiting today.       

      He denies any fevers or diarrhea. He states he did take a laxative to help him have a       

      bowel movement which she was able to do but did not have any significant change in his      

      pain. He states he initially went to the ER yesterday due to difficulty urinating. He       

      has been able to urinate normally since then. He denies any dysuria or hematuria..          

                                                                                                  

Historical:                                                                                       

- Allergies:                                                                                      

13:22 No Known Allergies;                                                                     bm7 

- Home Meds:                                                                                      

13:22 None [Active];                                                                          bm7 

- PSHx:                                                                                           

13:22 Appendectomy;                                                                           bm7 

                                                                                                  

- Immunization history:: Adult Immunizations up to date, Client reports having NOT                

  received the Covid vaccine.                                                                     

- Social history:: Smoking status: Reported history of juuling and/or vaping.                     

                                                                                                  

                                                                                                  

ROS:                                                                                              

13:35 Constitutional: Negative for fever, chills, and weight loss, Eyes: Negative for injury, sd2 

      pain, redness, and discharge, Cardiovascular: Negative for chest pain, palpitations,        

      and edema, Respiratory: Negative for shortness of breath, cough, wheezing.                  

13:35 Back: Negative for injury and pain, : Negative for dysuria, frequency or hematuria.       

      MS/Extremity: Negative for injury and deformity, Skin: Negative for injury, rash, and       

      discoloration, Neuro: Negative for headache, numbness and tingling.                         

13:35 Abdomen/GI: Positive for abdominal pain, nausea and vomiting, Negative for diarrhea.        

                                                                                                  

Exam:                                                                                             

13:35 Constitutional:  This is a well developed, well nourished patient who is awake, alert,  sd2 

      and in no acute distress. Head/Face:  Normocephalic, atraumatic. Eyes:  EOMI, normal        

      conjunctiva bilaterally Chest/axilla:  Normal chest wall appearance and motion.             

      Nontender with no deformity.   Cardiovascular:  Regular rate and rhythm with a normal       

      S1 and S2.  No gallops, murmurs, or rubs.  2+ distal pulses. Respiratory:  Lungs have       

      equal breath sounds bilaterally, clear to auscultation and percussion.  No rales,           

      rhonchi or wheezes noted.  No increased work of breathing, no retractions or nasal          

      flaring. Abdomen/GI:  Soft, ND, RUQ TTP with positive Jones's sign, no rebound             

      tenderness Skin:  Warm, dry with normal turgor.  Normal color with no rashes, no            

      lesions, and no evidence of cellulitis. MS/ Extremity:  Pulses equal, no cyanosis.          

      Neurovascular intact.  Full, normal range of motion. Ambulatory without difficulty.         

      Psych:  Awake, alert, with orientation to person, place and time.  Behavior, mood, and      

      affect are within normal limits.                                                            

                                                                                                  

Vital Signs:                                                                                      

13:19  / 72; Pulse 100; Resp 16; Temp 99.2(TE); Pulse Ox 98% on R/A; Weight 90.72 kg    bm7 

      (R); Height 5 ft. 7 in. (170.18 cm); Pain 10/10;                                            

15:00  / 58; Pulse 70; Resp 16; Pulse Ox 100% ;                                         bp  

17:27  / 52; Pulse 73; Resp 16; Pulse Ox 100% ;                                         bp  

13:19 Body Mass Index 31.32 (90.72 kg, 170.18 cm)                                             bm7 

                                                                                                  

MDM:                                                                                              

13:25 Patient medically screened.                                                             sd2 

13:35 Differential diagnosis: Gastritis, cholecystitis, pancreatitis, SBO, diverticulitis,    sd2 

      kidney stone, appendicitis, UTI, dehydration, electrolyte abnormality among others.         

      Data reviewed: vital signs, nurses notes.                                                   

17:01 Data reviewed: lab test result(s), radiologic studies. Counseling: I had a detailed     sd2 

      discussion with the patient and/or guardian regarding: the historical points, exam          

      findings, and any diagnostic results supporting the discharge/admit diagnosis, lab          

      results, the need for outpatient follow up, to return to the emergency department if        

      symptoms worsen or persist or if there are any questions or concerns that arise at          

      home. Medical screen evaluation completed. EMTALA emergency medical condition absent.       

      ED course: Labs are grossly within normal clinical limits. Ultrasound shows gallbladder     

      sludge without evidence of cholecystitis. Pain improved after treatment. The patient        

      was advised of continued supportive care for his symptoms including dietary changes and     

      NSAIDs. He was also advised of acid reducing medications that he can take at home. He       

      will follow-up outpatient with his primary care provider and verbalizes understanding       

      of discharge plan and strict return precautions. .                                          

                                                                                                  

10/01                                                                                             

13:35 Order name: CBC with Diff                                                               sd2 

10/01                                                                                             

13:35 Order name: CMP                                                                         sd2 

10/01                                                                                             

13:35 Order name: Lipase                                                                      sd2 

10/01                                                                                             

13:35 Order name: Urine Microscopic Only                                                      sd2 

10/01                                                                                             

14:12 Order name: Urine Dipstick-Ancillary; Complete Time: 14:30                              EDMS

10/01                                                                                             

14:19 Order name: CBC with Automated Diff; Complete Time: 14:30                               EDMS

10/01                                                                                             

13:35 Order name: US Abdomen Limited                                                          sd2 

10/01                                                                                             

14:36 Order name: Comprehensive Metabolic Panel; Complete Time: 16:40                         EDMS

10/01                                                                                             

14:36 Order name: Lipase; Complete Time: 16:40                                                EDMS

10/01                                                                                             

15:28 Order name: Urine Microscopic Only; Complete Time: 16:40                                EDMS

10/01                                                                                             

15:31 Order name: US; Complete Time: 16:40                                                    EDMS

10/01                                                                                             

13:35 Order name: Urine Dipstick-Ancillary (obtain specimen); Complete Time: 14:02            sd2 

                                                                                                  

Administered Medications:                                                                         

14:02 Drug: Ketorolac 15 mg Route: IVP; Site: left antecubital;                               jl7 

17:27 Follow up: Response: No adverse reaction                                                bp  

14:02 Drug: NS 0.9% 500 ml Route: IV; Rate: bolus; Site: left antecubital;                    jl7 

17:29 Follow up: IV Status: Completed infusion; IV Intake: 500ml                              bp  

17:20 Drug: Ketorolac 15 mg Route: IVP; Site: left antecubital;                               bp  

17:27 Follow up: Response: No adverse reaction                                                bp  

                                                                                                  

                                                                                                  

Disposition Summary:                                                                              

10/01/22 17:03                                                                                    

Discharge Ordered                                                                                 

      Location: Home                                                                          sd2 

      Problem: new                                                                            sd2 

      Symptoms: have improved                                                                 sd2 

      Condition: Stable                                                                       sd2 

      Diagnosis                                                                                   

        - Right upper quadrant abdominal pain                                                 sd2 

        - Gallbladder sludge                                                                  sd2 

      Followup:                                                                               sd2 

        - With: Refugio Hamilton MD                                                                 

        - When: 2 - 3 days                                                                         

        - Reason: Recheck today's complaints, Continuance of care, Re-evaluation by your           

      physician                                                                                   

      Discharge Instructions:                                                                     

        - Discharge Summary Sheet                                                             sd2 

        - Biliary Colic, Adult                                                                sd2 

        - Gallbladder Eating Plan                                                             sd2 

      Forms:                                                                                      

        - Work release form                                                                   eb  

        - Medication Reconciliation Form                                                      sd2 

        - Thank You Letter                                                                    sd2 

        - Antibiotic Education                                                                sd2 

        - Prescription Opioid Use                                                             sd2 

      Prescriptions:                                                                              

        - Ibuprofen 800 mg Oral Tablet                                                             

            - take 1 tablet by ORAL route every 8 hours As needed take with food; 20 tablet;  sd2 

      Refills: 0, Product Selection Permitted                                                     

        - Pepcid 20 mg Oral Tablet                                                                 

            - take 1 tablet by ORAL route every 12 hours for 5 days; 10 tablet; Refills: 0,   sd2 

      Product Selection Permitted                                                                 

Signatures:                                                                                       

Dispatcher [employer]                           Melisa Chung RN                        RN   [id number]                                                  

Nelson Campbell RN                      RN   Ambar Hair RN                  RN   [id number]                                                  

Francine Lawson MD MD   sd2                                                  

                                                                                                  

**************************************************************************************************

## 2022-10-01 NOTE — XMS REPORT
Continuity of Care Document

                           Created on:2022



Patient:BRODY HOBBS

Sex:Male

:2000

External Reference #:[id number]





Demographics







                          Address                   [address] 



                                                    [place] [address]

 

                          Home Phone                [phone]

 

                          Mobile Phone              [phone]

 

                          Email Address             [email or web address]

 

                          Preferred Language        English

 

                          Marital Status            Unknown

 

                          Sabianism Affiliation     Unknown

 

                          Race                      Unknown

 

                          Additional Race(s)        White

 

                          Ethnic Group              Unknown









Author







                          Organization              [facility]

t

 

                          Address                   [address]



                                                    [place] [address]

 

                          Phone                     [phone]









Support







                Name            Relationship    Address         Phone

 

                Vanessa Temple  Sibling         Unavailable     [phone]

 

                Saranya Knight    Significant Other [address]   [phone]

0



                                                [place] [address] 









Care Team Providers







                    Name                Role                Phone

 

                    Pcp, Patient Does Not Have A Primary Care Physician +1-000-0



 

                    KATERINE STUBBS    Attending Clinician Unavailable

 

                    Katerine Stubbs MD Attending Clinician [phone]

 

                    Doctor Unassigned, No Name Attending Clinician Unavailable

 

                    Elena Cole Attending Clinician [phone]

 

                    Doris Agudelo Attending Clinician [phone]

 

                    DORIS ERAZO   Attending Clinician Unavailable

 

                    Ifeanyi Cotter MD  Attending Clinician [phone]

 

                    JANET DIXON    Attending Clinician Unavailable

 

                    Donita Hardwick Attending Clinician [phone]

 

                    DONITA GTZ     Attending Clinician Unavailable

 

                    Liberty Patterson   Attending Clinician [phone]

 

                    David Saenz Attending Clinician [phone]

 

                    Tangela Ly MD   Attending Clinician [phone]

 

                    DAVID LLOYD    Attending Clinician Unavailable

 

                    Tangela Ly MD   Admitting Clinician [phone]









Problems







       Condition Condition Condition Status Onset  Resolution Last   Treating Co

mments 

Source



       Name   Details Category        Date   Date   Treatment Clinician        



                                                 Date                 

 

       Obesity Obesity Disease Active 2020-0                             [facility]



       (BMI   (BMI                 8-07                               ity of



       30-39.9) 30-39.9)               00:00:                             [place]



                                   00                                 [facility]

 

       Acute  Acute  Disease Active -0                             [facility]



       appendicit appendicit               806                               it

y of



       is,    is,                  00:00:                             [place]



       uncomplica uncomplica               00                                 Me

dical



       florence henriquez                                                     [place]







Allergies, Adverse Reactions, Alerts







       Allergy Allergy Status Severity Reaction(s) Onset  Inactive Treating Comm

ents 

Source



       Name   Type                        Date   Date   Clinician        

 

       NO KNOWN Drug   Active                                           [facility]



       ALLERGIE Class                                                   ity of



       S                                                              [facility]







Social History







           Social Habit Start Date Stop Date  Quantity   Comments   Source

 

           History of                       Smokes tobacco            [facility]

 of



           tobacco use                       daily                 [facility]

 

           Exposure to 2022 Not sure              [facility]



           SARS-CoV-2 00:00:00   20:20:00                         [facility]



           (event)                                                [place]

 

           Tobacco use and 2020 User of smokeless            Un

iversity of



           exposure   00:00:00   00:00:00   tobacco               [facility]

 

           Tobacco Comment 2020 vapes daily,            [facility]

ity of



                      00:00:00   00:00:00   smokless tobacco            [place] Me

dical



                                            twice weekly per            [facility]



                                            patient report            

 

           Sex Assigned At 2000                       [facility]

y of



           Birth      00:00:00   00:00:00                         [facility]









                Smoking Status  Start Date      Stop Date       Source

 

                Smokes tobacco daily 2020 00:00:00                 [facility]

ity [facility]







Medications







       Ordered Filled Start  Stop   Current Ordering Indication Dosage Frequency

 Signature

                    Comments            Components          Source



     Medication Medication Date Date Medication? Clinician                (SIG) 

          



     Name Name                                                   

 

     ketorolac      2022             30mg      30 mg,           Unive

rs



     (TORADOL)      0- 10-01                          Slow IV           ity of



     injection      03:00: 02:02                          Push,           [place]



     30 mg      00   :00                           ONCE, 1           Medical



                                                  dose, On           Branch



                                                  22 at           



                                                  2200,           



                                                  Routine           

 

     sodium      2022      Yes            5mL       5 mL,           [facility]



     chloride      0-01                               Intravenou           ity o

f



     (NS)      01:25:                               s, PRN,           [place]



     injection 5      01                                 Starting           Medi

per



     mL                                           on Fri           [facility]



                                                  22 at           



                                                  ,           



                                                  Until           



                                                  Discontinu           



                                                  ed,            



                                                  Routine,           



                                                  IV line           



                                                  flushing           

 

     naproxen            Yes       [id number] 550mg      Take 1           U

nivers



     sodium 550      9-30                               tablet by           ity 

of



     mg tablet      00:00:                               mouth in                                            the            Medical



                                                  morning           [facility]



                                                  and 1           



                                                  tablet in           



                                                  the            



                                                  evening.           



                                                  Take with           



                                                  meals.           

 

     HYDROcodone      -0 202- No             1{tbl}      1 tablet,         

  [facility]



     -acetaminop      2-10 02-10                          Oral,           ity of



     hen (NORCO      05:15: 04:18                          ONCE, 1           [facility]

as



     5) 5-325 mg      00   :00                           dose, Tue           Med

ical



     tablet 1                                         21 at           [place]



     tablet                                         2315, ASAP           

 

     ondansetron      -0 - No             4mg       4 mg, Slow          

 [facility]



     (ZOFRAN                                IV Push,           ity of



     (PF))      07:30: 06:35                          ONCE, 1           [place]



     injection 4      00   :00                           dose, Wed           Med

ical



     mg                                           2/3/21 at           [facility]



                                                  0130, ASAP           

 

     morpHINE      -0 - No             4mg       4 mg, Slow           Un

rachid



     injection 4      -                          IV Push,           ity

 of



     mg        07:30: 06:35                          ONCE, 1           [place]



               00   :00                           dose, Wed           Medical



                                                  2/3/21 at           [facility]



                                                  0130, STAT           

 

     ibuprofen      -0      Yes       [id number] 800mg      Take 1         

  [facility]



     800 mg      2-03                [id number]           tablet by           ity of



     tablet      00:00:                               mouth           [place]



               00                                 every 8           Medical



                                                  (eight)           [facility]



                                                  hours.           

 

     ibuprofen      -0      Yes       [id number] 800mg      Take 1         

  [facility]



     800 mg      2-03                [id number]           tablet by           ity of



     tablet      00:00:                               mouth           [place]



               00                                 every 8           Medical



                                                  (eight)           [facility]



                                                  hours.           

 

     ibuprofen      -0      Yes       [id number] 800mg      Take 1         

  [facility]



     800 mg      2-03                [id number]           tablet by           ity of



     tablet      00:00:                               mouth           [place]



               00                                 every 8           Medical



                                                  (eight)           [facility]



                                                  hours.           

 

     ibuprofen      -0      Yes       [id number] 800mg      Take 1         

  [facility]



     800 mg      2-03                [id number]           tablet by           ity of



     tablet      00:00:                               mouth           [place]



               00                                 every 8           Medical



                                                  (eight)           [facility]



                                                  hours.           

 

     ibuprofen      -0      Yes       [id number] 800mg      Take 1         

  [facility]



     800 mg      2-03                [id number]           tablet by           ity of



     tablet      00:00:                               mouth           [place]



               00                                 every 8           Medical



                                                  (eight)           [facility]



                                                  hours.           

 

     ibuprofen      -0      Yes       [id number] 800mg      Take 1         

  [facility]



     800 mg      2-03                [id number]           tablet by           ity of



     tablet      00:00:                               mouth           [place]



               00                                 every 8           Medical



                                                  (eight)           [facility]



                                                  hours.           

 

     morpHINE      0 - No             4mg       4 mg, Slow           Un

rachid



     injection 4       08-08                          IV Push,           ity

 of



     mg        10:49: 11:48                          Q6HPRN,           [place]



               49   :49                           Starting           Medical



                                                  Sat 20           [facility]



                                                  at 0549,           



                                                  Until Sat           



                                                  20 at           



                                                  0648,           



                                                  Routine,           



                                                  Pain           



                                                  (scale           



                                                  7-10)           

 

     morpHINE      2020-0 2020- No             4mg       4 mg, Slow           Un

rachid



     injection 4       08-08                          IV Push,           ity

 of



     mg        04:46: 05:14                          Q6HPRN,           [place]



               55   :00                           Starting           Medical



                                                  Fri 20           Branch



                                                  at 2346,           



                                                  Until Sat           



                                                  20 at           



                                                  0014,           



                                                  Routine,           



                                                  Pain           



                                                  (scale           



                                                  7-10)           

 

     HYDROmorpho      2020-0 2020- No             .5mg      0.5 mg,           Un

rachid



     ne        -08                          Slow IV           ity of



     (DILAUDID)      01:00: 01:07                          Push,           [place]



     injection      00   :00                           ONCE, 1           Medical



     0.5 mg                                         dose, Fri           Branch



                                                  20 at           



                                                  2000,           



                                                  Routine<br           



                                                  >Use           



                                                  approved           



                                                  by             



                                                  (Faculty):           



                                                  ADC            



                                                  PROVIDER           

 

     simethicone      2020-0      Yes            80mg      80 mg,           Univ

ers



     (GAS RELIEF      8-08                               Oral,           ity of



     (SIMETHICON      00:53:                               Q6HPRN,           Matti

as



     E))       45                                 Starting           Medical



     chewable                                         Fri 20           [facility]

h



     tablet 80                                         at 1953,           



     mg                                           Until           



                                                  Discontinu           



                                                  ed,            



                                                  Routine,           



                                                  Gas            

 

     HYDROcodone      2020-0      Yes       4647 1{tbl}      Take 1           Un

rachid



     -acetaminop      8-08                               tablet by           ity

 of



     hen 5-325      00:00:                               mouth           [place]



     mg tablet      00                                 every 6           Medical



                                                  (six)           [facility]



                                                  hours as           



                                                  needed for           



                                                  Pain           



                                                  (scale           



                                                  7-10).           



                                                  Indication           



                                                  s: acute           



                                                  pain           

 

     ibuprofen      2020-0      Yes       [id number] 600mg      Take 1           U

nivers



     600 mg      8-08                               tablet by           ity of



     tablet      00:00:                               mouth           [place]



               00                                 every 6           Medical



                                                  (six)           [facility]



                                                  hours as           



                                                  needed for           



                                                  Pain           



                                                  (scale           



                                                  4-6).           

 

     HYDROcodone      2020-0      Yes       4647 1{tbl}      Take 1           Un

rachid



     -acetaminop      8-08                               tablet by           ity

 of



     hen 5-325      00:00:                               mouth           [place]



     mg tablet      00                                 every 6           Medical



                                                  (six)           Branch



                                                  hours as           



                                                  needed for           



                                                  Pain           



                                                  (scale           



                                                  7-10).           



                                                  Indication           



                                                  s: acute           



                                                  pain           

 

     ibuprofen      2020-0      Yes       [id number] 600mg      Take 1           U

nivers



     600 mg      8-08                               tablet by           ity of



     tablet      00:00:                               mouth           [place]



               00                                 every 6           Medical



                                                  (six)           [facility]



                                                  hours as           



                                                  needed for           



                                                  Pain           



                                                  (scale           



                                                  4-6).           

 

     HYDROcodone      2020-0      Yes       4647 1{tbl}      Take 1           Un

rachid



     -acetaminop      8-08                               tablet by           ity

 of



     hen 5-325      00:00:                               mouth           [place]



     mg tablet      00                                 every 6           Medical



                                                  (six)           [facility]



                                                  hours as           



                                                  needed for           



                                                  Pain           



                                                  (scale           



                                                  7-10).           



                                                  Indication           



                                                  s: acute           



                                                  pain           

 

     ibuprofen      2020-0      Yes       [id number] 600mg      Take 1           U

nivers



     600 mg      8-08                               tablet by           ity of



     tablet      00:00:                               mouth           [place]



               00                                 every 6           Medical



                                                  (six)           [facility]



                                                  hours as           



                                                  needed for           



                                                  Pain           



                                                  (scale           



                                                  4-6).           

 

     HYDROcodone      2020-0      Yes       4647 1{tbl}      Take 1           Un

rachid



     -acetaminop      8-08                               tablet by           ity

 of



     hen 5-325      00:00:                               mouth           [place]



     mg tablet      00                                 every 6           Medical



                                                  (six)           [facility]



                                                  hours as           



                                                  needed for           



                                                  Pain           



                                                  (scale           



                                                  7-10).           



                                                  Indication           



                                                  s: acute           



                                                  pain           

 

     ibuprofen      2020-0      Yes       [id number] 600mg      Take 1           U

nivers



     600 mg      8-08                               tablet by           ity of



     tablet      00:00:                               mouth           [place]



               00                                 every 6           Medical



                                                  (six)           [facility]



                                                  hours as           



                                                  needed for           



                                                  Pain           



                                                  (scale           



                                                  4-6).           

 

     HYDROcodone      2020-0      Yes       4647 1{tbl}      Take 1           Un

rachid



     -acetaminop      8-08                               tablet by           ity

 of



     hen 5-325      00:00:                               mouth           [place]



     mg tablet      00                                 every 6           Medical



                                                  (six)           Branch



                                                  hours as           



                                                  needed for           



                                                  Pain           



                                                  (scale           



                                                  7-10).           



                                                  Indication           



                                                  s: acute           



                                                  pain           

 

     ibuprofen      2020-0      Yes       [id number] 600mg      Take 1           [facility]

[facility]



     600 mg      8-08                               tablet by           [facility] of



     tablet      00:00:                               mouth           [place]



               00                                 every 6           Medical



                                                  (six)           [facility]anch



                                                  hours as           



                                                  needed for           



                                                  Pain           



                                                  (scale           



                                                  4-6).           

 

     HYDROcodone      2020-0      Yes       4647 1{tbl}      Take 1           [facility]

[facility]



     -acetaminop      8-08                               tablet by           ity

 of



     hen 5-325      00:00:                               mouth           [place]



     mg tablet      00                                 every 6           Medical



                                                  (six)           [facility]anch



                                                  hours as           



                                                  needed for           



                                                  Pain           



                                                  (scale           



                                                  7-10).           



                                                  Indication           



                                                  s: acute           



                                                  pain           

 

     ibuprofen      2020-0      Yes       [id number] 600mg      Take 1           [facility]

[facility]



     600 mg      8-08                               tablet by           [facility] of



     tablet      00:00:                               mouth           [place]



               00                                 every 6           Medical



                                                  (six)           [facility]anch



                                                  hours as           



                                                  needed for           



                                                  Pain           



                                                  (scale           



                                                  4-6).           

 

     HYDROcodone      2020-0      Yes       4647 1{tbl}      Take 1           Un

[facility]



     -acetaminop      8-08                               tablet by           ity

 of



     hen 5-325      00:00:                               mouth           [place]



     mg tablet      00                                 every 6           Medical



                                                  (six)           Branch



                                                  hours as           



                                                  needed for           



                                                  Pain           



                                                  (scale           



                                                  7-10).           



                                                  Indication           



                                                  s: acute           



                                                  pain           

 

     ibuprofen      2020-0      Yes       [id number] 600mg      Take 1           [facility]

[facility]



     600 mg      8-08                               tablet by           [facility] of



     tablet      00:00:                               mouth           [place]



               00                                 every 6           Medical



                                                  (six)           Branch



                                                  hours as           



                                                  needed for           



                                                  Pain           



                                                  (scale           



                                                  4-6).           

 

     HYDROcodone      2020-0      Yes       4647 1{tbl}      Take 1           Un

[facility]



     -acetaminop      8-08                               tablet by           ity

 of



     hen 5-325      00:00:                               mouth           [place]



     mg tablet      00                                 every 6           Medical



                                                  (six)           Branch



                                                  hours as           



                                                  needed for           



                                                  Pain           



                                                  (scale           



                                                  7-10).           



                                                  Indication           



                                                  s: acute           



                                                  pain           

 

     ibuprofen      2020-0      Yes       [id number] 600mg      Take 1           [facility]

[facility]



     600 mg      8-08                               tablet by           ity of



     tablet      00:00:                               mouth           [place]



               00                                 every 6           Medical



                                                  (six)           Branch



                                                  hours as           



                                                  needed for           



                                                  Pain           



                                                  (scale           



                                                  4-6).           

 

     HYDROcodone      2020-0      Yes       4647 1{tbl}      Take 1           Un

[facility]



     -acetaminop      8-08                               tablet by           ity

 of



     hen 5-325      00:00:                               mouth           [place]



     mg tablet      00                                 every 6           Medical



                                                  (six)           Branch



                                                  hours as           



                                                  needed for           



                                                  Pain           



                                                  (scale           



                                                  7-10).           



                                                  Indication           



                                                  s: acute           



                                                  pain           

 

     ibuprofen      2020-0      Yes       [id number] 600mg      Take 1           [facility]

[facility]



     600 mg      8-08                               tablet by           ity of



     tablet      00:00:                               mouth           [place]



               00                                 every 6           Medical



                                                  (six)           Branch



                                                  hours as           



                                                  needed for           



                                                  Pain           



                                                  (scale           



                                                  4-6).           

 

     HYDROcodone      2020-0      Yes       4647 1{tbl}      Take 1           Un

[facility]



     -acetaminop      8-08                               tablet by           ity

 of



     hen 5-325      00:00:                               mouth           [place]



     mg tablet      00                                 every 6           Medical



                                                  (six)           Branch



                                                  hours as           



                                                  needed for           



                                                  Pain           



                                                  (scale           



                                                  7-10).           



                                                  Indication           



                                                  s: acute           



                                                  pain           

 

     ibuprofen      2020-0      Yes       [id number] 600mg      Take 1           [facility]

[facility]



     600 mg      8-08                               tablet by           [facility] of



     tablet      00:00:                               mouth           [place]



               00                                 every 6           Medical



                                                  (six)           Branch



                                                  hours as           



                                                  needed for           



                                                  Pain           



                                                  (scale           



                                                  4-6).           

 

     HYDROcodone      2020-0      Yes       4647 1{tbl}      Take 1           Un

[facility]



     -acetaminop      8-08                               tablet by           ity

 of



     hen 5-325      00:00:                               mouth           [place]



     mg tablet      00                                 every 6           Medical



                                                  (six)           Branch



                                                  hours as           



                                                  needed for           



                                                  Pain           



                                                  (scale           



                                                  7-10).           



                                                  Indication           



                                                  s: acute           



                                                  pain           

 

     ibuprofen      2020-0      Yes       [id number] 600mg      Take 1           [facility]

[facility]



     600 mg      8-08                               tablet by           [facility] of



     tablet      00:00:                               mouth           [place]



               00                                 every 6           Medical



                                                  (six)           [facility]anch



                                                  hours as           



                                                  needed for           



                                                  Pain           



                                                  (scale           



                                                  4-6).           

 

     HYDROcodone      2020-0 2020- No        4647 1{tbl}      Take 1           [facility]

[facility]



     -acetaminop      8-08 08-08                          tablet by           it

y of



     hen 5-325      00:00: 00:00                          mouth           [place]



     mg tablet      00   :00                           every 6           Medical



                                                  (six)           Branch



                                                  hours as           



                                                  needed for           



                                                  Pain           



                                                  (scale           



                                                  7-10) for           



                                                  up to 7           



                                                  days.           



                                                  Indication           



                                                  s: acute           



                                                  pain           

 

     ibuprofen      2020-0      Yes            600mg      600 mg,           [facility]

ers



     (IBU)      8-07                               Oral, Q6H,           [facility]



     tablet 600      23:00:                               First dose           [facility]

[facility]



     mg        00                                 on Fri           [facility]



                                                  20 at           [facility]



                                                  1800,           



                                                  Until           



                                                  Discontinu           



                                                  ed,            



                                                  Routine           

 

     acetaminoph      -0      Yes            500mg      500 mg,           [facility]

[facility]



     en        8-07                               Oral, Q6H,           [facility]



     (TYLENOL)      23:00:                               First dose           [facility]

[facility]



     tablet 500      00                                 on Fri           [facility]



     mg                                           20 at           [facility]



                                                  1800,           



                                                  Until           



                                                  Discontinu           



                                                  ed,            



                                                  Routine           

 

     HYDROmorpho       2020- No             .2mg      0.2 mg,           Un

[facility]



     ne                                  Slow IV           [facility] of



     (DILAUDID)      22:40: 22:59                          Push,           [place]



     injection      39   :26                           Q5MIN PRN,           Medi

per



     0.2 mg                                         10 doses,           [facility]



                                                  Starting           



                                                  Fri 20           



                                                  at 1740,           



                                                  Until Fri           



                                                  20 at           



                                                  1759,           



                                                  Routine,           



                                                  Pain           



                                                  (scale           



                                                  7-10),           



                                                  PACU<br>Us           



                                                  e approved           



                                                  by             



                                                  (Faculty):           



                                                  PACU USE           



                                                  -ANESTHESI           



                                                  A              



                                                  SERVICE-HY           



                                                  DROMORPHON           



                                                  E              



                                                  INJECTIONS           

 

     FENTanyl PF      -0 2020- No             25ug      25 mcg,           [facility]

[facility]



     (SUBLIMAZE      -                          Slow IV           ity o

f



     (PF))      21:34: 21:54                          Push,           [place]



     injection      53   :00                           Q5MIN PRN,           Medi

per



     25 mcg                                         4 doses,           [facility]



                                                  Starting           



                                                  20           



                                                  at 1634,           



                                                  Until 20 at           



                                                  1654,           



                                                  Routine,           



                                                  Pain           



                                                  (scale           



                                                  7-10),           



                                                  PACU           

 

     morpHINE      -0 2020- No             2mg       2 mg, Slow           Un

[facility]



     injection 2       0808                          IV Push,           ity

 of



     mg        21:15: 04:47                          Q6HPRN,           [place]



               00   :16                           Starting           Medical



                                                  20           [facility]anch



                                                  at 1615,           



                                                  Until 20 at           



                                                  2347,           



                                                  Routine,           



                                                  Pain           



                                                  (scale           



                                                  7-10)           

 

     traMADol      2020-0      Yes            100mg      100 mg,           [facility]

rs



     (ULTRAM)      8-07                               Oral,           [facility]



     tablet 100      21:03:                               Q6HPRN,           [place]

[place]



     mg        48                                 Starting           Medical



                                                  20           [facility]



                                                  at 1603,           



                                                  Until           



                                                  Discontinu           



                                                  ed,            



                                                  Routine,           



                                                  Pain           



                                                  (scale           



                                                  4-6)           

 

     traMADol      2020-0      Yes            50mg      50 mg,           [facility]



     (ULTRAM)      807                               Oral,           [facility]



     tablet 50      21:03:                               Q6HPRN,           [place]



     mg        36                                 Starting           Medical



                                                  20           [facility]anch



                                                  at 1603,           



                                                  Until           



                                                  Discontinu           



                                                  ed,            



                                                  Routine,           



                                                  Pain           



                                                  (scale           



                                                  1-3)           

 

     piperacilli      -0 2020- No             3.375g      3.375 g,          

 [facility]



     n-tazobacta      08                          IV             [facility] of



     m (ZOSYN)      10:00: 16:00                          Piggyback,           [facility]

[facility]



     3.375 g in      00   :21                           Q6H ABX,           Medic

al



     NaCl 0.9%                                         First dose           [facility]

ch



     (NS) 100 mL                                         (after           



     MINI-BAG                                         last           



                                                  reorder)           



                                                  on 20 at           



                                                  0500,           



                                                  Until           



                                                  Discontinu           



                                                  ed, 100           



                                                  mL<br>Reas           



                                                  on for           



                                                  Anti-Infec           



                                                  tive:           



                                                  Documented           



                                                  Infection<           



                                                  br>Documen           



                                                  [place]            



                                                  Infection           



                                                  Site:           



                                                  Abdominal<           



                                                  br>Duratio           



                                                  n of           



                                                  Therapy: 7           



                                                  days           

 

     NaCl 0.9%      -0 2020- No             1000mL      at 125           [facility]i

vers



     (NS) IV       08-07                          mL/hr, IV           [facility] of



     infusion      05:45: 22:52                          Infusion,           [facility]

[facility]



     1,000 mL      00   :44                           CONTINUOUS           Medic

al



                                                  , Starting           [facility]



                                                  20           



                                                  at 0045,           



                                                  Until 20 at           



                                                  1752,           



                                                  Routine           

 

     ondansetron      -0      Yes            4mg       4 mg, Slow           

[facility]



     (ZOFRAN                                     IV Push,           [facility]



     (PF))      05:42:                               Q6HPRN,           [place]



     injection 4      45                                 Starting           Medi

per



     mg                                           Fri 20           [facility]



                                                  at 0042,           



                                                  Until           



                                                  Discontinu           



                                                  ed,            



                                                  Routine,           



                                                  Nausea and           



                                                  Vomiting           



                                                  (N/V)           

 

     morpHINE      -2020- No             2mg       2 mg, Slow           Un

[facility]



     injection 2                                IV Push,           ity

 of



     mg        05:42: 21:04                          Q4HPRN,           [place]



               41   :20                           Starting           Medical



                                                  Fri 20           Branch



                                                  at 0042,           



                                                  Until Fri           



                                                  20 at           



                                                  1604,           



                                                  Routine,           



                                                  Pain           



                                                  (scale           



                                                  7-10)           

 

     piperacilli      -2020- No             3.375g      3.375 g,          

 Univers



     n-tazobacta                                IV             ity of



     m (ZOSYN)      04:15: 03:58                          Piggyback,           T

exas



     3.375 g in      00   :00                           ONCE, 1           Medica

l



     NaCl 0.9%                                         dose, Thu           Branc

h



     (NS) 100 mL                                         20 at           



     MINI-BAG                                         2315, 100           



                                                  mL<br>Reas           



                                                  on for           



                                                  Anti-Infec           



                                                  tive:           



                                                  Documented           



                                                  Infection<           



                                                  br>Documen           



                                                  florence            



                                                  Infection           



                                                  Site:           



                                                  Abdominal<           



                                                  br>Duratio           



                                                  n of           



                                                  Therapy: 7           



                                                  days           

 

     iohexol      2020- No             120mL      120 mL,           Unive

rs



     (OMNIPAQUE                                Intravenou           it

y of



     350       02:45: 02:45                          s, ONCE, 1           [place]



     BULK-150      00   :00                           dose, Thu           Medica

l



     mL)                                          20 at           Branch



     injection                                         2145,           



     120 mL                                         Routine           

 

     NaCl 0.9%      2020- No             1000mL      at 999           Uni

vers



     (NS) bolus                                mL/hr,           ity of



     infusion      02:30: 04:26                          1,000 mL,           Matti

as



     1,000 mL      00   :00                           IV             Medical



                                                  Infusion,           [place]



                                                  ONCE, 1           



                                                  dose, Thu           



                                                  20 at           



                                                  2130, ASAP           

 

     ondansetron      - 2020- No             4mg       4 mg, Slow          

 Univers



     (ZOFRAN                                IV Push,           ity of



     (PF))      02:30: 01:30                          ONCE, 1           [place]



     injection 4      00   :00                           dose, Thu           Med

ical



     mg                                           20 at           Branch



                                                  2130, ASAP           

 

     morpHINE       2020- No             4mg       4 mg, Slow           Un

rachid



     injection 4                                IV Push,           ity

 of



     mg        02:30: 01:30                          ONCE, 1           [place]



               00   :00                           dose, Thu           Medical



                                                  20 at           Branch



                                                  2130, STAT           

 

     famotidine       2020- No             20mg      20 mg,           Univ

ers



     (PEPCID                                Slow IV           ity of



     (PF))      02:30: 01:30                          Push,           [place]



     injection      00   :00                           ONCE, 1           Medical



     20 mg                                         dose, Thu           Branch



                                                  20 at           



                                                  2130, ASAP           







Vital Signs







             Vital Name   Observation Time Observation Value Comments     Source

 

             Systolic blood 2022-10-01 03:33:00 123 mm[Hg]                Univer

sity of



             [facility]

 

             Diastolic blood 2022-10-01 03:33:00 77 mm[Hg]                 Unive

rsity of



             [facility]

 

             Heart rate   2022-10-01 03:33:00 63 /min                   [facility]

 

             Body temperature 2022-10-01 03:33:00 36.61 Debora                 [facility]

 

             Respiratory rate 2022-10-01 03:33:00 18 /min                   [facility]

 

             Oxygen saturation in 2022-10-01 03:33:00 99 /min                   

[facility]



             Arterial blood by                                        [place] [facility]

per



             Pulse oximetry                                        [place]

 

             Body height  2022-10-01 01:21:00 170.2 cm                  [facility]

 

             Body weight  2022-10-01 01:21:00 84.823 kg                 [facility]

 

             BMI          2022-10-01 01:21:00 29.29 kg/m2               [facility]

 

             Systolic blood 2021-02-10 05:40:00 126 mm[Hg]                Univer

sity of



             [facility]

 

             Diastolic blood 2021-02-10 05:40:00 88 mm[Hg]                 Unive

rsity [facility]

 

             Heart rate   2021-02-10 05:40:00 102 /min                  [facility]

 

             Respiratory rate 2021-02-10 05:40:00 18 /min                   [facility]

 

             Oxygen saturation in 2021-02-10 05:40:00 99 /min                   

[facility] of



             Arterial blood by                                        [place] [facility]i

per



             Pulse oximetry                                        Branch

 

             Body temperature 2021-02-10 03:49:00 37.61 Debora                 Univ

ersity of



                                                                 [place] [facility]



                                                                 Branch

 

             Body weight  2021-02-10 03:49:00 108.863 kg                Universi

ty of



                                                                 [place] [facility]



                                                                 Branch

 

             BMI          2021-02-10 03:49:00 37.59 kg/m2               Universi

ty of



                                                                 [place] [facility]



                                                                 Branch

 

             Systolic blood 2021 08:03:00 136 mm[Hg]                Univer

sity of



             pressure                                            [place] [facility]



                                                                 Branch

 

             Diastolic blood 2021 08:03:00 91 mm[Hg]                 Unive

rsity of



             pressure                                            [place] [facility]



                                                                 Branch

 

             Heart rate   2021 08:03:00 93 /min                   Universi

ty of



                                                                 [place] [facility]



                                                                 Branch

 

             Respiratory rate 2021 08:03:00 18 /min                   Univ

ersity of



                                                                 [place] [facility]



                                                                 Branch

 

             Oxygen saturation in 2021 08:03:00 99 /min                   

[facility]



             Arterial blood by                                        [place] [facility]

per



             Pulse oximetry                                        Branch

 

             Body temperature 2021 05:32:00 37.28 Debora                 Univ

ersity of



                                                                 [place] [facility]



                                                                 Branch

 

             Body height  2021 05:32:00 170.2 cm                  Universi

ty of



                                                                 [place] [facility]



                                                                 Branch

 

             Body weight  2021 05:32:00 63.504 kg                 Universi

ty of



                                                                 [place] [facility]



                                                                 Branch

 

             BMI          2021 05:32:00 21.93 kg/m2               Universi

ty of



                                                                 [place] [facility]



                                                                 Branch

 

             Body temperature 2020 18:34:00 36.11 Debora                 Univ

ersity of



                                                                 [place] [facility]



                                                                 Branch

 

             Respiratory rate 2020 18:34:00 18 /min                   Univ

ersity of



                                                                 [place] [facility]



                                                                 Branch

 

             Body weight  2020 18:34:00 89.359 kg                 Universi

ty of



                                                                 [place] [facility]



                                                                 Branch

 

             Systolic blood 2020 18:34:00 126 mm[Hg]                Univer

sity of



             pressure                                            [place] [facility]



                                                                 Branch

 

             Diastolic blood 2020 18:34:00 81 mm[Hg]                 Unive

rsity of



             pressure                                            [place] [facility]



                                                                 Branch

 

             Heart rate   2020 18:34:00 76 /min                   Universi

ty of



                                                                 [place] [facility]



                                                                 Branch

 

             Systolic blood 2020 16:02:00 112 mm[Hg]                Univer

sity of



             pressure                                            [place] [facility]



                                                                 Branch

 

             Diastolic blood 2020 16:02:00 57 mm[Hg]                 Unive

rsity of



             pressure                                            [facility]

 

             Heart rate   2020 16:02:00 74 /min                   [facility]

 

             Body temperature 2020 16:02:00 36.61 Debora                 [facility]

 

             Respiratory rate 2020 16:02:00 20 /min                   [facility]

 

             Oxygen saturation in 2020 16:02:00 99 /min                   

[facility]



             Arterial blood by                                        [place] [facility]

per



             Pulse oximetry                                        [place]

 

             Body height  2020 14:46:00 170.2 cm                  [facility]

 

             Body weight  2020 14:46:00 90.5 kg                   [facility]

 

             BMI          2020 14:46:00 31.24 kg/m2               [facility]







Procedures







                Procedure       Date / Time     Performing Clinician Source



                                Performed                       

 

                CT ABDOMEN PELVIS WO 2022-10-01 02:11:32 Katerine Stubbs [facility]



                CONTRAST                                        [facility]

 

                URINALYSIS      2022-10-01 01:30:00 Katerine Stubbs [facility]

 

                LIPASE          2022-10-01 01:27:00 Katerine Stubbs [facility]

 

                COMP. METABOLIC PANEL 2022-10-01 01:27:00 Katerine Stubbs [facility]



                ([id number])                                         [facility]

 

                CBC WITH DIFF   2022-10-01 01:27:00 Katerine Stubbs [facility]

 

                NOTICE OF PRIVACY 2022-10-01 01:16:35 Doctor Unassigned, No Univ

[facility]



                PRACTICES                       Name            [facility]

 

                CONSENT/REFUSAL FOR 2022-10-01 01:16:00 Doctor Unassigned, No 

[facility]



                DIAGNOSIS AND TREATMENT                 Name            [facility] TESTICULAR TORSION 2021-02-10 05:07:54 Elena Queen [facility]

 

                URINALYSIS      2021-02-10 04:18:00 Elena Queen [place] o

f [facility]

 

                US TESTICULAR TORSION 2021 08:31:51 Ifeanyi Cotter [facility]

 

                HEPATIC FUNCTION PANEL 2021 06:34:00 Ifeanyi [facility]

[facility]



                ([id number]) (ALB,T.PRO,BILI                                 Medical 

Branch



                T,BU/BC,ALT,AST,ALK                                 



                PHOS)                                           

 

                BASIC METABOLIC PANEL 2021 06:34:00 [facility]



                (NA, K, CL, CO2,                                 Medical Branch



                GLUCOSE, BUN,                                   



                CREATININE, CA)                                 

 

                CBC WITH DIFF   2021 [facility]34[facility]

 

                PROTHROMBIN TIME / INR 2021 06:34:00 Vahe [facility]

 

                ACTIVATED PARTIAL 2021 06:34:00 Vahe [facility]



                THRMPLAS SATHYA                                    [facility]

 

                COVID-19 (ID NOW RAPID 2021 06:34:00 [facility]



                TESTING)                                        Medical Branch

 

                URINALYSIS      2021 05:40:00 Vahe [facility]

 

                NOTICE OF PRIVACY 2021 05:28:30 Doctor Unassigned, No [facility]



                PRACTICES                       Name            Medical [place]

 

                CONSENT/REFUSAL FOR 2021 05:27:16 Doctor Unassigned, No [facility]

[facility]



                DIAGNOSIS AND TREATMENT                 Name            Medical 

Branch

 

                CONSENT/REFUSAL FOR 2020 18:26:52 Doctor Unassigned, No [facility]

[facility]



                DIAGNOSIS AND TREATMENT                 Name            Medical 

[place]

 

                BASIC METABOLIC PANEL 2020 [facility]:05:00 [facility]



                (NA, K, CL, CO2,                                 Medical Branch



                GLUCOSE, BUN,                                   



                CREATININE, CA)                                 

 

                CBC WITH DIFF   2020 [facility]:05[facility]00 [facility]

 

                CT ABDOMEN PELVIS W 2020 02:39:28 David Lloyd Univers

[facility] [place]



                CONTRAST                                        Medical Branch

 

                COVID-19 (ID NOW RAPID 2020 02:28:00 [facility]



                TESTING)                                        Medical Branch

 

                URINALYSIS      2020 01:23:00 Prema [facility]

 

                LIPASE          2020 01:22:00 Prema [facility] [place]

 

                HEPATIC FUNCTION PANEL 2020 01:22:00 Prema, David Univ

ersity of [place]



                ([id number]) (ALB,T.PRO,BILI                                 Medical 

Branch



                T,BU/BC,ALT,AST,ALK                                 



                PHOS)                                           

 

                BASIC METABOLIC PANEL 2020 01:22:00 David Lloyd [facility]



                (NA, K, CL, CO2,                                 Medical Branch



                GLUCOSE, BUN,                                   



                CREATININE, CA)                                 

 

                CBC WITH DIFF   2020 01:22:00 David Lloyd [facility]

 

                NOTICE OF PRIVACY 2020 00:53:21 Doctor Unassigned, No [facility]



                PRACTICES                       Name            [facility]

 

                CONSENT/REFUSAL FOR 2020 00:52:28 Doctor Unassigned, No 

[facility]



                DIAGNOSIS AND TREATMENT                 Name            [facility]







Plan of Care







             Planned Activity Planned Date Details      Comments     Source







Encounters







        Start   End     Encounter Admission Attending Care    Care    Encounter 

Source



        Date/Time Date/Time Type    Type    Clinicians Facility Department ID   

   

 

        2021-10-30         Emergency                 [facility]    [id number] 

Univers



        22:41:01                                                         ity [facility]

 

        2021-10-30         Emergency                 [facility]    [id number] 

Univers



        21:25:25                                                         ity of



                                                                        [facility]

 

        2022 Emergency X       [facility]    ERT     [id number]

[id number] Univers



        20:20:00 22:45:00                 KATERINE                          ity [facility]

 

        2022 Emergency         [facility]    [id number]

[id number] Univers



        20:20:00 22:45:00                 Katerine HURST [id number]         

ity of



                                                [place] [id number]         [facility]  [phone]         Medi

per



                                                        084             Branch

 

        2022 Orders          Doctor MCGOWAN    [id number]

[id number] Univers



        00:00:00 00:00:00 Only            Unassigned, JAIDEN   [id number]       

  ity of



                                        [facility] 4.2.7.2.[address] [place] [address]



                                                        [phone]         Medi

per



                                                        009             Branch

 

        2021 2021-02-10 Emergency         [facility]    [id number]

[id number] Univers



        21:51:00 00:02:00                 Elena Hurst [id number]         i

ty of



                                                [place] [id number] [place] [address]  [phone]         [address]

 

        2021 Office          [facility]    [id number]

[id number] 



        14:15:47 14:45:47 Visit           [facility]  [id number]         



                                                [place]   [address].7.[address] [place] [address]    [phone]         



                                                Primary & [address]             



                                                Specialty                 



                                                Care                    

 

        2021 Office          [facility]    [id number]

[id number] Univers



        14:15:47 14:45:47 Visit           [facility]  [id number]         it

y of



                                                [place]   [address].2.7.[address] [place] [address]    [phone]         Medi

per



                                                Primary & [facility]             Branch



                                                Specialty                 



                                                Care                    

 

        [facility]       [facility]    [id number]

[id number] Univers



        14:30:00 14:30:00                 DORIS                  [id number]  [facility]

 

        2021 Outpatient BRENNAN ERAZO [facility]    [id number]

[id number] Univers



        14:30:00 14:30:00                 [facility]

 

        2021 Emergency         [facility]    [id number]

[id number] Univers



        23:41:00 02:57:00                 Ifeanyi Hurst [id number]         i

ty of



                                                [address] [place] [address]  [phone]         [address]

 

        2020 Outpatient BRENNAN DIXON [facility]    [id number]

[id number] Univers



        16:15:00 16:15:00                 JANET                          [facility]

 

        2020 Office          [facility]    [id number]

[id number] Univers



        14:04:13 14:04:13 Visit           Donita Hurst [id number]         

ity of



                                                [place] [address] [place], [place] [address]ess [phone]         [address]                 

 

        2020 Outpatient R       ULISSES  [facility]    [id number]

[id number] Univers



        13:30:00 13:30:00                 DONITA [facility]

 

        2020 Orders          Doctor  JUAN M    [id number]

[id number] Univers



        00:00:00 00:00:00 Only            Unassigned, JAIDEN   [id number]       

  ity of



                                        [facility] 4.2.7.2.686         Matti

as



                                                        [phone]         Medi

per



                                                        009             [place]

 

        2020-08-10 2020-08-10 Transition         Amalia Patterson  [id number]

[id number] Univers



        00:00:00 00:00:00 of Care         Liberty Marlow   [id number]        

 ity of



                                                [place]   4.2.7.2.686         [place]

s



                                                        [phone]         Medi

per



                                                        [address]             [place]

 

        [facility]         [facility]    [id number]

[id number] 

[facility]



        19:57:00 14:33:00 Encounter         Katerine Stubbs Svitlana [id number]

0         ity of



                                        Tangela Ly [id number]      

   [facility]  [id number]         Medi

per



                                                        [address]             [place]

 

        2020 Emergency X       [facility]    ERT     [id number]

[address] [facility]



        19:57:00 19:57:00                 DAVID                         [facility]







Results







           Test Description Test Time  Test Comments Results    Result Comments 

Source









                    Complete Metabolic Panel 2022-10-01 01:48:30 









                      Test Item  Value      Reference Range Interpretation Comme

nts









             NA (test code = 0706176238) 142 mmol/L   135-145                   

 

             K (test code = 4031041273) 3.9 mmol/L   3.5-5                     

 

             CL (test code = 8636839842) 104 mmol/L                       

 

             CO2 TOTAL (test code = 4850119241) 24 mmol/L    23-31              

       

 

             AGAP (test code = 6435109920)              2-16                    

  

 

             BUN (test code = 7968189744) 9 mg/dL      7-23                     

 

 

             GLUCOSE (test code = 7635652736) 125 mg/dL           H       

     

 

             CREATININE (test code = 0.70 mg/dL   0.6-1.25                  



             9320252532)                                         

 

             TOTAL BILI (test code = 0.6 mg/dL    0.1-1.1                   



             2216614535)                                         

 

             CALCIUM (test code = 0424006988) 10.0 mg/dL   8.6-10.6             

     

 

             T PROTEIN (test code = 3291516373) 8.3 g/dL     6.3-8.2      H     

       

 

             ALBUMIN (test code = 0312248165) 5.2 g/dL     3.5-5        H       

     

 

             ALK PHOS (test code = 2982302144) 81 U/L                     

      

 

             ALTv (test code = 1742-6) 35 U/L       5-50                      

 

             AST(SGOT) (test code = 0540687481) 32 U/L       13-40              

       

 

             eGFR (test code = 3840428017)              mL/min/1.73m2           

   

 

             RICKEY (test code = RICKEY) [employer] of [employer]ular                    

       



                          Filtration Rate (GFR) and Staging                     

      



                          of Kidney Disease*                           



                          +-----------------------+--------                     

      



                          -------------+-------------------                     

      



                          ------+| GFR (mL/min/1.73 m2) ?|                      

     



                          With Kidney Damage ?| ?Without                        

   



                          Kidney                                 



                          Damage+-----------------------+--                     

      



                          -------------------+-------------                     

      



                          ------------+| ?>90 ? ? ? ? ? ? ?                     

      



                          ? ?| ?Stage one ? ? ? ? ?| ?                          

 



                          Normal ? ? ? ? ? ? ?                           



                          ?+-----------------------+-------                     

      



                          --------------+------------------                     

      



                          -------+| ?60-89 ? ? ? ? ? ? ? ?|                     

      



                          ?Stage two ? ? ? ? ?| ? Decreased                     

      



                          GFR ? ? ? ?                            



                          +-----------------------+--------                     

      



                          -------------+-------------------                     

      



                          ------+| ?30-59 ? ? ? ? ? ? ? ?|                      

     



                          ?Stage three ? ? ? ?| ? Stage                         

  



                          three ? ? ? ? ?                           



                          +-----------------------+--------                     

      



                          -------------+-------------------                     

      



                          ------+| ?15-29 ? ? ? ? ? ? ? ?|                      

     



                          ?Stage four ? ? ? ? | ? Stage                         

  



                          four ? ? ? ? ?                           



                          ?+-----------------------+-------                     

      



                          --------------+------------------                     

      



                          -------+| ?<15 (or dialysis) ? ?|                     

      



                          ?Stage five ? ? ? ? | ? Stage                         

  



                          five ? ? ? ? ?                           



                          ?+-----------------------+-------                     

      



                          --------------+------------------                     

      



                          -------+ *Each stage assumes the                      

     



                          associated GFR level has been in                      

     



                          effect for at least three months.                     

      



                          ?Stages 1 to 5, with or without                       

    



                          kidney disease, indicate chronic                      

     



                          kidney disease. Notes:                           



                          Determination of stages one and                       

    



                          two (with eGFR >59mL/min/1.73 m2)                     

      



                          requires estimation of kidney                         

  



                          damage for at least three months                      

     



                          as defined by structural or                           



                          functional abnormalities of the                       

    



                          kidney, manifested by                           



                          either:Pathological abnormalities                     

      



                          or Markers of kidney damage                           



                          (including abnormalities in the                       

    



                          composition of the blood or urine                     

      



                          or abnormalities in imaging                           



                          tests).                                

 

             Lab Interpretation (test code = Abnormal                           

    



             43296-1)                                            



[facility]





             Test Item    Value        Reference Range Interpretation Comments

 

             LIPASE (test code = 6894575045) 23 U/L       0-220                 

    

 

             Lab Interpretation (test code = Normal                             

    



             60985-5)                                            



[facility]22-10-01 01:37:47





             Test Item    Value        Reference Range Interpretation Comments

 

             WBC (test code =              See_Comment  H             [Automated



             6690-2)                                             message] The sy

stem



                                                                 which generated



                                                                 this result



                                                                 transmitted



                                                                 reference range

:



                                                                 4.20 - 10.70



                                                                 10*3/?L. The



                                                                 reference range

 was



                                                                 not used to



                                                                 interpret this



                                                                 result as



                                                                 normal/abnormal

.

 

             RBC (test code =              See_Comment                [Automated



             789-8)                                              message] The sy

stem



                                                                 which generated



                                                                 this result



                                                                 transmitted



                                                                 reference range

:



                                                                 4.26 - 5.52



                                                                 10*6/?L. The



                                                                 reference range

 was



                                                                 not used to



                                                                 interpret this



                                                                 result as



                                                                 normal/abnormal

.

 

             HGB (test code = 16.3 g/dL    12.2-16.4                 



             718-7)                                              

 

             HCT (test code = 46.7 %       38.4-49.3                 



             4544-3)                                             

 

             MCV (test code = 84.8 fL      81.7-95.6                 



             787-2)                                              

 

             MCH (test code = 29.6 pg      26.1-32.7                 



             785-6)                                              

 

             MCHC (test code = 34.9 g/dL    31.2-35                   



             786-4)                                              

 

             RDW-SD (test code = 39.0 fL      38.5-51.6                 



             98599-1)                                            

 

             RDW-CV (test code = 12.7 %       12.1-15.4                 



             788-0)                                              

 

             PLT (test code =              See_Comment  H             [Automated



             777-3)                                              message] The sy

stem



                                                                 which generated



                                                                 this result



                                                                 transmitted



                                                                 reference range

:



                                                                 150 - 328 10*3/

?L.



                                                                 The reference r

horacio



                                                                 was not used to



                                                                 interpret this



                                                                 result as



                                                                 normal/abnormal

.

 

             MPV (test code = 9.0 fL       9.8-13       L            



             84529-8)                                            

 

             NRBC/100 WBC (test              See_Comment                [Automat

ed



             code = 1143262198)                                        message] 

The system



                                                                 which generated



                                                                 this result



                                                                 transmitted



                                                                 reference range

:



                                                                 0.0 - 10.0 /100



                                                                 WBCs. The refer

ence



                                                                 range was not u

sed



                                                                 to interpret th

is



                                                                 result as



                                                                 normal/abnormal

.

 

             NRBC x10^3 (test code              See_Comment                [Auto

mated



             = 9082727984)                                        message] The s

ystem



                                                                 which generated



                                                                 this result



                                                                 transmitted



                                                                 reference range

:



                                                                 10*3/?L. The



                                                                 reference range

 was



                                                                 not used to



                                                                 interpret this



                                                                 result as



                                                                 normal/abnormal

.

 

             GRAN MAT (NEUT) % 79.8 %                                 



             (test code = 770-8)                                        

 

             IMM GRAN % (test code 0.30 %                                 



             = 0321542865)                                        

 

             LYMPH % (test code = 14.4 %                                 



             736-9)                                              

 

             MONO % (test code = 4.7 %                                  



             5905-5)                                             

 

             EOS % (test code = 0.5 %                                  



             713-8)                                              

 

             BASO % (test code = 0.3 %                                  



             706-2)                                              

 

             GRAN MAT x10^3(ANC) 9.21 10*3/uL 1.99-6.95    H            



             (test code =                                        



             8828191193)                                         

 

             IMM GRAN x10^3 (test 0.04 10*3/uL 0-0.06                    



             code = 0393591027)                                        

 

             LYMPH x10^3 (test code 1.66 10*3/uL 1.09-3.23                 



             = 731-0)                                            

 

             MONO x10^3 (test code 0.54 10*3/uL 0.36-1.02                 



             = 742-7)                                            

 

             EOS x10^3 (test code = 0.06 10*3/uL 0.06-0.53                 



             711-2)                                              

 

             BASO x10^3 (test code 0.04 10*3/uL 0.01-0.09                 



             = 704-7)                                            

 

             Lab Interpretation Abnormal                               



             (test code = 10774-3)                                        



[facility] TESTICULAR TORSION2021-02-10 05:38:51 No 
evidence of testicular torsion. Normal spectral and color Dopplerfindings at 
both testicles. Left testicular microlithiasis. Simple 8 mm right epididymal 
head cyst. Nonspecific partially calcified 4 mm extratesticular nodularity 
adjacent tothe right testicle, likely benign. No suspicious testicular lesions. 
Preliminary Report Dictated by Resident: Ian Siu MD., 
have reviewed this study and agree with the abovereport.US TESTICULAR TORSION 
HISTORY: left testicular pain HadUS last week, was having right testicularpain, 
pain now worse . TECHNIQUE: Testicular ultrasound performed with grayscale, 
color, andspectral Doppler images obtained. COMPARISON: 2/3/2021 FINDINGS: RIGHT
TESTICLE: Normal size, shape, and echotexture without a focal lesion.The right 
testicle measures 4.0 x 1.8 x 2.8 cm (10.6 mL). LEFT TESTICLE: Normal size, 
shape, and echotexture without a focal lesion.Microlithiasis is redemonstrated. 
The left testicle measures 4.3 x 3.1 x2.7 cm (18.9 mL). A lobulated echogenic 
extratesticular nodularity adjacent to the righttestes on image #34 with 
posterior acoustic shadowing measures up to 4 mm,nonspecific but likely benign 
pathology. EPIDIDYMIDES: Unchanged 8 mm anechoic right epididymal head cyst. 
BLOOD FLOW: Bilateral arterial waveforms. The blood flow is grosslysymmetric. 
SCROTUM: No hydrocele. VARICOCELE: None. [facility], Radiant Results Inft User - 
2021 11:40 PM [facility] TESTICULAR TORSIONHISTORY: left testicular pain Had US
last week, was having right testicularpain, pain now worse .TECHNIQUE: 
Testicular ultrasound performed with grayscale, color, andspectral Doppler 
images obtained.COMPARISON: 2/3/2021FINDINGS: RIGHT TESTICLE: Normal size, 
shape, andechotexture without a focal lesion.The right testicle measures 4.0 x 
1.8 x 2.8 cm (10.6 mL). LEFT TESTICLE: Normal size, shape, and echotexture 
without a focal lesion.Microlithiasis is redemonstrated. The left testicle 
measures 4.3 x 3.1 x2.7 cm (18.9 mL). A lobulated echogenic extratesticular 
nodularity adjacent to the righttestes on image #34 with posterior acoustic 
shadowing measures up to 4 mm,nonspecific but likely benign pathology. 
EPIDIDYMIDES: Unchanged 8 mm anechoic right epididymal head cyst.BLOOD FLOW: 
Bilateral arterial waveforms. The blood flow is grosslysymmetric.SCROTUM: No 
hydrocele.VARICOCELE: None.IMPRESSIONNo evidence of testicular torsion. Normal 
spectral and color Dopplerfindings at both testicles. Left testicular 
microlithiasis.Simple 8 mm right epididymal head cyst.Nonspecific partially 
calcified 4 mm extratesticular nodularity adjacent tothe right testicle, likely 
benign.No suspicious testicular lesions.Preliminary Report Dictated by Resident:
Ian Hardy MD., have reviewed this study and agree with the
abovereport.[facility]





             Test Item    Value        Reference Range Interpretation Comments

 

             APPEARANCE (test code = Hazy         Clear        A            



             4243445677)                                         

 

             COLOR (test code = Yellow       Yellow                    



             5664503578)                                         

 

             PH (test code =              4.8-8.0                   



             9632601593)                                         

 

             SP GRAVITY (test code =              1.003-1.030               



             2567542650)                                         

 

             GLU U QUAL (test code = Normal       Normal                    



             6770322245)                                         

 

             BLOOD (test code = Negative     Negative                  



             0674531658)                                         

 

             KETONES (test code = Negative     Negative                  



             2626271184)                                         

 

             PROTEIN (test code = Negative     Negative                  



             2887-8)                                             

 

             UROBILIN (test code = Normal       Normal                    



             0262478039)                                         

 

             BILIRUBIN (test code = Negative     Negative                  



             7269848226)                                         

 

             NITRITE (test code = Negative     Negative                  



             4349142410)                                         

 

             LEUK ROSS (test code = Negative     Negative                  



             0873659080)                                         

 

             RBC/HPF (test code =              See_Comment                [Autom

ated message]



             3630909306)                                         The system [employer]



                                                                 generated this 

result



                                                                 transmitted ref

erence



                                                                 range: 0 - 3 HP

F. The



                                                                 reference range

 was



                                                                 not used to int

erpret



                                                                 this result as



                                                                 normal/abnormal

.

 

             WBC/HPF (test code =              See_Comment                [Autom

ated message]



             8961331229)                                         The system [employer]



                                                                 generated this 

result



                                                                 transmitted ref

erence



                                                                 range: 0 - 5 HP

F. The



                                                                 reference range

 was



                                                                 not used to int

erpret



                                                                 this result as



                                                                 normal/abnormal

.

 

             BACTERIA (test code = Few          Negative     A            



             3998277098)                                         

 

             AMORPHOUS (test code = Few          Rare HPF     A            



             4981965641)                                         

 

             Lab Interpretation (test Abnormal                               



             code = 14097-4)                                        



[facility] RAPID [facility]2021 
06:59:00





             Test Item    Value        Reference Range Interpretation Comments

 

             SARS-CoV-2 Rapid ID NOW Not Detected Not Detected              



             (test code = 66054-5)                                        

 

             RICKEY (test code = RICKEY) ID NOW COVID-19 Assay                        

   



                          is an isothermal                           



                          nucleic acid                           



                          amplification test                           



                          intended for the                           



                          qualitative detection                           



                          of nucleic acid from                           



                          SARS-CoV-2 viral RNA                           



                          in nasopharyngeal (NP)                           



                          specimens. It is used                           



                          under Emergency Use                           



                          Authorization (EUA) by                           



                          FDA. The limit of                           



                          detection (LOD) of the                           



                          assay is 125 Genome                           



                          Equivalents/mL. A                           



                          positive result is                           



                          indicative of the                           



                          presence of SARS-CoV-2                           



                          RNA. ?Clinical                           



                          correlation with                           



                          patient history and                           



                          other diagnostic                           



                          information is                           



                          necessary to determine                           



                          patient infection                           



                          status. A negative                           



                          (Not Detected) result                           



                          does not preclude                           



                          SARS-CoV-2 infection.                           



                          In patients with                           



                          clinical symptoms and                           



                          other tests that are                           



                          consistent with                           



                          SARS-CoV-2 infection,                           



                          negative results                           



                          should be treated as                           



                          presumptive negative                           



                          and a new specimen                           



                          should be tested with                           



                          alternative PCR                           



                          molecular test.                           



                          Invalid: Please                           



                          collect a new specimen                           



                          for repeat patient                           



                          testing if clinically                           



                          indicated.                             

 

             Lab Interpretation Normal                                 



             (test code = 71671-2)                                        



[facility] Metabolic Panel (NA, K, CL, CO2, 
GLUCOSE, BUN, CREATININE, CA)2021 06:55:00





             Test Item    Value        Reference Range Interpretation Comments

 

             NA (test code = 141 mmol/L   135-145                   



             [id number])                                         

 

             K (test code = 4.2 mmol/L   3.5-5                     



             5849928200)                                         

 

             CL (test code = 104 mmol/L                       



             1178647125)                                         

 

             CO2 TOTAL (test code = 26 mmol/L    23-31                     



             4193543344)                                         

 

             AGAP (test code =              2-16                      



             9120967250)                                         

 

             BUN (test code = 11 mg/dL     7-23                      



             1919557349)                                         

 

             GLUCOSE (test code = 135 mg/dL           H            



             7856704224)                                         

 

             CREATININE (test code = 0.53 mg/dL   0.6-1.25     L            



             2553476345)                                         

 

             CALCIUM (test code = 9.5 mg/dL    8.6-10.6                  



             6373476453)                                         

 

             eGFR Calculation              mL/min/1.73m2              



             (Non-)                                        



             (test code =                                        



             4896171889)                                         

 

             eGFR Calculation              mL/min/1.73m2              



             (African American)                                        



             (test code =                                        



             4358692384)                                         

 

             RICKEY (test code = RICKEY) Association of                           



                          Glomerular Filtration                           



                          Rate (GFR) and Staging                           



                          of Kidney Disease*                           



                          +---------------------                           



                          --+-------------------                           



                          --+-------------------                           



                          ------+| GFR                           



                          (mL/min/1.73 m2) ?|                           



                          With Kidney Damage ?|                           



                          ?Without Kidney                           



                          Damage+---------------                           



                          --------+-------------                           



                          --------+-------------                           



                          ------------+| ?>90 ?                           



                          ? ? ? ? ? ? ? ?|                           



                          ?Stage one ? ? ? ? ?|                           



                          ? Normal ? ? ? ? ? ? ?                           



                          ?+--------------------                           



                          ---+------------------                           



                          ---+------------------                           



                          -------+| ?60-89 ? ? ?                           



                          ? ? ? ? ?| ?Stage two                           



                          ? ? ? ? ?| ? Decreased                           



                          GFR ? ? ? ?                            



                          +---------------------                           



                          --+-------------------                           



                          --+-------------------                           



                          ------+| ?30-59 ? ? ?                           



                          ? ? ? ? ?| ?Stage                           



                          three ? ? ? ?| ? Stage                           



                          three ? ? ? ? ?                           



                          +---------------------                           



                          --+-------------------                           



                          --+-------------------                           



                          ------+| ?15-29 ? ? ?                           



                          ? ? ? ? ?| ?Stage four                           



                          ? ? ? ? | ? Stage four                           



                          ? ? ? ? ?                              



                          ?+--------------------                           



                          ---+------------------                           



                          ---+------------------                           



                          -------+| ?<15 (or                           



                          dialysis) ? ?| ?Stage                           



                          five ? ? ? ? | ? Stage                           



                          five ? ? ? ? ?                           



                          ?+--------------------                           



                          ---+------------------                           



                          ---+------------------                           



                          -------+ *Each stage                           



                          assumes the associated                           



                          GFR level has been in                           



                          effect for at least                           



                          three months. ?Stages                           



                          1 to 5, with or                           



                          without kidney                           



                          disease, indicate                           



                          chronic kidney                           



                          disease. Notes:                           



                          Determination of                           



                          stages one and two                           



                          (with eGFR                             



                          >59mL/min/1.73 m2)                           



                          requires estimation of                           



                          kidney damage for at                           



                          least three months as                           



                          defined by structural                           



                          or functional                           



                          abnormalities of the                           



                          kidney, manifested by                           



                          either:Pathological                           



                          abnormalities or                           



                          Markers of kidney                           



                          damage (including                           



                          abnormalities in the                           



                          composition of the                           



                          blood or urine or                           



                          abnormalities in                           



                          imaging tests).                           

 

             Lab Interpretation Abnormal                               



             (test code = 25832-4)                                        



[facility] (ALB, T.PRO, BILI T, 
BU/BC, ALT, AST, ALK PHOS)2021 06:55:00





             Test Item    Value        Reference Range Interpretation Comments

 

             TOTAL BILI (test code = 4488949718) 0.5 mg/dL    0.1-1.1           

        

 

             BILI UNCON (test code = 1796081121) 0.2 mg/dL    0.1-1.1           

        

 

             BILI CONJ (test code = 7438311979) 0.0 mg/dL    0-0.3              

       

 

             T PROTEIN (test code = 0731537555) 8.2 g/dL     6.3-8.2            

       

 

             ALBUMIN (test code = 7465355985) 4.8 g/dL     3.5-5                

     

 

             ALK PHOS (test code = 5244836468) 135 U/L             H      

      

 

             ALTv (test code = 1742-6) 51 U/L       5-50         H            

 

             AST(SGOT) (test code = 9590574212) 40 U/L       13-40              

       

 

             Lab Interpretation (test code = Abnormal                           

    



             90906-4)                                            



[facility]





             Test Item    Value        Reference Range Interpretation Comments

 

             APTT Patient (test              See_Comment                [Automat

ed



             code = 3173-2)                                        message] The



                                                                 system which



                                                                 generated this



                                                                 result



                                                                 transmitted



                                                                 reference range

:



                                                                 23 - 38 Seconds

.



                                                                 The reference



                                                                 range was not



                                                                 used to interpr

et



                                                                 this result as



                                                                 normal/abnormal

.

 

             RICKEY (test code = RICKEY) The [facility] patient                           



                          population mean                           



                          normal value for                           



                          aPTT is 30                             



                          seconds.                               

 

             Lab Interpretation Normal                                 



             (test code = 06123-8)                                        



[facility] / [id number] 06:51:00





             Test Item    Value        Reference Range Interpretation Comments

 

             PROTIME PATIENT (test              See_Comment                [Auto

mated message]



             code = 5964-2)                                        The system wh

ich



                                                                 generated this 

result



                                                                 transmitted ref

erence



                                                                 range: 12.0 - 1

4.7



                                                                 Seconds. The re

ference



                                                                 range was not u

sed to



                                                                 interpret this 

result



                                                                 as normal/abnor

mal.

 

             INR (test code = 6301-6)                                        Nor

mal INR <1.1;



                                                                 Warfarin Therap

eutic



                                                                 range 2.0 to 3.

0 or



                                                                 2.5 to 3.5, dep

ending



                                                                 upon the indica

tions.

 

             Lab Interpretation (test Normal                                 



             code = 80945-2)                                        



[facility] [id number] 06:44:00





             Test Item    Value        Reference Range Interpretation Comments

 

             WBC (test code =              See_Comment                [Automated



             3190-2)                                             message] The sy

stem



                                                                 which generated



                                                                 this result



                                                                 transmitted



                                                                 reference range

:



                                                                 4.20 - 10.70



                                                                 10*3/?L. The



                                                                 reference range

 was



                                                                 not used to



                                                                 interpret this



                                                                 result as



                                                                 normal/abnormal

.

 

             RBC (test code =              See_Comment                [Automated



             259-8)                                              message] The sy

stem



                                                                 which generated



                                                                 this result



                                                                 transmitted



                                                                 reference range

:



                                                                 4.26 - 5.52



                                                                 10*6/?L. The



                                                                 reference range

 was



                                                                 not used to



                                                                 interpret this



                                                                 result as



                                                                 normal/abnormal

.

 

             HGB (test code = 15.0 g/dL    12.2-16.4                 



             718-7)                                              

 

             HCT (test code = 44.6 %       38.4-49.3                 



             4544-3)                                             

 

             MCV (test code = 85.0 fL      81.7-95.6                 



             787-2)                                              

 

             MCH (test code = 28.6 pg      26.1-32.7                 



             785-6)                                              

 

             MCHC (test code = 33.6 g/dL    31.2-35                   



             786-4)                                              

 

             RDW-SD (test code = 37.9 fL      38.5-51.6    L            



             73869-8)                                            

 

             RDW-CV (test code = 12.3 %       12.1-15.4                 



             788-0)                                              

 

             PLT (test code =              See_Comment  H             [Automated



             777-3)                                              message] The sy

stem



                                                                 which generated



                                                                 this result



                                                                 transmitted



                                                                 reference range

:



                                                                 150 - 328 10*3/

?L.



                                                                 The reference r

horacio



                                                                 was not used to



                                                                 interpret this



                                                                 result as



                                                                 normal/abnormal

.

 

             MPV (test code = 8.9 fL       9.8-13       L            



             71740-1)                                            

 

             NRBC/100 WBC (test              See_Comment                [Automat

ed



             code = 2079064922)                                        message] 

The system



                                                                 which generated



                                                                 this result



                                                                 transmitted



                                                                 reference range

:



                                                                 0.0 - 10.0 /100



                                                                 WBCs. The refer

ence



                                                                 range was not u

sed



                                                                 to interpret th

is



                                                                 result as



                                                                 normal/abnormal

.

 

             NRBC x10^3 (test code <0.01        See_Comment                [Auto

mated



             = 6993236913)                                        message] The s

ystem



                                                                 which generated



                                                                 this result



                                                                 transmitted



                                                                 reference range

:



                                                                 10*3/?L. The



                                                                 reference range

 was



                                                                 not used to



                                                                 interpret this



                                                                 result as



                                                                 normal/abnormal

.

 

             GRAN MAT (NEUT) % 53.1 %                                 



             (test code = 770-8)                                        

 

             IMM GRAN % (test code 0.50 %                                 



             = 0604777908)                                        

 

             LYMPH % (test code = 36.1 %                                 



             736-9)                                              

 

             MONO % (test code = 7.6 %                                  



             5905-5)                                             

 

             EOS % (test code = 2.3 %                                  



             713-8)                                              

 

             BASO % (test code = 0.4 %                                  



             706-2)                                              

 

             GRAN MAT x10^3(ANC) 4.14 10*3/uL 1.99-6.95                 



             (test code =                                        



             5589651422)                                         

 

             IMM GRAN x10^3 (test 0.04 10*3/uL 0-0.06                    



             code = 6336081323)                                        

 

             LYMPH x10^3 (test code 2.81 10*3/uL 1.09-3.23                 



             = 731-0)                                            

 

             MONO x10^3 (test code 0.59 10*3/uL 0.36-1.02                 



             = 742-7)                                            

 

             EOS x10^3 (test code = 0.18 10*3/uL 0.06-0.53                 



             711-2)                                              

 

             BASO x10^3 (test code 0.03 10*3/uL 0.01-0.09                 



             = 704-7)                                            

 

             Lab Interpretation Abnormal                               



             (test code = 31120-9)                                        



[facility]





             Test Item    Value        Reference Range Interpretation Comments

 

             APPEARANCE (test code = Hazy         Clear        A            



             0313074449)                                         

 

             COLOR (test code = Yellow       Yellow                    



             4950771494)                                         

 

             PH (test code =              4.8-8.0                   



             7655977029)                                         

 

             SP GRAVITY (test code =              1.003-1.030               



             0815895630)                                         

 

             GLU U QUAL (test code = Normal       Normal                    



             5384618934)                                         

 

             BLOOD (test code = Negative     Negative                  



             9387143803)                                         

 

             KETONES (test code = Negative     Negative                  



             7933358945)                                         

 

             PROTEIN (test code = Negative     Negative                  



             2887-8)                                             

 

             UROBILIN (test code = 2.0 mg/dL    Normal       A            



             9553922507)                                         

 

             BILIRUBIN (test code = Negative     Negative                  



             5344142430)                                         

 

             NITRITE (test code = Negative     Negative                  



             7069572577)                                         

 

             LEUK ROSS (test code = Negative     Negative                  



             3401811750)                                         

 

             RBC/HPF (test code =              See_Comment                [Autom

ated message]



             9603225640)                                         The system [employer]



                                                                 generated this



                                                                 result transmit

[place]



                                                                 reference range

: 0 -



                                                                 3 HPF. The refe

rence



                                                                 range was not u

sed



                                                                 to interpret th

is



                                                                 result as



                                                                 normal/abnormal

.

 

             WBC/HPF (test code =              See_Comment                [Autom

ated message]



             9754959150)                                         The system [employer]



                                                                 generated this



                                                                 result transmit

[place]



                                                                 reference range

: 0 -



                                                                 5 HPF. The refe

rence



                                                                 range was not u

sed



                                                                 to interpret th

is



                                                                 result as



                                                                 normal/abnormal

.

 

             BACTERIA (test code = Negative     Negative                  



             0587391051)                                         

 

             MUCOUS (test code = Slight       Negative LPF A            



             1585464366)                                         

 

             AMORPHOUS (test code = Rare         Rare HPF                  



             0457438855)                                         

 

             Lab Interpretation (test Abnormal                               



             code = 80732-5)                                        



[facility] (NA, K, CL, CO2, 
GLUCOSE, BUN, CREATININE, CA)2020 12:37:00





             Test Item    Value        Reference Range Interpretation Comments

 

             NA (test code = 136 mmol/L   135-145                   



             0159165700)                                         

 

             K (test code = 4.0 mmol/L   3.5-5                     



             7402467635)                                         

 

             CL (test code = 106 mmol/L                       



             0403574778)                                         

 

             CO2 TOTAL (test code = 23 mmol/L    23-31                     



             9851752428)                                         

 

             AGAP (test code =              2-16                      



             7877069207)                                         

 

             BUN (test code = 10 mg/dL     7-23                      



             0116806308)                                         

 

             GLUCOSE (test code = 122 mg/dL           H            



             6344716218)                                         

 

             CREATININE (test code = 0.61 mg/dL   0.6-1.25                  



             6177812700)                                         

 

             CALCIUM (test code = 9.2 mg/dL    8.6-10.6                  



             0202507506)                                         

 

             eGFR Calculation              mL/min/1.73m2              



             (Non-)                                        



             (test code =                                        



             8494855218)                                         

 

             eGFR Calculation              mL/min/1.73m2              



             (African American)                                        



             (test code =                                        



             7592173446)                                         

 

             RICKEY (test code = RICKEY) Association of                           



                          Glomerular Filtration                           



                          Rate (GFR) and Staging                           



                          of Kidney Disease*                           



                          +---------------------                           



                          --+-------------------                           



                          --+-------------------                           



                          ------+| GFR                           



                          (mL/min/1.73 m2) ?|                           



                          With Kidney Damage ?|                           



                          ?Without Kidney                           



                          Damage+---------------                           



                          --------+-------------                           



                          --------+-------------                           



                          ------------+| ?>90 ?                           



                          ? ? ? ? ? ? ? ?|                           



                          ?Stage one ? ? ? ? ?|                           



                          ? Normal ? ? ? ? ? ? ?                           



                          ?+--------------------                           



                          ---+------------------                           



                          ---+------------------                           



                          -------+| ?60-89 ? ? ?                           



                          ? ? ? ? ?| ?Stage two                           



                          ? ? ? ? ?| ? Decreased                           



                          GFR ? ? ? ?                            



                          +---------------------                           



                          --+-------------------                           



                          --+-------------------                           



                          ------+| ?30-59 ? ? ?                           



                          ? ? ? ? ?| ?Stage                           



                          three ? ? ? ?| ? Stage                           



                          three ? ? ? ? ?                           



                          +---------------------                           



                          --+-------------------                           



                          --+-------------------                           



                          ------+| ?15-29 ? ? ?                           



                          ? ? ? ? ?| ?Stage four                           



                          ? ? ? ? | ? Stage four                           



                          ? ? ? ? ?                              



                          ?+--------------------                           



                          ---+------------------                           



                          ---+------------------                           



                          -------+| ?<15 (or                           



                          dialysis) ? ?| ?Stage                           



                          five ? ? ? ? | ? Stage                           



                          five ? ? ? ? ?                           



                          ?+--------------------                           



                          ---+------------------                           



                          ---+------------------                           



                          -------+ *Each stage                           



                          assumes the associated                           



                          GFR level has been in                           



                          effect for at least                           



                          three months. ?Stages                           



                          1 to 5, with or                           



                          without kidney                           



                          disease, indicate                           



                          chronic kidney                           



                          disease. Notes:                           



                          Determination of                           



                          stages one and two                           



                          (with eGFR                             



                          >59mL/min/1.73 m2)                           



                          requires estimation of                           



                          kidney damage for at                           



                          least three months as                           



                          defined by structural                           



                          or functional                           



                          abnormalities of the                           



                          kidney, manifested by                           



                          either:Pathological                           



                          abnormalities or                           



                          Markers of kidney                           



                          damage (including                           



                          abnormalities in the                           



                          composition of the                           



                          blood or urine or                           



                          abnormalities in                           



                          imaging tests).                           

 

             Lab Interpretation Abnormal                               



             (test code = 77530-5)                                        



[facility] with [id number] 12:27:00





             Test Item    Value        Reference Range Interpretation Comments

 

             WBC (test code =              See_Comment                [Automated



             2490-2)                                             message] The



                                                                 system which



                                                                 generated this



                                                                 result transmit

[place]



                                                                 reference range

:



                                                                 4.50 - 13.50



                                                                 10*3/?L. The



                                                                 reference range



                                                                 was not used to



                                                                 interpret this



                                                                 result as



                                                                 normal/abnormal

.

 

             RBC (test code =              See_Comment                [Automated



             789-8)                                              message] The



                                                                 system which



                                                                 generated this



                                                                 result transmit

[place]



                                                                 reference range

:



                                                                 4.50 - 5.30



                                                                 10*6/?L. The



                                                                 reference range



                                                                 was not used to



                                                                 interpret this



                                                                 result as



                                                                 normal/abnormal

.

 

             HGB (test code = 14.2 g/dL    13-16                     



             718-7)                                              

 

             HCT (test code = 42.1 %       37-49                     



             4544-3)                                             

 

             MCV (test code = 85.9 fL      78-95                     



             787-2)                                              

 

             MCH (test code = 29.0 pg      26-32                     



             785-6)                                              

 

             MCHC (test code = 33.7 g/dL    32-36                     



             786-4)                                              

 

             RDW-SD (test code = 38.9 fL      38.5-49                   



             67740-2)                                            

 

             RDW-CV (test code = 12.4 %       11.5-14                   



             788-0)                                              

 

             PLT (test code =              See_Comment                [Automated



             777-3)                                              message] The



                                                                 system which



                                                                 generated this



                                                                 result transmit

[place]



                                                                 reference range

:



                                                                 133 - 320 10*3/

?L.



                                                                 The reference



                                                                 range was not u

sed



                                                                 to interpret th

is



                                                                 result as



                                                                 normal/abnormal

.

 

             MPV (test code = 9.6 fL       9.3-12.9                  



             58052-7)                                            

 

             NRBC/100 WBC (test              See_Comment                [Automat

ed



             code = 1213283807)                                        message] 

The



                                                                 system which



                                                                 generated this



                                                                 result transmit

[place]



                                                                 reference range

:



                                                                 0.0 - 10.0 /100



                                                                 WBCs. The



                                                                 reference range



                                                                 was not used to



                                                                 interpret this



                                                                 result as



                                                                 normal/abnormal

.

 

             NRBC x10^3 (test code <0.01        See_Comment                [Auto

mated



             = 4135292773)                                        message] The



                                                                 system which



                                                                 generated this



                                                                 result transmit

[place]



                                                                 reference range

:



                                                                 10*3/?L. The



                                                                 reference range



                                                                 was not used to



                                                                 interpret this



                                                                 result as



                                                                 normal/abnormal

.

 

             GRAN MAT (NEUT) % 81.7 %                                 



             (test code = 770-8)                                        

 

             IMM GRAN % (test code 0.50 %                                 



             = 5379619944)                                        

 

             LYMPH % (test code = 11.6 %                                 



             736-9)                                              

 

             MONO % (test code = 6.0 %                                  



             5905-5)                                             

 

             EOS % (test code = 0.0 %                                  



             713-8)                                              

 

             BASO % (test code = 0.2 %                                  



             706-2)                                              

 

             GRAN MAT x10^3(ANC) 10.61 10*3/uL 1.5-10.3     H            



             (test code =                                        



             2089109510)                                         

 

             IMM GRAN x10^3 (test 0.06 10*3/uL 0-0.06                    



             code = 4292959110)                                        

 

             LYMPH x10^3 (test code 1.51 10*3/uL 0.7-7.4                   



             = 731-0)                                            

 

             MONO x10^3 (test code 0.78 10*3/uL 0-0.5        H            



             = 742-7)                                            

 

             EOS x10^3 (test code = <0.03        0-0.4                     



             711-2)                                              

 

             BASO x10^3 (test code <0.03        0-0.1                     



             = 704-7)                                            

 

             Lab Interpretation Abnormal                               



             (test code = 30771-7)                                        



[facility] [id number] 
04:43:28 1. ?Fluid-filled, thickened and mildly dilated appendix with 
minimaladjacent fat stranding, consistent with early acute appendicitis. 
Noevidence of perforation or organized collection identified. Findings were 
conveyed to Dr. Stubbs at 10:05 PM on 2020. Preliminary Report Dictated by 
Resident: Cinthia Lincoln ?MD Phillip., have reviewed this study and 
agree with the abovereport. EXAM: CTABDOMEN AND PELVIS WITH CONTRAST HISTORY: 
19-year-old male complaints of right lower quadrant pain. COMPARISON: None. 
TECHNIQUE AND FINDINGS: Contiguous axial imaging from the level of the lungbases
through the pubic symphysis was performed after the uncomplicatedadministration 
of 120 cc of intravenous Omnipaque contrast. Coronal andsagittal reconstructions
were obtained. ?Auto mA and/or iterativereconstruction were used to reduce 
radiation dose. FINDINGS: LOWER THORAX: The lungs bases are clear. No
cardiomegaly. LIVER: No focal hepatic lesions. ?Normal contour. GALLBLADDER AND 
BILIARY TREE: No biliary ductal dilation. The gallbladderis physiologically 
distended. No gallbladder wall thickening. SPLEEN: No splenomegaly. PANCREAS: No
ductal dilation or masses. ADRENAL GLANDS: No adrenal nodules. KIDNEYS: No 
hydronephrosis, stones or masses are identified. PERITONEUM AND RETROPERITONEUM:
No free air or fluid. LYMPH NODES: Scattered bilateral inguinal lymph nodes, 
measuring up to 1cm, possibly reactive. GI TRACT: The appendix is dilated up to 
0.8 cm and is fluid-filled withthickened enhancing walland minimal adjacent fat 
stranding. Noperiappendiceal free fluid, air or organized when compared No b
oweldilation or wall thickening. PELVIS/BLADDER: The bladder is decompressed. 
VESSELS: Unremarkable.BONES AND SOFT TISSUES: No suspicious lytic or sclerotic 
bony lesions. [facility], Radiant Results In[facility] User - 2020 11:44 PM CDTEXAM: CT 
ABDOMEN AND PELVIS WITH CONTRASTHISTORY: 19-year-old male complaints of right 
lower quadrant pain.COMPARISON: None.TECHNIQUE AND FINDINGS: Contiguous axial 
imaging from the level of the lungbases through the pubic symphysis was 
performed after the uncomplicatedadministration of 120 cc of intravenous 
Omnipaque contrast. Coronal andsagittal reconstructions were obtained. Auto mA 
and/or iterativereconstruction were used to reduce radiation dose.FINDINGS:LOWER
THORAX:The lungs bases are clear. No cardiomegaly.LIVER: No focal hepatic 
lesions. Normal contour.GALLBLADDER AND BILIARY TREE: No biliary ductal 
dilation. The gallbladderis physiologically distended. No gallbladder wall 
thickening.SPLEEN: No splenomegaly.PANCREAS: No ductal dilation or 
masses.ADRENAL GLANDS: No adrenal nodules.KIDNEYS: No hydronephrosis, stones or 
masses are identified.PERITONEUM AND RETROPERITONEUM: No free air or fluid.LYMPH
NODES: Scattered bilateral inguinal lymph nodes, measuring up to 1cm, possibly 
reactive.GI TRACT: The appendix is dilated up to 0.8 cm and is fluid-filled 
withthickened enhancing wall and minimal adjacent fat stranding. 
Noperiappendiceal free fluid, air or organized when compared No boweldilation or
wall thickening.PELVIS/BLADDER: The bladder is decompressed.VESSELS: 
Unremarkable.BONES AND SOFT TISSUES: No suspicious lytic or sclerotic bony 
lesions.IMPRESSION1. Fluid-filled, thickened and mildly dilated appendix with 
minimaladjacent fat stranding, consistent with early acute appendicitis. 
Noevidence of perforation or organized collection identified.Findings were 
conveyed to Dr. Stubbs at 10:05 PM on 2020.Preliminary Report Dictated by 
Resident: Alexis Null, Cinthia Fisher MD., have reviewed this study and agree
with the abovereport.[facility]2020 03:32:00





             Test Item    Value        Reference Range Interpretation Comments

 

             SARS-CoV-2 Rapid ID NOW Not Detected Not Detected              



             (test code = 46182-6)                                        

 

             RICKEY (test code = RICKEY) ID NOW COVID-19 Assay                        

   



                          is an isothermal                           



                          nucleic acid                           



                          amplification test                           



                          intended for the                           



                          qualitative detection                           



                          of nucleic acid from                           



                          SARS-CoV-2 viral RNA                           



                          in nasopharyngeal (NP)                           



                          specimens. It is used                           



                          under Emergency Use                           



                          Authorization (EUA) by                           



                          FDA. The limit of                           



                          detection (LOD) of the                           



                          assay is 125 Genome                           



                          Equivalents/mL. A                           



                          positive result is                           



                          indicative of the                           



                          presence of SARS-CoV-2                           



                          RNA. ?Clinical                           



                          correlation with                           



                          patient history and                           



                          other diagnostic                           



                          information is                           



                          necessary to determine                           



                          patient infection                           



                          status. A negative                           



                          (Not Detected) result                           



                          does not preclude                           



                          SARS-CoV-2 infection.                           



                          In patients with                           



                          clinical symptoms and                           



                          other tests that are                           



                          consistent with                           



                          SARS-CoV-2 infection,                           



                          negative results                           



                          should be treated as                           



                          presumptive negative                           



                          and a new specimen                           



                          should be tested with                           



                          alternative PCR                           



                          molecular test.                           



                          Invalid: Please                           



                          collect a new specimen                           



                          for repeat patient                           



                          testing if clinically                           



                          indicated.                             

 

             Lab Interpretation Normal                                 



             (test code = 97422-0)                                        



[facility]





             Test Item    Value        Reference Range Interpretation Comments

 

             LIPASE (test code = 8443211165) 15 U/L       0-220                 

    

 

             Lab Interpretation (test code = Normal                             

    



             59687-2)                                            



[facility] (ALB, T.PRO, BILI T, 
BU/BC, ALT, AST, ALK PHOS)2020 02:34:00





             Test Item    Value        Reference Range Interpretation Comments

 

             TOTAL BILI (test code = 3674510216) 0.7 mg/dL    0.1-1.1           

        

 

             BILI UNCON (test code = 8188161662) 0.8 mg/dL    0.1-1.1           

        

 

             BILI CONJ (test code = 7311169624) 0.0 mg/dL    0-0.3              

       

 

             T PROTEIN (test code = 5147359291) 8.8 g/dL     6.3-8.2      H     

       

 

             ALBUMIN (test code = 3069834896) 5.2 g/dL     3.5-5        H       

     

 

             ALK PHOS (test code = 8031635274) 91 U/L                     

      

 

             ALTv (test code = 1742-6) 36 U/L       5-50                      

 

             AST(SGOT) (test code = 8936384805) 30 U/L       13-40              

       

 

             Lab Interpretation (test code = Abnormal                           

    



             32794-6)                                            



[facility]





             Test Item    Value        Reference Range Interpretation Comments

 

             APPEARANCE (test code = Clear        Clear                     



             7785740530)                                         

 

             COLOR (test code = Yellow       Yellow                    



             0744684298)                                         

 

             PH (test code =              4.8-8.0                   



             9438586776)                                         

 

             SP GRAVITY (test code =              1.003-1.030  H            



             0750488539)                                         

 

             GLU U QUAL (test code = Normal       Normal                    



             2286874825)                                         

 

             BLOOD (test code = Negative     Negative                  



             1811853601)                                         

 

             KETONES (test code = 80 mg/dL     Negative     A            



             9372595332)                                         

 

             PROTEIN (test code = 30 mg/dL     Negative     A            



             2887-8)                                             

 

             UROBILIN (test code = Normal       Normal                    



             9318433070)                                         

 

             BILIRUBIN (test code = Negative     Negative                  



             3772035551)                                         

 

             NITRITE (test code = Negative     Negative                  



             8321464487)                                         

 

             LEUK ROSS (test code = Negative     Negative                  



             5790461983)                                         

 

             RBC/HPF (test code =              See_Comment                [Autom

ated message]



             0883392547)                                         The system [employer]



                                                                 generated this 

result



                                                                 transmitted ref

erence



                                                                 range: 0 - 3 HP

F. The



                                                                 reference range

 was



                                                                 not used to int

erpret



                                                                 this result as



                                                                 normal/abnormal

.

 

             WBC/HPF (test code = <1           See_Comment                [Autom

ated message]



             9870764036)                                         The system [employer]



                                                                 generated this 

result



                                                                 transmitted ref

erence



                                                                 range: 0 - 5 HP

F. The



                                                                 reference range

 was



                                                                 not used to int

erpret



                                                                 this result as



                                                                 normal/abnormal

.

 

             BACTERIA (test code = Negative     Negative                  



             3584659894)                                         

 

             MUCOUS (test code = Moderate     Negative LPF A            



             7641287088)                                         

 

             HYAL CAST (test code =              See_Comment                [Aut

omated message]



             6545479562)                                         The system [employer]



                                                                 generated this 

result



                                                                 transmitted ref

erence



                                                                 range: <=2 LPF.

 The



                                                                 reference range

 was



                                                                 not used to int

erpret



                                                                 this result as



                                                                 normal/abnormal

.

 

             Lab Interpretation (test Abnormal                               



             code = 18582-5)                                        



[facility] (NA, K, CL, CO2, 
GLUCOSE, BUN, CREATININE, CA)2020 02:17:00





             Test Item    Value        Reference Range Interpretation Comments

 

             NA (test code = 140 mmol/L   135-145                   



             5296157155)                                         

 

             K (test code = 3.9 mmol/L   3.5-5                     



             9734137152)                                         

 

             CL (test code = 102 mmol/L                       



             5195553616)                                         

 

             CO2 TOTAL (test code = 24 mmol/L    23-31                     



             4233630377)                                         

 

             AGAP (test code =              2-16                      



             8879247730)                                         

 

             BUN (test code = 13 mg/dL     7-23                      



             9132259276)                                         

 

             GLUCOSE (test code = 116 mg/dL           H            



             4850749175)                                         

 

             CREATININE (test code = 0.68 mg/dL   0.6-1.25                  



             9835892589)                                         

 

             CALCIUM (test code = 9.9 mg/dL    8.6-10.6                  



             5002397762)                                         

 

             eGFR Calculation              mL/min/1.73m2              



             (Non-)                                        



             (test code =                                        



             9671809210)                                         

 

             eGFR Calculation              mL/min/1.73m2              



             (African American)                                        



             (test code =                                        



             9577989426)                                         

 

             RICKEY (test code = RICKEY) Association of                           



                          Glomerular Filtration                           



                          Rate (GFR) and Staging                           



                          of Kidney Disease*                           



                          +---------------------                           



                          --+-------------------                           



                          --+-------------------                           



                          ------+| GFR                           



                          (mL/min/1.73 m2) ?|                           



                          With Kidney Damage ?|                           



                          ?Without Kidney                           



                          Damage+---------------                           



                          --------+-------------                           



                          --------+-------------                           



                          ------------+| ?>90 ?                           



                          ? ? ? ? ? ? ? ?|                           



                          ?Stage one ? ? ? ? ?|                           



                          ? Normal ? ? ? ? ? ? ?                           



                          ?+--------------------                           



                          ---+------------------                           



                          ---+------------------                           



                          -------+| ?60-89 ? ? ?                           



                          ? ? ? ? ?| ?Stage two                           



                          ? ? ? ? ?| ? Decreased                           



                          GFR ? ? ? ?                            



                          +---------------------                           



                          --+-------------------                           



                          --+-------------------                           



                          ------+| ?30-59 ? ? ?                           



                          ? ? ? ? ?| ?Stage                           



                          three ? ? ? ?| ? Stage                           



                          three ? ? ? ? ?                           



                          +---------------------                           



                          --+-------------------                           



                          --+-------------------                           



                          ------+| ?15-29 ? ? ?                           



                          ? ? ? ? ?| ?Stage four                           



                          ? ? ? ? | ? Stage four                           



                          ? ? ? ? ?                              



                          ?+--------------------                           



                          ---+------------------                           



                          ---+------------------                           



                          -------+| ?<15 (or                           



                          dialysis) ? ?| ?Stage                           



                          five ? ? ? ? | ? Stage                           



                          five ? ? ? ? ?                           



                          ?+--------------------                           



                          ---+------------------                           



                          ---+------------------                           



                          -------+ *Each stage                           



                          assumes the associated                           



                          GFR level has been in                           



                          effect for at least                           



                          three months. ?Stages                           



                          1 to 5, with or                           



                          without kidney                           



                          disease, indicate                           



                          chronic kidney                           



                          disease. Notes:                           



                          Determination of                           



                          stages one and two                           



                          (with eGFR                             



                          >59mL/min/1.73 m2)                           



                          requires estimation of                           



                          kidney damage for at                           



                          least three months as                           



                          defined by structural                           



                          or functional                           



                          abnormalities of the                           



                          kidney, manifested by                           



                          either:Pathological                           



                          abnormalities or                           



                          Markers of kidney                           



                          damage (including                           



                          abnormalities in the                           



                          composition of the                           



                          blood or urine or                           



                          abnormalities in                           



                          imaging tests).                           

 

             Lab Interpretation Abnormal                               



             (test code = 55599-9)                                        



[facility] with Wdqcdtepsmvc0846-07-20 01:41:00





             Test Item    Value        Reference Range Interpretation Comments

 

             WBC (test code =              See_Comment  H             [Automated



             2990-2)                                             message] The



                                                                 system which



                                                                 generated this



                                                                 result transmit

[place]



                                                                 reference range

:



                                                                 4.50 - 13.50



                                                                 10*3/?L. The



                                                                 reference range



                                                                 was not used to



                                                                 interpret this



                                                                 result as



                                                                 normal/abnormal

.

 

             RBC (test code =              See_Comment                [Automated



             789-8)                                              message] The



                                                                 system which



                                                                 generated this



                                                                 result transmit

[place]



                                                                 reference range

:



                                                                 4.50 - 5.30



                                                                 10*6/?L. The



                                                                 reference range



                                                                 was not used to



                                                                 interpret this



                                                                 result as



                                                                 normal/abnormal

.

 

             HGB (test code = 15.4 g/dL    13-16                     



             718-7)                                              

 

             HCT (test code = 44.8 %       37-49                     



             4544-3)                                             

 

             MCV (test code = 84.5 fL      78-95                     



             787-2)                                              

 

             MCH (test code = 29.1 pg      26-32                     



             785-6)                                              

 

             MCHC (test code = 34.4 g/dL    32-36                     



             786-4)                                              

 

             RDW-SD (test code = 37.9 fL      38.5-49      L            



             80241-2)                                            

 

             RDW-CV (test code = 12.4 %       11.5-14                   



             788-0)                                              

 

             PLT (test code =              See_Comment  H             [Automated



             777-3)                                              message] The



                                                                 system which



                                                                 generated this



                                                                 result transmit

[place]



                                                                 reference range

:



                                                                 133 - 320 10*3/

?L.



                                                                 The reference



                                                                 range was not u

sed



                                                                 to interpret th

is



                                                                 result as



                                                                 normal/abnormal

.

 

             MPV (test code = 9.3 fL       9.3-12.9                  



             61716-8)                                            

 

             NRBC/100 WBC (test              See_Comment                [Automat

ed



             code = 2613059688)                                        message] 

The



                                                                 system which



                                                                 generated this



                                                                 result transmit

[place]



                                                                 reference range

:



                                                                 0.0 - 10.0 /100



                                                                 WBCs. The



                                                                 reference range



                                                                 was not used to



                                                                 interpret this



                                                                 result as



                                                                 normal/abnormal

.

 

             NRBC x10^3 (test code <0.01        See_Comment                [Auto

mated



             = [id number])                                        message] The



                                                                 system which



                                                                 generated this



                                                                 result transmit

[place]



                                                                 reference range

:



                                                                 10*3/?L. The



                                                                 reference range



                                                                 was not used to



                                                                 interpret this



                                                                 result as



                                                                 normal/abnormal

.

 

             GRAN MAT (NEUT) % 85.5 %                                 



             (test code = 770-8)                                        

 

             IMM GRAN % (test code 0.50 %                                 



             = [id number])                                        

 

             LYMPH % (test code = 7.9 %                                  



             736-9)                                              

 

             MONO % (test code = 5.8 %                                  



             5905-5)                                             

 

             EOS % (test code = 0.1 %                                  



             713-8)                                              

 

             BASO % (test code = 0.2 %                                  



             706-2)                                              

 

             GRAN MAT x10^3(ANC) 12.93 10*3/uL 1.5-10.3     H            



             (test code =                                        



             [id number])                                         

 

             IMM GRAN x10^3 (test 0.08 10*3/uL 0-0.06       H            



             code = [id number])                                        

 

             LYMPH x10^3 (test code 1.19 10*3/uL 0.7-7.4                   



             = 731-0)                                            

 

             MONO x10^3 (test code 0.87 10*3/uL 0-0.5        H            



             = 742-7)                                            

 

             EOS x10^3 (test code = <0.03        0-0.4                     



             711-2)                                              

 

             BASO x10^3 (test code 0.03 10*3/uL 0-0.1                     



             = 704-7)                                            

 

             Lab Interpretation Abnormal                               



             (test code = 96708-3)                                        



[facility]

## 2023-03-22 ENCOUNTER — HOSPITAL ENCOUNTER (EMERGENCY)
Dept: HOSPITAL 97 - ER | Age: 23
Discharge: HOME | End: 2023-03-22
Payer: SELF-PAY

## 2023-03-22 DIAGNOSIS — J02.9: Primary | ICD-10-CM

## 2023-03-22 NOTE — XMS REPORT
Continuity of Care Document

                            Created on:2023



Patient:BRODY HOBBS

Sex:Male

:2000

External Reference #:[id number]





Demographics







                          Address                   [address] 



                                                    [place] [address]

 

                          Home Phone                [phone]

 

                          Work Phone                [phone]

 

                          Mobile Phone              [phone]

 

                          Email Address             [email or web address]

 

                          Preferred Language        English

 

                          Marital Status            Unknown

 

                          Faith Affiliation     Unknown

 

                          Race                      Unknown

 

                          Additional Race(s)        White

 

                          Ethnic Group              Unknown









Author







                          Organization              [facility]

t

 

                          Address                   [address]



                                                    [place] [address]

 

                          Phone                     [phone]









Support







                Name            Relationship    Address         Phone

 

                Vanessa Temple  Sibling         Unavailable     [phone]

 

                KnightSaranya french    Significant Other [address]   [phone]

0



                                                [place] [address] 









Care Team Providers







                    Name                Role                Phone

 

                    Pcp, Patient Does Not Have A Primary Care Physician [phone]



 

                    Radha Doran MD  Attending Clinician [phone]

 

                    ZACH MEDRANO     Attending Clinician Unavailable

 

                    Doctor Unassigned, No Name Attending Clinician Unavailable

 

                    [facility]   Attending Clinician Unavailable

 

                    Brandi Kemp MD Attending Clinician [phone]

 

                    BRANDI KEMP   Attending Clinician Unavailable

 

                    [facility], [facility]            Attending Clinician Unavailable

 

                    Zach Medrano MD  Attending Clinician [phone]

 

                    MARCO A COYNE Attending Clinician Unavailable

 

                    Marco A Coyne MD Attending Clinician [phone]

 

                    Bhanu White MD   Attending Clinician [phone]

 

                    KATERINE STUBBS    Attending Clinician Unavailable

 

                    Katerine Stubbs MD Attending Clinician [phone]

 

                    Elena Cole Attending Clinician [phone]

 

                    Doris Agudelo Attending Clinician [phone]

 

                    DORIS ERAZO   Attending Clinician Unavailable

 

                    Ifeanyi Cotter MD  Attending Clinician [phone]

 

                    JANET DIXON    Attending Clinician Unavailable

 

                    Donita Hardwick Attending Clinician [phone]

 

                    DONITA GTZ     Attending Clinician Unavailable

 

                    Liberty Patterson   Attending Clinician [phone]

 

                    David Saenz Attending Clinician [phone]

 

                    Tangela Ly MD   Attending Clinician [phone]

 

                    DAVID LLOYD    Attending Clinician Unavailable

 

                    MORRICAL, MARCO A TEAGUE Admitting Clinician Unavailable

 

                    KATERINE STUBBS    Admitting Clinician Unavailable

 

                    Tangela Ly MD   Admitting Clinician [phone]









Problems







       Condition Condition Condition Status Onset  Resolution Last   Treating Co

mments 

Source



       Name   Details Category        Date   Date   Treatment Clinician        



                                                 Date                 

 

       Obesity Obesity Disease Active                              [facility]



       (BMI   (BMI                 8-07                               ity of



       30-39.9) 30-39.9)               00:00:                             [address]

 

       Acute  Acute  Disease Active                              [facility]



       appendicit appendicit               8                               it

y of



       is,    is,                  00:00:                             [place]



       uncomplica uncomplica               00                                 Me

dical



       florence    florence                                                     [place]







Allergies, Adverse Reactions, Alerts







       Allergy Allergy Status Severity Reaction(s) Onset  Inactive Treating Comm

ents 

Source



       Name   Type                        Date   Date   Clinician        

 

       NO KNOWN Drug   Active                                           [facility]



       ALLERGIE Class                                                   ity of



       S                                                              [facility]







Social History







           Social Habit Start Date Stop Date  Quantity   Comments   Source

 

           History of                       Smokes tobacco            [facility]



           tobacco use                       daily                 [facility]

 

           Exposure to 2022-10-03 2022-10-13 Not sure              [facility]



           SARS-CoV-2 00:00:00   12:12:00                         [facility]



           (event)                                                [facility]

 

           Tobacco use and 2020 User of smokeless            Un

iversity of



           exposure   00:00:00   00:00:00   tobacco               [facility]

 

           Tobacco Comment 2020 vapes daily,            [facility]

ity of



                      00:00:00   00:00:00   smokless tobacco            [place] Me

dical



                                            twice weekly per            Branch



                                            patient report            

 

           Sex Assigned At 2000                       [facility]

y of



           Birth      00:00:00   00:00:[birth year]                         [facility]









                Smoking Status  Start Date      Stop Date       Source

 

                Smokes tobacco daily 2020 00:00:00                 [facility]

ity [facility]







Medications







       Ordered Filled Start  Stop   Current Ordering Indication Dosage Frequency

 Signature

                    Comments            Components          Source



     Medication Medication Date Date Medication? Clinician                (SIG) 

          



     Name Name                                                   

 

     pantoprazol      2022- No        [id number] 40mg      Take 1          

 [facility]



     e 40 mg EC      0-13 11-13                          tablet by           ity

 of



     tablet      00:00: 05:59                          mouth in           [place]



               00   :00                           the            Medical



                                                  morning           Branch



                                                  and 1           



                                                  tablet in           



                                                  the            



                                                  evening.           



                                                  Do all           



                                                  this for           



                                                  30 days.           

 

     pantoprazol      2022- No        [id number] 40mg      Take 1          

 [facility]



     e 40 mg EC      0-13 11-13                          tablet by           ity

 of



     tablet      00:00: 05:59                          mouth in           [place]



               00   :00                           the            Medical



                                                  morning           Branch



                                                  and 1           



                                                  tablet in           



                                                  the            



                                                  evening.           



                                                  Do all           



                                                  this for           



                                                  30 days.           

 

     pantoprazol      2022- No        [id number] 40mg      Take 1          

 [facility]



     e 40 mg EC      0-13 11-13                          tablet by           ity

 of



     tablet      00:00: 05:59                          mouth in           [place]



               00   :00                           the            Medical



                                                  morning           Branch



                                                  and 1           



                                                  tablet in           



                                                  the            



                                                  evening.           



                                                  Do all           



                                                  this for           



                                                  30 days.           

 

     pantoprazol      2022- No        [id number] 40mg      Take 1          

 [facility]



     e 40 mg EC      0-13 11-13                          tablet by           ity

 of



     tablet      00:00: 05:59                          mouth in           [place]



               00   :00                           the            Medical



                                                  morning           Branch



                                                  and 1           



                                                  tablet in           



                                                  the            



                                                  evening.           



                                                  Do all           



                                                  this for           



                                                  30 days.           

 

     pantoprazol      2022- No        [id number] 40mg      Take 1          

 [facility]



     e 40 mg EC      0-13 11-13                          tablet by           ity

 of



     tablet      00:00: 05:59                          mouth in           [place]



               00   :00                           the            Medical



                                                  morning           Branch



                                                  and 1           



                                                  tablet in           



                                                  the            



                                                  evening.           



                                                  Do all           



                                                  this for           



                                                  30 days.           

 

     pantoprazol      2022- No        [id number] 40mg      Take 1          

 [facility]



     e 40 mg EC      0-13 11-13                          tablet by           ity

 of



     tablet      00:00: 05:59                          mouth in           [place]



               00   :00                           the            Medical



                                                  morning           Branch



                                                  and 1           



                                                  tablet in           



                                                  the            



                                                  evening.           



                                                  Do all           



                                                  this for           



                                                  30 days.           

 

     pantoprazol      2022- No        [id number] 40mg      Take 1          

 [facility]



     e 40 mg EC      0-13 11-13                          tablet by           ity

 of



     tablet      00:00: 05:59                          mouth in           [place]



               00   :00                           the            Medical



                                                  morning           Branch



                                                  and 1           



                                                  tablet in           



                                                  the            



                                                  evening.           



                                                  Do all           



                                                  this for           



                                                  30 days.           

 

     pantoprazol      2022- No        [id number] 40mg      Take 1          

 [facility]



     e 40 mg EC      0-13 11-13                          tablet by           ity

 of



     tablet      00:00: 05:59                          mouth in           [place]



               00   :00                           the            Medical



                                                  morning           Branch



                                                  and 1           



                                                  tablet in           



                                                  the            



                                                  evening.           



                                                  Do all           



                                                  this for           



                                                  30 days.           

 

     maalox:diph      2022 No             15mL      15 mL,           Uni

vers



     enhydrAMINE      0-10 10-10                          Oral,           ity of



     :lidocaine      11:45: 10:48                          ONCE, 1           [facility]

as



     2 % viscous      00   :00                           dose, On           Medi

per



     1:1:1                                         Mon            Branch



     (FIRST-MOUT                                         10/10/22           



     [facility])                                         at 0645,           



     oral                                         Routine           



     suspension                                                        



     15 mL                                                        

 

     ondansetron      2022 No             8mg       8 mg,           Univ

ers



     (ZOFRAN-ODT      0-10 10-10                          Oral,           ity of



     )         11:30: 10:48                          ONCE, 1           [place]



     disintegrat      00   :00                           dose, On           Medi

per



     ing tablet                                         Mon            Branch



     8 mg                                         10/10/22           



                                                  at 0630,           



                                                  Routine           

 

     ondansetron      2022      Yes       [id number] 4mg       Take 1           

[facility]



     4 mg      0-10                               tablet by           ity of



     disintegrat      00:00:                               mouth           [place]



     ing tablet      00                                 every 8           Medica

l



                                                  (eight)           Branch



                                                  hours as           



                                                  needed for           



                                                  Nausea and           



                                                  Vomiting           



                                                  (N/V) or           



                                                  N/V            



                                                  unresponsi           



                                                  ve to           



                                                  [facility]           



                                                  ne.            

 

     proMETHazin      2022      Yes       [id number] 25mg      Take 1           

[facility]



     e 25 mg      0-10                               tablet by           ity of



     tablet      00:00:                               mouth           [place]



               00                                 every 6           Medical



                                                  (six)           Branch



                                                  hours as           



                                                  needed for           



                                                  Nausea and           



                                                  Vomiting           



                                                  (N/V) or           



                                                  N/V            



                                                  unresponsi           



                                                  ve to           



                                                  Ondansetro           



                                                  n.             

 

     ondansetron      2022      Yes       [id number] 4mg       Take 1           

[facility]



     4 mg      0-10                               tablet by           ity of



     disintegrat      00:00:                               mouth           [place]



     ing tablet      00                                 every 8           Medica

l



                                                  (eight)           Branch



                                                  hours as           



                                                  needed for           



                                                  Nausea and           



                                                  Vomiting           



                                                  (N/V) or           



                                                  N/V            



                                                  unresponsi           



                                                  ve to           



                                                  [facility]           



                                                  ne.            

 

     amoxicillin      2022      Yes                      TAKE TWO           Un

rachid



     500 mg      0-10                               (2)            ity of



     capsule      00:00:                               CAPSULE(S)           [place]

s



               00                                 BY MOUTH           Medical



                                                  TWICE A           Branch



                                                  DAY IN           



                                                  [facility] WITH           



                                                  LANSOPRAZO           



                                                  LE AND           



                                                  CLARITHROM           



                                                  YCIN FOR           



                                                  14 DAYS.           

 

     clarithromy      2022      Yes            500mg      Take 500           U

nivers



     carlos 500 mg      0-10                               mg by           ity of



     tablet      00:00:                               mouth in           [place]



               00                                 the            Medical



                                                  morning           Branch



                                                  and 500 mg           



                                                  in the           



                                                  evening.           

 

     pantoprazol      2022      Yes                      TAKE ONE           Un

rachid



     e 40 mg EC      0-10                               (1)            ity of



     tablet      00:00:                               TABLET(S)           [place]



               00                                 BY MOUTH           Medical



                                                  DAILY FOR           Branch



                                                  GASTRIC           



                                                  REFLUX.           

 

     ondansetron      2022      Yes       [id number] 4mg       Take 1           

[facility]



     4 mg      0-10                               tablet by           ity of



     disintegrat      00:00:                               mouth           [place]



     ing tablet      00                                 every 8           Medica

l



                                                  (eight)           Branch



                                                  hours as           



                                                  needed for           



                                                  Nausea and           



                                                  Vomiting           



                                                  (N/V) or           



                                                  N/V            



                                                  unresponsi           



                                                  ve to           



                                                  [facility]           



                                                  ne.            

 

     amoxicillin      2022      Yes                      TAKE TWO           Un

rachid



     500 mg      0-10                               (2)            ity of



     capsule      00:00:                               CAPSULE(S)           [place]

s



               00                                 BY MOUTH           Medical



                                                  TWICE A           Branch



                                                  DAY IN           



                                                  [facility] WITH           



                                                  LANSOPRAZO           



                                                  LE AND           



                                                  CLARITHROM           



                                                  YCIN FOR           



                                                  14 DAYS.           

 

     clarithromy      2022      Yes            500mg      Take 500           U

nivers



     carlos 500 mg      0-10                               mg by           ity of



     tablet      00:00:                               mouth in           [place]



               00                                 the            Medical



                                                  morning           Branch



                                                  and 500 mg           



                                                  in the           



                                                  evening.           

 

     pantoprazol      2022      Yes                      TAKE ONE           Un

rachid



     e 40 mg EC      0-10                               (1)            ity of



     tablet      00:00:                               TABLET(S)           [place]



               00                                 BY MOUTH           Medical



                                                  DAILY FOR           Branch



                                                  GASTRIC           



                                                  REFLUX.           

 

     ondansetron      2022      Yes       [id number] 4mg       Take 1           

[facility]



     4 mg      0-10                               tablet by           ity of



     disintegrat      00:00:                               mouth           [place]



     ing tablet      00                                 every 8           [facility]

l



                                                  (eight)           [facility]



                                                  hours as           



                                                  needed for           



                                                  Nausea and           



                                                  Vomiting           



                                                  (N/V) or           



                                                  N/V            



                                                  unresponsi           



                                                  ve to           



                                                  [facility]           



                                                  ne.            

 

     pantoprazol      2022      Yes                      TAKE ONE           Un

rachid



     e 40 mg EC      0-10                               (1)            ity of



     tablet      00:00:                               TABLET(S)           [place]



               00                                 BY MOUTH           Medical



                                                  DAILY FOR           Branch



                                                  GASTRIC           



                                                  REFLUX.           

 

     ondansetron      2022      Yes       [id number] 4mg       Take 1           

[facility]



     4 mg      0-10                               tablet by           ity of



     disintegrat      00:00:                               mouth           [place]



     ing tablet      00                                 every 8           [facility]

l



                                                  (eight)           Branch



                                                  hours as           



                                                  needed for           



                                                  Nausea and           



                                                  Vomiting           



                                                  (N/V) or           



                                                  N/V            



                                                  unresponsi           



                                                  ve to           



                                                  [facility]           



                                                  ne.            

 

     pantoprazol      2022      Yes                      TAKE ONE           Un

rachid



     e 40 mg EC      0-10                               (1)            ity of



     tablet      00:00:                               TABLET(S)           [place]



               00                                 BY MOUTH           Medical



                                                  DAILY FOR           Branch



                                                  GASTRIC           



                                                  REFLUX.           

 

     ondansetron      2022      Yes       [id number] 4mg       Take 1           

[facility]



     4 mg      0-10                               tablet by           ity of



     disintegrat      00:00:                               mouth           [place]



     ing tablet      00                                 every 8           [facility]

l



                                                  (eight)           Br[facility]



                                                  hours as           



                                                  needed for           



                                                  Nausea and           



                                                  Vomiting           



                                                  (N/V) or           



                                                  N/V            



                                                  unresponsi           



                                                  ve to           



                                                  [facility].            

 

     pantoprazol      2022      Yes                      TAKE ONE           Un

rachid



     e 40 mg EC      0-10                               (1)            ity of



     tablet      00:00:                               TABLET(S)           [place]



               00                                 BY MOUTH           Medical



                                                  DAILY FOR           Branch



                                                  GASTRIC           



                                                  REFLUX.           

 

     ondansetron      2022      Yes       [id number] 4mg       Take 1           

[facility]



     4 mg      0-10                               tablet by           ity of



     disintegrat      00:00:                               mouth           [place]



     ing tablet      00                                 every 8           [facility]

l



                                                  (eight)           Branch



                                                  hours as           



                                                  needed for           



                                                  Nausea and           



                                                  Vomiting           



                                                  (N/V) or           



                                                  N/V            



                                                  unresponsi           



                                                  ve to           



                                                  [facility].            

 

     pantoprazol      2022      Yes                      TAKE ONE           Un

rachid



     e 40 mg EC      0-10                               (1)            ity of



     tablet      00:00:                               TABLET(S)           [place]



               00                                 BY MOUTH           Medical



                                                  DAILY FOR           Branch



                                                  GASTRIC           



                                                  REFLUX.           

 

     ondansetron      2022      Yes       [id number] 4mg       Take 1           

[facility]



     4 mg      0-10                               tablet by           ity of



     disintegrat      00:00:                               mouth           [place]



     ing tablet      00                                 every 8           [facility]

l



                                                  (eight)           Branch



                                                  hours as           



                                                  needed for           



                                                  Nausea and           



                                                  Vomiting           



                                                  (N/V) or           



                                                  N/V            



                                                  unresponsi           



                                                  ve to           



                                                  [facility].            

 

     pantoprazol      2022      Yes                      TAKE ONE           Un

rachid



     e 40 mg EC      0-10                               (1)            ity of



     tablet      00:00:                               TABLET(S)           [place]



               00                                 BY MOUTH           Medical



                                                  DAILY FOR           Branch



                                                  GASTRIC           



                                                  REFLUX.           

 

     ondansetron      2022      Yes       [id number] 4mg       Take 1           

[facility]



     4 mg      0-10                               tablet by           ity of



     disintegrat      00:00:                               mouth           [place]



     ing tablet      00                                 every 8           [facility]

l



                                                  (eight)           [facility]



                                                  hours as           



                                                  needed for           



                                                  Nausea and           



                                                  Vomiting           



                                                  (N/V) or           



                                                  N/V            



                                                  unresponsi           



                                                  ve [facility]           



                                                  [facility]            

 

     pantoprazol      2022      Yes                      TAKE ONE           Un

rachid



     e 40 mg EC      0-10                               (1)            ity of



     tablet      00:00:                               TABLET(S)           [place]



               00                                 BY MOUTH           Medical



                                                  DAILY FOR           Branch



                                                  GASTRIC           



                                                  REFLUX.           

 

     ondansetron      2022      Yes       [id number] 4mg       Take 1           

[facility]



     4 mg      0-10                               tablet by           ity of



     disintegrat      00:00:                               mouth           [place]



     ing tablet      00                                 every 8           [facility]

l



                                                  (eight)           Branch



                                                  hours as           



                                                  needed for           



                                                  Nausea and           



                                                  Vomiting           



                                                  (N/V) or           



                                                  N/V            



                                                  unresponsi           



                                                  ve to           



                                                  [facility]           



                                                  ne.            

 

     pantoprazol      2022      Yes                      TAKE ONE           Un

rachid



     e 40 mg EC      0-10                               (1)            ity of



     tablet      00:00:                               TABLET(S)           [place]



               00                                 BY MOUTH           Medical



                                                  DAILY FOR           Branch



                                                  GASTRIC           



                                                  REFLUX.           

 

     proMETHazin      2022- No        [id number] 25mg      Take 1          

 [facility]



     e 25 mg      0-10 10-13                          tablet by           ity of



     tablet      00:00: 00:00                          mouth           [place]



               00   :00                           every 6           Medical



                                                  (six)           Branch



                                                  hours as           



                                                  needed for           



                                                  Nausea and           



                                                  Vomiting           



                                                  (N/V) or           



                                                  N/V            



                                                  unresponsi           



                                                  ve to           



                                                  Ondansetro           



                                                  n.             

 

     amoxicillin      2022 No                       TAKE TWO           U

nivers



     500 mg      0-10 10-13                          (2)            ity of



     capsule      00:00: 00:00                          CAPSULE(S)           [facility]

as



               00   :00                           BY MOUTH           Medical



                                                  TWICE A           Branch



                                                  DAY IN           



                                                  [facility] WITH           



                                                  LANSOPRAZO           



                                                  LE AND           



                                                  CLARITHROM           



                                                  YCIN FOR           



                                                  14 DAYS.           

 

     clarithromy      2022- No             500mg      Take 500           

[facility]



     carlos 500 mg      0-10 10-13                          mg by           ity of



     tablet      00:00: 00:00                          mouth in           [place]



               00   :00                           the            Medical



                                                  morning           Branch



                                                  and 500 mg           



                                                  in the           



                                                  evening.           

 

     proMETHazin      2022- No        [id number] 25mg      Take 1          

 [facility]



     e 25 mg      0-10 10-13                          tablet by           ity of



     tablet      00:00: 00:00                          mouth           [place]



               00   :00                           every 6           Medical



                                                  (six)           Branch



                                                  hours as           



                                                  needed for           



                                                  Nausea and           



                                                  Vomiting           



                                                  (N/V) or           



                                                  N/V            



                                                  unresponsi           



                                                  ve to           



                                                  Ondansetro           



                                                  n.             

 

     amoxicillin      2022- No                       TAKE TWO           U

nivers



     500 mg      0-10 10-13                          (2)            ity of



     capsule      00:00: 00:00                          CAPSULE(S)           [facility]

as



               00   :00                           BY MOUTH           Medical



                                                  TWICE A           Branch



                                                  DAY IN           



                                                  [facility] WITH           



                                                  LANSOPRAZO           



                                                  LE AND           



                                                  CLARITHROM           



                                                  YCIN FOR           



                                                  14 DAYS.           

 

     clarithromy      2022- No             500mg      Take 500           

[facility]



     carlos 500 mg      0-10 10-13                          mg by           ity of



     tablet      00:00: 00:00                          mouth in           [place]



               00   :00                           the            Medical



                                                  morning           [facility]anch



                                                  and 500 mg           



                                                  in the           



                                                  evening.           

 

     proMETHazin      2022 No        [id number] 25mg      Take 1          

 [facility]



     e 25 mg      0-10 10-13                          tablet by           ity of



     tablet      00:00: 00:00                          mouth           [place]



               00   :00                           every 6           Medical



                                                  (six)           Branch



                                                  hours as           



                                                  needed for           



                                                  Nausea and           



                                                  Vomiting           



                                                  (N/V) or           



                                                  N/V            



                                                  unresponsi           



                                                  ve to           



                                                  Ondansetro           



                                                  n.             

 

     amoxicillin      2022 No                       TAKE TWO           U

nivers



     500 mg      0-10 10-13                          (2)            ity of



     capsule      00:00: 00:00                          CAPSULE(S)           [facility]

as



               00   :00                           BY MOUTH           Medical



                                                  TWICE A           Branch



                                                  DAY IN           



                                                  [facility] WITH           



                                                  LANSOPRAZO           



                                                  LE AND           



                                                  CLARITHROM           



                                                  YCIN FOR           



                                                  14 DAYS.           

 

     clarithromy      2022             500mg      Take 500           

[facility]



     carlos 500 mg      0-10 10-13                          mg by           ity of



     tablet      00:00: 00:00                          mouth in           [place]



               00   :00                           the            Medical



                                                  morning           [facility]anch



                                                  and 500 mg           



                                                  in the           



                                                  evening.           

 

     iopamidol      2022 No        [id number] 100mL      100 mL,         

  [facility]



     (ISOVUE      0-09 10-09                          Intravenou           ity o

f



     370-500 mL)      03:15: 02:16                          s, ONCE, 1          

 [place]



     injection      00   :00                           dose, On           [facility]ica

l



     100 mL                                         Sat            Branch



                                                  10/8/22 at           



                                                  2215,           



                                                  Routine           

 

     NaCl 0.9%      2022      Yes            1000mL      at 999           Univ

ers



     (NS) IV      0-09                               mL/hr,           ity of



     infusion      02:45:                               Intravenou           [facility]

as



     1,000 mL      00                                 s,             Medical



                                                  CONTINUOUS           Branch



                                                  , Starting           



                                                  on Sat           



                                                  10/8/22 at           



                                                  2145,           



                                                  Until           



                                                  Discontinu           



                                                  ed, ASAP           

 

     FENTanyl PF      2022      Yes            50ug      50 mcg,           Uni

vers



     (SUBLIMAZE      0-09                               Slow IV           ity of



     (PF))      02:07:                               Push,           [place]



     injection      29                                 [id number]           Medical



     50 mcg                                         PRN, 3           Branch



                                                  doses,           



                                                  Starting           



                                                  on Sat           



                                                  10/8/22 at           



                                                  2107,           



                                                  Until           



                                                  Discontinu           



                                                  ed, ASAP,           



                                                  Pain           



                                                  (scale           



                                                  7-10)           

 

     maalox:diph      2022 No             15mL      15 mL,           Uni

vers



     enhydrAMINE      0-09 10-09                          Oral,           ity of



     :lidocaine      01:30: 01:25                          ONCE, 1           [facility]

as



     2 % viscous      00   :00                           dose, On           Medi

per



     1:1:1                                         Sat            Branch



     (FIRST-MOUT                                         10/8/22 at           



     [facility])                                         ,           



     oral                                         Routine           



     suspension                                                        



     15 mL                                                        

 

     ketorolac      2022- No             30mg      30 mg,           Unive

rs



     (TORADOL)      0-09 10-09                          Slow IV           ity of



     injection      01:15: 00:22                          Push,           [place]



     30 mg      00   :00                           ONCE, 1           Medical



                                                  dose, On           Branch



                                                  Sat            



                                                  10/8/22 at           



                                                  2015,           



                                                  Routine           

 

     sucralfate      2022      Yes       [id number] 1g        Take 1           

[facility]



     1 gram      0-08                               tablet by           [facility]



     tablet      00:00:                               mouth           [place]



               00                                 before           Medical



                                                  meals and           [facility]



                                                  at             



                                                  bedtime.           

 

     sucralfate      2022      Yes       [id number] 1g        Take 1           

[facility]



     1 gram      0-08                               tablet by           [facility]



     tablet      00:00:                               mouth           [place]



               00                                 before           Medical



                                                  meals and           [facility]



                                                  at             



                                                  bedtime.           

 

     omeprazole      2022- No        [id number] 20mg      Take 1          

 [facility]



     20 mg      0-08 10-23                          capsule by           [facility] of



     capsule      00:00: 04:59                          mouth in           [place]



               00   :00                           the            Medical



                                                  morning           [facility]



                                                  for 14           



                                                  days.           

 

     omeprazole      2022- No        [id number] 20mg      Take 1          

 [facility]



     20 mg      0-08 10-23                          capsule by           [facility]



     capsule      00:00: 04:59                          mouth in           [place]



               00   :00                           the            Medical



                                                  morning           [facility]



                                                  for 14           



                                                  days.           

 

     sucralfate      2022- No        [id number] 1g        Take 1          

 [facility]



     1 gram      0-08 10-13                          tablet by           [facility]



     tablet      00:00: 00:00                          mouth           [place]



               00   :00                           before           Medical



                                                  meals and           [facility]



                                                  at             



                                                  bedtime.           

 

     omeprazole      2022- No        [id number] 20mg      Take 1          

 [facility]



     20 mg      0-08 10-13                          capsule by           [facility]



     capsule      00:00: 00:00                          mouth in           [place]



               00   :00                           the            Medical



                                                  morning           [facility]



                                                  for 14           



                                                  days.           

 

     sucralfate      2022- No        [id number] 1g        Take 1          

 [facility]



     1 gram      0-08 10-13                          tablet by           [facility] of



     tablet      00:00: 00:00                          mouth           [place]



               00   :00                           before           Medical



                                                  meals and           [facility]



                                                  at             



                                                  bedtime.           

 

     omeprazole      2022- No        [id number] 20mg      Take 1          

 [facility]



     20 mg      0-08 10-13                          capsule by           [facility]



     capsule      00:00: 00:00                          mouth in           [place]



               00   :00                           the            Medical



                                                  morning           [facility]



                                                  for 14           



                                                  days.           

 

     sucralfate      2022- No        [id number] 1g        Take 1          

 [facility]



     1 gram      0-08 10-13                          tablet by           [facility] of



     tablet      00:00: 00:00                          mouth           [place]



               00   :00                           before           Medical



                                                  meals and           [facility]



                                                  at             



                                                  bedtime.           

 

     omeprazole      2022- No        [id number] 20mg      Take 1          

 [facility]



     20 mg      0-08 10-13                          capsule by           [facility]



     capsule      00:00: 00:00                          mouth in           [place]



               00   :00                           the            Medical



                                                  morning           [facility]



                                                  for 14           



                                                  days.           

 

     ketorolac      2022- No             30mg      30 mg,           [facility]

rs



     (TORADOL)      0- 10-                          Slow IV           ity of



     injection      03:00: 02:02                          Push,           [place]



     30 mg      00   :00                           ONCE, 1           Medical



                                                  dose, On           [facility]



                                                  Fri            



                                                  22 at           



                                                  2200,           



                                                  Routine           

 

     sodium      2022      Yes            5mL       5 mL,           [facility]



     chloride      0-01                               Intravenou           ity o

f



     (NS)      01:25:                               s, PRN,           [place]



     injection 5      01                                 Starting           Medi

per



     mL                                           on Fri           [facility]



                                                  22 at           



                                                  2025,           



                                                  Until           



                                                  Discontinu           



                                                  ed,            



                                                  Routine,           



                                                  IV line           



                                                  flushing           

 

     famotidine      2022      Yes                      TAKE ONE           [facility]

[facility]



     20 mg      0-01                               (1)            ity of



     tablet      00:00:                               TABLET(S)           [place]



               00                                 BY [facility]           Medical



                                                  EVERY           [facility]anch



                                                  TWELVE           



                                                  HOURS FOR           



                                                  FIVE DAYS.           

 

     famotidine      2022      Yes                      TAKE ONE           [facility]i

vers



     20 mg      0-01                               (1)            ity of



     tablet      00:00:                               TABLET(S)           [place]



                                                BY MOUTH           Medical



                                                  EVERY           [facility]



                                                  TWELVE           



                                                  HOURS FOR           



                                                  FIVE DAYS.           

 

     famotidine      2022      Yes                      TAKE ONE           [facility]

vers



     20 mg      0-01                               (1)            ity of



     tablet      00:00:                               TABLET(S)           [place]



                                                BY MOUTH           Medical



                                                  EVERY           [facility]



                                                  TWELVE           



                                                  HOURS FOR           



                                                  FIVE DAYS.           

 

     famotidine      2022      Yes                      TAKE ONE           Uni

vers



     20 mg      0-01                               (1)            ity of



     tablet      00:00:                               TABLET(S)           [place]



                                                BY MOUTH           Medical



                                                  EVERY           [facility]



                                                  TWELVE           



                                                  HOURS FOR           



                                                  FIVE DAYS.           

 

     famotidine      2022      Yes                      TAKE ONE           Uni

vers



     20 mg      0-01                               (1)            ity of



     tablet      00:00:                               TABLET(S)           [place]



                                                BY MOUTH           Medical



                                                  EVERY           [facility]



                                                  TWELVE           



                                                  HOURS FOR           



                                                  FIVE DAYS.           

 

     famotidine      2022      Yes                      TAKE ONE           Uni

vers



     20 mg      0-01                               (1)            ity of



     tablet      00:00:                               TABLET(S)           [place]



               00                                 BY [facility]



                                                  EVERY           [place]



                                                  TWELVE           



                                                  HOURS FOR           



                                                  FIVE DAYS.           

 

     famotidine      2022      Yes                      TAKE ONE           Uni

vers



     20 mg      0-01                               (1)            ity of



     tablet      00:00:                               TABLET(S)           [place]



               00                                 BY [facility]



                                                  EVERY           [place]



                                                  TWELVE           



                                                  HOURS FOR           



                                                  FIVE DAYS.           

 

     famotidine      2022      Yes                      TAKE ONE           Uni

vers



     20 mg      0-01                               (1)            ity of



     tablet      00:00:                               TABLET(S)           [place]



               00                                 BY [facility]



                                                  EVERY           [place]



                                                  TWELVE           



                                                  HOURS FOR           



                                                  FIVE DAYS.           

 

     famotidine      2022      Yes                      TAKE ONE           Uni

vers



     20 mg      0-01                               (1)            ity of



     tablet      00:00:                               TABLET(S)           [place]



               00                                 BY [facility]



                                                  EVERY           [place]



                                                  TWELVE           



                                                  HOURS FOR           



                                                  FIVE DAYS.           

 

     naproxen            Yes       [id number] 550mg      Take 1           U

nivers



     sodium 550      9-30                               tablet by           ity 

of



     mg tablet      00:00:                               mouth in           [place]

s



               00                                 the            Medical



                                                  morning           [facility]



                                                  and 1           



                                                  tablet in           



                                                  the            



                                                  evening.           



                                                  Take with           



                                                  meals.           

 

     naproxen            Yes       [id number] 550mg      Take 1           U

nivers



     sodium 550      9-30                               tablet by           ity 

of



     mg tablet      00:00:                               mouth in           [place]

s



               00                                 the            Medical



                                                  morning           [facility]



                                                  and 1           



                                                  tablet in           



                                                  the            



                                                  evening.           



                                                  Take with           



                                                  meals.           

 

     naproxen      0      Yes       [id number] 550mg      Take 1           U

nivers



     sodium 550      9-30                               tablet by           ity 

of



     mg tablet      00:00:                               mouth in           [place]

s



               00                                 the            Medical



                                                  morning           [facility]



                                                  and 1           



                                                  tablet in           



                                                  the            



                                                  evening.           



                                                  Take with           



                                                  meals.           

 

     naproxen      0 - No        [id number] 550mg      Take 1           

[facility]



     sodium 550      9-30 10-13                          tablet by           ity

 of



     mg tablet      00:00: 00:00                          mouth in           [place]

as



               00   :00                           the            Medical



                                                  morning           [facility]



                                                  and 1           



                                                  tablet in           



                                                  the            



                                                  evening.           



                                                  Take with           



                                                  meals.           

 

     naproxen      -0 2- No        [id number] 550mg      Take 1           

[facility]



     sodium 550      9-30 10-13                          tablet by           ity

 of



     mg tablet      00:00: 00:00                          mouth in           [place]

as



               00   :00                           the            Medical



                                                  morning           [facility]



                                                  and 1           



                                                  tablet in           



                                                  the            



                                                  evening.           



                                                  Take with           



                                                  meals.           

 

     naproxen      0 - No        [id number] 550mg      Take 1           

[facility]



     sodium 550      9-30 10-13                          tablet by           ity

 of



     mg tablet      00:00: 00:00                          mouth in           [place]

as



               00   :00                           the            Medical



                                                  morning           [facility]



                                                  and 1           



                                                  tablet in           



                                                  the            



                                                  evening.           



                                                  Take with           



                                                  meals.           

 

     HYDROcodone      0 - No             1{tbl}      1 tablet,         

  [facility]



     -acetaminop      2-10 02-10                          Oral,           ity of



     hen (NORCO      05:15: 04:18                          ONCE, 1           [facility]

as



     5) 5-325 mg      00   :00                           dose, Tue           Med

ical



     tablet 1                                         21 at           [facility]



     tablet                                         2315, ASAP           

 

     ondansetron      2021- No             4mg       4 mg, Slow          

 [facility]



     (ZOFRAN      2-03 02-03                          IV Push,           ity of



     (PF))      07:30: 06:35                          ONCE, 1           [place]



     injection 4      00   :00                           dose, Wed           Med

ical



     mg                                           2/3/21 at           [facility]



                                                  0130, ASAP           

 

     morpHINE      2021- No             4mg       4 mg, Slow           Un

rachid



     injection 4       02-03                          IV Push,           ity

 of



     mg        07:30: 06:35                          ONCE, 1           [place]



               00   :00                           dose, Wed           Medical



                                                  2/3/21 at           [facility]



                                                  0130, STAT           

 

     ibuprofen      -0      Yes       [id number] 800mg      Take 1         

  [facility]



     800 mg      2-03                [id number]           tablet by           ity of



     tablet      00:00:                               mouth           [place]



               00                                 every 8           Medical



                                                  (eight)           [facility]



                                                  hours.           

 

     ibuprofen      -0      Yes       [id number] 800mg      Take 1         

  [facility]



     800 mg      2-03                [id number]           tablet by           ity of



     tablet      00:00:                               mouth           [place]



               00                                 every 8           Medical



                                                  (eight)           [facility]



                                                  hours.           

 

     ibuprofen      -0      Yes       [id number] 800mg      Take 1         

  [facility]



     800 mg      2-03                [id number]           tablet by           ity of



     tablet      00:00:                               mouth           [place]



               00                                 every 8           Medical



                                                  (eight)           [facility]



                                                  hours.           

 

     ibuprofen      -0      Yes       [id number] 800mg      Take 1         

  [facility]



     800 mg      2-03                [id number]           tablet by           ity of



     tablet      00:00:                               mouth           [place]



               00                                 every 8           Medical



                                                  (eight)           [facility]



                                                  hours.           

 

     ibuprofen      -0      Yes       [id number] 800mg      Take 1         

  [facility]



     800 mg      2-03                [id number]           tablet by           ity of



     tablet      00:00:                               mouth           [place]



               00                                 every 8           Medical



                                                  (eight)           [facility]



                                                  hours.           

 

     ibuprofen      -0      Yes       [id number] 800mg      Take 1         

  [facility]



     800 mg      2-03                [id number]           tablet by           ity of



     tablet      00:00:                               mouth           [place]



               00                                 every 8           Medical



                                                  (eight)           [facility]



                                                  hours.           

 

     ibuprofen      -0      Yes       [id number] 800mg      Take 1         

  [facility]



     800 mg      2-03                [id number]           tablet by           ity of



     tablet      00:00:                               mouth           [place]



               00                                 every 8           Medical



                                                  (eight)           [facility]



                                                  hours.           

 

     ibuprofen      -0      Yes       [id number] 800mg      Take 1         

  [facility]



     800 mg      2-03                [id number]           tablet by           ity of



     tablet      00:00:                               mouth           [place]



               00                                 every 8           Medical



                                                  (eight)           [facility]anch



                                                  hours.           

 

     ibuprofen      -0      Yes       [id number] 800mg      Take 1         

  [facility]



     800 mg      2-03                [id number]           tablet by           ity of



     tablet      00:00:                               mouth           [place]



               00                                 every 8           Medical



                                                  (eight)           [facility]



                                                  hours.           

 

     ibuprofen      -0      Yes       [id number] 800mg      Take 1         

  [facility]



     800 mg      2-03                [id number]           tablet by           ity of



     tablet      00:00:                               mouth           [place]



               00                                 every 8           Medical



                                                  (eight)           Branch



                                                  hours.           

 

     ibuprofen      -0 - No        [id number] 800mg      Take 1        

   [facility]



     800 mg      2-03 10-13           [id number]           tablet by           ity o

f



     tablet      00:00: 00:00                          mouth           [place]



               00   :00                           every 8           Medical



                                                  (eight)           [facility]



                                                  hours.           

 

     ibuprofen      -0 - No        [id number] 800mg      Take 1        

   [facility]



     800 mg      2-03 10-13           [id number]           tablet by           [facility] o

f



     tablet      00:00: 00:00                          mouth           [place]



               00   :00                           every 8           Medical



                                                  (eight)           [facility]



                                                  hours.           

 

     ibuprofen      -0 2- No        [id number] 800mg      Take 1        

   [facility]



     800 mg      2-03 10-13           [id number]           tablet by           ity o

f



     tablet      00:00: 00:00                          mouth           [place]



               00   :00                           every 8           Medical



                                                  (eight)           Branch



                                                  hours.           

 

     morpHINE      -0 2020- No             4mg       4 mg, Slow           Un

rachid



     injection 4                                IV Push,           ity

 of



     mg        10:49: 11:48                          [facility],           [place]



               49   :49                           Starting           Medical



                                                  Sat 20           [facility]anch



                                                  at 0549,           



                                                  Until Sat           



                                                  20 at           



                                                  0648,           



                                                  Routine,           



                                                  Pain           



                                                  (scale           



                                                  7-10)           

 

     morpHINE      2020-0 2020- No             4mg       4 mg, Slow           Un

r[facility]



     injection 4                                IV Push,           ity

 of



     mg        04:46: 05:14                          Q6HPRN,           [place]



               55   :00                           Starting           Medical



                                                  Fri 20           Branch



                                                  at 2346,           



                                                  Until Sat           



                                                  20 at           



                                                  0014,           



                                                  Routine,           



                                                  Pain           



                                                  (scale           



                                                  7-10)           

 

     HYDROmorpho      2020-0 2020- No             .5mg      0.5 mg,           Un

rachid



     ne        8-08 08-08                          Slow IV           ity of



     (DILAUDID)      01:00: 01:07                          Push,           [place]



     injection      00   :00                           ONCE, 1           Medical



     0.5 mg                                         dose, Fri           Branch



                                                  20 at           



                                                  2000,           



                                                  Routine<br           



                                                  >Use           



                                                  approved           



                                                  by             



                                                  (Faculty):           



                                                  ADC            



                                                  PROVIDER           

 

     simethicone      2020-0      Yes            80mg      80 mg,           [facility]

ers



     (GAS RELIEF      8-08                               Oral,           ity of



     (SIMETHICON      00:53:                               Q6HPRN,           [facility]

as



     E))       45                                 Starting           Medical



     chewable                                         Fri 20           [facility]

h



     tablet 80                                         at 1953,           



     mg                                           Until           



                                                  Discontinu           



                                                  ed,            



                                                  Routine,           



                                                  Gas            

 

     HYDROcodone      2020-0      Yes       4647 1{tbl}      Take 1           Un

rachid



     -acetaminop      8-08                               tablet by           ity

 of



     hen 5-325      00:00:                               mouth           [place]



     mg tablet      00                                 every 6           Medical



                                                  (six)           Branch



                                                  hours as           



                                                  needed for           



                                                  Pain           



                                                  (scale           



                                                  7-10).           



                                                  Indication           



                                                  s: acute           



                                                  pain           

 

     ibuprofen      2020-0      Yes       [id number] 600mg      Take 1           U

nivers



     600 mg      8-08                               tablet by           ity of



     tablet      00:00:                               mouth           [place]



               00                                 every 6           Medical



                                                  (six)           Branch



                                                  hours as           



                                                  needed for           



                                                  Pain           



                                                  (scale           



                                                  4-6).           

 

     HYDROcodone      2020-0      Yes       4647 1{tbl}      Take 1           Un

rachid



     -acetaminop      8-08                               tablet by           ity

 of



     hen 5-325      00:00:                               mouth           [place]



     mg tablet      00                                 every 6           Medical



                                                  (six)           Branch



                                                  hours as           



                                                  needed for           



                                                  Pain           



                                                  (scale           



                                                  7-10).           



                                                  Indication           



                                                  s: acute           



                                                  pain           

 

     ibuprofen      2020-0      Yes       [id number] 600mg      Take 1           U

nivers



     600 mg      8-08                               tablet by           ity of



     tablet      00:00:                               mouth           [place]



               00                                 every 6           Medical



                                                  (six)           Branch



                                                  hours as           



                                                  needed for           



                                                  Pain           



                                                  (scale           



                                                  4-6).           

 

     HYDROcodone      2020-0      Yes       4647 1{tbl}      Take 1           Un

rachid



     -acetaminop      8-08                               tablet by           ity

 of



     hen 5-325      00:00:                               mouth           [place]



     mg tablet      00                                 every 6           Medical



                                                  (six)           Branch



                                                  hours as           



                                                  needed for           



                                                  Pain           



                                                  (scale           



                                                  7-10).           



                                                  Indication           



                                                  s: acute           



                                                  pain           

 

     ibuprofen      2020-0      Yes       [id number] 600mg      Take 1           U

nivers



     600 mg      8-08                               tablet by           ity of



     tablet      00:00:                               mouth           [place]



               00                                 every 6           Medical



                                                  (six)           Branch



                                                  hours as           



                                                  needed for           



                                                  Pain           



                                                  (scale           



                                                  4-6).           

 

     HYDROcodone      2020-0      Yes       4647 1{tbl}      Take 1           Un

rach[facility]



     -acetaminop      8-08                               tablet by           ity

 of



     hen 5-325      00:00:                               mouth           [place]



     mg tablet      00                                 every 6           Medical



                                                  (six)           Branch



                                                  hours as           



                                                  needed for           



                                                  Pain           



                                                  (scale           



                                                  7-10).           



                                                  Indication           



                                                  s: acute           



                                                  pain           

 

     ibuprofen      2020-0      Yes       [id number] 600mg      Take 1           U

nivers



     600 mg      8-08                               tablet by           ity of



     tablet      00:00:                               mouth           [place]



               00                                 every 6           Medical



                                                  (six)           Branch



                                                  hours as           



                                                  needed for           



                                                  Pain           



                                                  (scale           



                                                  4-6).           

 

     HYDROcodone      2020-0      Yes       4647 1{tbl}      Take 1           Un

rachid



     -acetaminop      8-08                               tablet by           ity

 of



     hen 5-325      00:00:                               mouth           [place]



     mg tablet      00                                 every 6           Medical



                                                  (six)           Branch



                                                  hours as           



                                                  needed for           



                                                  Pain           



                                                  (scale           



                                                  7-10).           



                                                  Indication           



                                                  s: acute           



                                                  pain           

 

     ibuprofen      2020-0      Yes       [id number] 600mg      Take 1           U

nivers



     600 mg      8-08                               tablet by           ity of



     tablet      00:00:                               mouth           [place]



               00                                 every 6           Medical



                                                  (six)           Branch



                                                  hours as           



                                                  needed for           



                                                  Pain           



                                                  (scale           



                                                  4-6).           

 

     HYDROcodone      2020-0      Yes       4647 1{tbl}      Take 1           Un

rachid



     -acetaminop      8-08                               tablet by           ity

 of



     hen 5-325      00:00:                               mouth           [place]



     mg tablet      00                                 every 6           Medical



                                                  (six)           Branch



                                                  hours as           



                                                  needed for           



                                                  Pain           



                                                  (scale           



                                                  7-10).           



                                                  Indication           



                                                  s: acute           



                                                  pain           

 

     ibuprofen      2020-0      Yes       [id number] 600mg      Take 1           U

nivers



     600 mg      8-08                               tablet by           ity of



     tablet      00:00:                               mouth           [place]



               00                                 every 6           Medical



                                                  (six)           Branch



                                                  hours as           



                                                  needed for           



                                                  Pain           



                                                  (scale           



                                                  4-6).           

 

     HYDROcodone      2020-0      Yes       4647 1{tbl}      Take 1           Un

rachid



     -acetaminop      8-08                               tablet by           ity

 of



     hen 5-325      00:00:                               mouth           [place]



     mg tablet      00                                 every 6           Medical



                                                  (six)           Branch



                                                  hours as           



                                                  needed for           



                                                  Pain           



                                                  (scale           



                                                  7-10).           



                                                  Indication           



                                                  s: acute           



                                                  pain           

 

     ibuprofen      2020-0      Yes       [id number] 600mg      Take 1           U

nivers



     600 mg      8-08                               tablet by           ity of



     tablet      00:00:                               mouth           [place]



               00                                 every 6           Medical



                                                  (six)           Branch



                                                  hours as           



                                                  needed for           



                                                  Pain           



                                                  (scale           



                                                  4-6).           

 

     HYDROcodone      2020-0      Yes       4647 1{tbl}      Take 1           Un

rachid



     -acetaminop      8-08                               tablet by           ity

 of



     hen 5-325      00:00:                               mouth           [place]



     mg tablet      00                                 every 6           Medical



                                                  (six)           Branch



                                                  hours as           



                                                  needed for           



                                                  Pain           



                                                  (scale           



                                                  7-10).           



                                                  Indication           



                                                  s: acute           



                                                  pain           

 

     ibuprofen      2020-0      Yes       [id number] 600mg      Take 1           U

nivers



     600 mg      8-08                               tablet by           ity of



     tablet      00:00:                               mouth           [place]



               00                                 every 6           Medical



                                                  (six)           Br[facility]



                                                  hours as           



                                                  needed for           



                                                  Pain           



                                                  (scale           



                                                  4-6).           

 

     HYDROcodone      2020-0      Yes       4647 1{tbl}      Take 1           Un

r[facility]



     -acetaminop      8-08                               tablet by           ity

 of



     hen 5-325      00:00:                               mouth           [place]



     mg tablet      00                                 every 6           Medical



                                                  (six)           Branch



                                                  hours as           



                                                  needed for           



                                                  Pain           



                                                  (scale           



                                                  7-10).           



                                                  Indication           



                                                  s: acute           



                                                  pain           

 

     ibuprofen      2020-0      Yes       [id number] 600mg      Take 1           U

nivers



     600 mg      8-08                               tablet by           ity of



     tablet      00:00:                               mouth           [place]



               00                                 every 6           Medical



                                                  (six)           Branch



                                                  hours as           



                                                  needed for           



                                                  Pain           



                                                  (scale           



                                                  4-6).           

 

     HYDROcodone      2020-0      Yes       4647 1{tbl}      Take 1           Un

rachid



     -acetaminop      8-08                               tablet by           ity

 of



     hen 5-325      00:00:                               mouth           [place]



     mg tablet      00                                 every 6           Medical



                                                  (six)           Branch



                                                  hours as           



                                                  needed for           



                                                  Pain           



                                                  (scale           



                                                  7-10).           



                                                  Indication           



                                                  s: acute           



                                                  pain           

 

     ibuprofen      2020-0      Yes       [id number] 600mg      Take 1           U

nivers



     600 mg      8-08                               tablet by           ity of



     tablet      00:00:                               mouth           [place]



               00                                 every 6           Medical



                                                  (six)           Branch



                                                  hours as           



                                                  needed for           



                                                  Pain           



                                                  (scale           



                                                  4-6).           

 

     HYDROcodone      2020-0      Yes       4647 1{tbl}      Take 1           Un

rachid



     -acetaminop      8-08                               tablet by           ity

 of



     hen 5-325      00:00:                               mouth           [place]



     mg tablet      00                                 every 6           Medical



                                                  (six)           Branch



                                                  hours as           



                                                  needed for           



                                                  Pain           



                                                  (scale           



                                                  7-10).           



                                                  Indication           



                                                  s: acute           



                                                  pain           

 

     HYDROcodone      2020-0      Yes       4647 1{tbl}      Take 1           Un

r[facility]



     -acetaminop      8-08                               tablet by           ity

 of



     hen 5-325      00:00:                               mouth           [place]



     mg tablet      00                                 every 6           Medical



                                                  (six)           Branch



                                                  hours as           



                                                  needed for           



                                                  Pain           



                                                  (scale           



                                                  7-10).           



                                                  Indication           



                                                  s: acute           



                                                  pain           

 

     HYDROcodone      2020-0      Yes       4647 1{tbl}      Take 1           [facility]

[facility]



     -acetaminop      8-08                               tablet by           [facility]

 of



     hen 5-325      00:00:                               mouth           [place]



     mg tablet      00                                 every 6           Medical



                                                  (six)           Branch



                                                  hours as           



                                                  needed for           



                                                  Pain           



                                                  (scale           



                                                  7-10).           



                                                  Indication           



                                                  s: acute           



                                                  pain           

 

     HYDROcodone      2020-0      Yes       4647 1{tbl}      Take 1           [facility]

[facility]



     -acetaminop      8-08                               tablet by           [facility]

 of



     hen 5-325      00:00:                               mouth           [place]



     mg tablet      00                                 every 6           Medical



                                                  (six)           Branch



                                                  hours as           



                                                  needed for           



                                                  Pain           



                                                  (scale           



                                                  7-10).           



                                                  Indication           



                                                  s: acute           



                                                  pain           

 

     HYDROcodone      2020-0      Yes       4647 1{tbl}      Take 1           [facility]

[facility]



     -acetaminop      8-08                               tablet by           [facility]

 of



     hen 5-325      00:00:                               mouth           [place]



     mg tablet      00                                 every 6           Medical



                                                  (six)           Branch



                                                  hours as           



                                                  needed for           



                                                  Pain           



                                                  (scale           



                                                  7-10).           



                                                  Indication           



                                                  s: acute           



                                                  pain           

 

     HYDROcodone      2020-0      Yes       4647 1{tbl}      Take 1           [facility]

[facility]



     -acetaminop      8-08                               tablet by           [facility]

 of



     hen 5-325      00:00:                               mouth           [place]



     mg tablet      00                                 every 6           Medical



                                                  (six)           Branch



                                                  hours as           



                                                  needed for           



                                                  Pain           



                                                  (scale           



                                                  7-10).           



                                                  Indication           



                                                  s: acute           



                                                  pain           

 

     HYDROcodone      2020-0      Yes       4647 1{tbl}      Take 1           [facility]

[facility]



     -acetaminop      8-08                               tablet by           [facility]

 of



     hen 5-325      00:00:                               mouth           [place]



     mg tablet      00                                 every 6           Medical



                                                  (six)           Branch



                                                  hours as           



                                                  needed for           



                                                  Pain           



                                                  (scale           



                                                  7-10).           



                                                  Indication           



                                                  s: acute           



                                                  pain           

 

     HYDROcodone      2020-0      Yes       4647 1{tbl}      Take 1           [facility]

[facility]



     -acetaminop      8-08                               tablet by           [facility]

 of



     hen 5-325      00:00:                               mouth           [place]



     mg tablet      00                                 every 6           Medical



                                                  (six)           Branch



                                                  hours as           



                                                  needed for           



                                                  Pain           



                                                  (scale           



                                                  7-10).           



                                                  Indication           



                                                  s: acute           



                                                  pain           

 

     HYDROcodone      2020-0      Yes       4647 1{tbl}      Take 1           [facility]

[facility]



     -acetaminop      8-08                               tablet by           [facility]

 of



     hen 5-325      00:00:                               mouth           [place]



     mg tablet      00                                 every 6           Medical



                                                  (six)           Branch



                                                  hours as           



                                                  needed for           



                                                  Pain           



                                                  (scale           



                                                  7-10).           



                                                  Indication           



                                                  s: acute           



                                                  pain           

 

     HYDROcodone      2020-0      Yes       4647 1{tbl}      Take 1           [facility]

[facility]



     -acetaminop      8-08                               tablet by           [facility]

 of



     hen 5-325      00:00:                               mouth           [place]



     mg tablet      00                                 every 6           Medical



                                                  (six)           Branch



                                                  hours as           



                                                  needed for           



                                                  Pain           



                                                  (scale           



                                                  7-10).           



                                                  Indication           



                                                  s: acute           



                                                  pain           

 

     ibuprofen      2020-0      Yes       [id number] 600mg      Take 1           [facility]

[facility]



     600 mg      8-08                               tablet by           [facility] of



     tablet      00:00:                               mouth           [place]



               00                                 every 6           Medical



                                                  (six)           Branch



                                                  hours as           



                                                  needed for           



                                                  Pain           



                                                  (scale           



                                                  4-6).           

 

     HYDROcodone      2020-0      Yes       4647 1{tbl}      Take 1           [facility]

[facility]



     -acetaminop      8-08                               tablet by           [facility]

 of



     hen 5-325      00:00:                               mouth           [place]



     mg tablet      00                                 every 6           Medical



                                                  (six)           Branch



                                                  hours as           



                                                  needed for           



                                                  Pain           



                                                  (scale           



                                                  7-10).           



                                                  Indication           



                                                  s: acute           



                                                  pain           

 

     ibuprofen      2020-0      Yes       [id number] 600mg      Take 1           [facility]

[facility]



     600 mg      8-08                               tablet by           [facility] of



     tablet      00:00:                               mouth           [place]



               00                                 every 6           Medical



                                                  (six)           Branch



                                                  hours as           



                                                  needed for           



                                                  Pain           



                                                  (scale           



                                                  4-6).           

 

     HYDROcodone      2020-0      Yes       4647 1{tbl}      Take 1           [facility]

[facility]



     -acetaminop      8-08                               tablet by           [facility]

 of



     hen 5-325      00:00:                               mouth           [place]



     mg tablet      00                                 every 6           Medical



                                                  (six)           Branch



                                                  hours as           



                                                  needed for           



                                                  Pain           



                                                  (scale           



                                                  7-10).           



                                                  Indication           



                                                  s: acute           



                                                  pain           

 

     ibuprofen      2020-0      Yes       [id number] 600mg      Take 1           [facility]

[facility]



     600 mg      8-08                               tablet by           [facility] of



     tablet      00:00:                               mouth           [place]



               00                                 every 6           Medical



                                                  (six)           Branch



                                                  hours as           



                                                  needed for           



                                                  Pain           



                                                  (scale           



                                                  4-6).           

 

     ibuprofen      2020-0 - No        [id number] 600mg      Take 1           

[facility]



     600 mg      8-08 10-13                          tablet by           [facility] of



     tablet      00:00: 00:00                          mouth           [place]



               00   :00                           every 6           Medical



                                                  (six)           Branch



                                                  hours as           



                                                  needed for           



                                                  Pain           



                                                  (scale           



                                                  4-6).           

 

     ibuprofen      2020-0 - No        [id number] 600mg      Take 1           

[facility]



     600 mg      8-08 10-13                          tablet by           [facility] of



     tablet      00:00: 00:00                          mouth           [place]



               00   :00                           every 6           Medical



                                                  (six)           Branch



                                                  hours as           



                                                  needed for           



                                                  Pain           



                                                  (scale           



                                                  4-6).           

 

     ibuprofen      2020-0 - No        [id number] 600mg      Take 1           

[facility]



     600 mg      8-08 10-13                          tablet by           [facility] of



     tablet      00:00: 00:00                          mouth           [place]



               00   :00                           every 6           Medical



                                                  (six)           Branch



                                                  hours as           



                                                  needed for           



                                                  Pain           



                                                  (scale           



                                                  4-6).           

 

     HYDROcodone      2020-0 2020- No        4647 1{tbl}      Take 1           [facility]

[facility]



     -acetaminop                                tablet by           it

y of



     hen 5-325      00:00: 00:00                          mouth           [place]



     mg tablet      00   :00                           every 6           Medical



                                                  (six)           Branch



                                                  hours as           



                                                  needed for           



                                                  Pain           



                                                  (scale           



                                                  7-10) for           



                                                  up to 7           



                                                  days.           



                                                  Indication           



                                                  s: acute           



                                                  pain           

 

     ibuprofen      2020-0      Yes            600mg      600 mg,           [facility]

[facility]



     (IBU)                                     Oral, Q6H,           [facility]



     tablet 600      23:00:                               First dose           [facility]

[facility]



     mg        00                                 on Fri           [facility]



                                                  20 at           [facility]



                                                  1800,           



                                                  Until           



                                                  Discontinu           



                                                  ed,            



                                                  Routine           

 

     acetaminoph      -0      Yes            500mg      500 mg,           [facility]

[facility]



     en                                       Oral, Q6H,           [facility]



     (TYLENOL)      23:00:                               First dose           [facility]

[place]



     tablet 500      00                                 on Fri           [facility]



     mg                                           20 at           [facility]



                                                  1800,           



                                                  Until           



                                                  Discontinu           



                                                  ed,            



                                                  Routine           

 

     HYDROmorpho      - 2020- No             .2mg      0.2 mg,           [facility]

[facility]



     ne                                  Slow IV           [facility]



     (DILAUDID)      22:40: 22:59                          Push,           [place]



     injection      39   :26                           Q5MIN PRN,           Medi

per



     0.2 mg                                         10 doses,           [facility]



                                                  Starting           



                                                  20           



                                                  at 1740,           



                                                  Until 20 at           



                                                  1759,           



                                                  Routine,           



                                                  Pain           



                                                  (scale           



                                                  7-10),           



                                                  PACU<br>Us           



                                                  e approved           



                                                  by             



                                                  (Faculty):           



                                                  PACU USE           



                                                  -ANESTHESI           



                                                  A              



                                                  SERVICE-HY           



                                                  DROMORPHON           



                                                  E              



                                                  INJECTIONS           

 

     FENTanyl PF      - 2020- No             25ug      25 mcg,           [facility]

[facility]



     (SUBLIMAZE                                Slow IV           ity o

f



     (PF))      21:34: 21:54                          Push,           [place]



     injection      53   :00                           Q5MIN PRN,           Medi

per



     25 mcg                                         4 doses,           [facility]



                                                  Starting           



                                                  20           



                                                  at 1634,           



                                                  Until 20 at           



                                                  1654,           



                                                  Routine,           



                                                  Pain           



                                                  (scale           



                                                  7-10),           



                                                  PACU           

 

     morpHINE      -0 2020- No             2mg       2 mg, Slow           [facility]

[facility]



     injection 2                                IV Push,           [facility]

 of



     mg        21:15: 04:47                          Q6HPRN,           [place]



               00   :16                           Starting           Medical



                                                  20           [facility]anch



                                                  at 1615,           



                                                  Until 20 at           



                                                  2347,           



                                                  Routine,           



                                                  Pain           



                                                  (scale           



                                                  7-10)           

 

     traMADol      2020-0      Yes            100mg      100 mg,           [facility]

rs



     (ULTRAM)      8-07                               Oral,           [facility]



     tablet 100      21:03:                               Q6HPRN,           [place]

[facility]



     mg        48                                 Starting           Medical



                                                  20           [facility]



                                                  at 1603,           



                                                  Until           



                                                  Discontinu           



                                                  ed,            



                                                  Routine,           



                                                  Pain           



                                                  (scale           



                                                  4-6)           

 

     traMADol      2020-0      Yes            50mg      50 mg,           [facility]



     (ULTRAM)      8-07                               Oral,           [facility]



     tablet 50      21:03:                               Q6HPRN,           [place]



     mg        36                                 Starting           Medical



                                                  20           Branch



                                                  at 1603,           



                                                  Until           



                                                  Discontinu           



                                                  ed,            



                                                  Routine,           



                                                  Pain           



                                                  (scale           



                                                  1-3)           

 

     piperacilli      -0 2020- No             3.375g      3.375 g,          

 [facility]



     n-tazobacta      -08                          IV             [facility] of



     m (ZOSYN)      10:00: 16:00                          Piggyback,           [facility]

[facility]



     3.375 g in      00   :21                           Q6H ABX,           Medic

al



     NaCl 0.9%                                         First dose           [facility]

ch



     (NS) 100 mL                                         (after           



     MINI-BAG                                         last           



                                                  reorder)           



                                                  on 20 at           



                                                  0500,           



                                                  Until           



                                                  Discontinu           



                                                  ed, 100           



                                                  mL<br>Reas           



                                                  on for           



                                                  Anti-Infec           



                                                  tive:           



                                                  Documented           



                                                  Infection<           



                                                  br>Documen           



                                                  [place]            



                                                  Infection           



                                                  Site:           



                                                  Abdominal<           



                                                  br>Duratio           



                                                  n of           



                                                  Therapy: 7           



                                                  days           

 

     NaCl 0.9%      -0 2020- No             1000mL      at 125           Uni

vers



     (NS) IV      -07                          mL/hr, IV           ity of



     infusion      05:45: 22:52                          Infusion,           Matti

as



     1,000 mL      00   :44                           CONTINUOUS           Medic

al



                                                  , Starting           Branch



                                                  20           



                                                  at 0045,           



                                                  Until 20 at           



                                                  1752,           



                                                  Routine           

 

     ondansetron      -0      Yes            4mg       4 mg, Slow           

Univers



     (ZOFRAN                                     IV Push,           ity of



     (PF))      05:42:                               Q6HPRN,           [place]



     injection 4      45                                 Starting           Medi

per



     mg                                           20           Branch



                                                  at 0042,           



                                                  Until           



                                                  Discontinu           



                                                  ed,            



                                                  Routine,           



                                                  Nausea and           



                                                  Vomiting           



                                                  (N/V)           

 

     morpHINE      -0 2020- No             2mg       2 mg, Slow           Un

rachid



     injection 2                                IV Push,           ity

 of



     mg        05:42: 21:04                          Q4HPRN,           [place]



               41   :20                           Starting           Medical



                                                  20           Branch



                                                  at 0042,           



                                                  Until 20 at           



                                                  1604,           



                                                  Routine,           



                                                  Pain           



                                                  (scale           



                                                  7-10)           

 

     piperacilli      2020-0 2020- No             3.375g      3.375 g,          

 Univers



     n-tazobacta                                IV             ity of



     m (ZOSYN)      04:15: 03:58                          Piggyback,           T

[place]



     3.375 g in      00   :00                           ONCE, 1           Medica

l



     NaCl 0.9%                                         dose, Thu           Branc

h



     (NS) 100 mL                                         20 at           



     MINI-BAG                                         2315, 100           



                                                  mL<br>Reas           



                                                  on for           



                                                  Anti-Infec           



                                                  tive:           



                                                  Documented           



                                                  Infection<           



                                                  br>Documen           



                                                  [place]            



                                                  Infection           



                                                  Site:           



                                                  Abdominal<           



                                                  br>Duratio           



                                                  n of           



                                                  Therapy: 7           



                                                  days           

 

     iohexol      -0 2020- No             120mL      120 mL,           Unive

rs



     (OMNIPAQUE                                Intravenou           it

y of



     350       02:45: 02:45                          s, ONCE, 1           [place]



     BULK-150      00   :00                           dose, Thu           Medica

l



     mL)                                          20 at           [place]



     injection                                         2145,           



     120 mL                                         Routine           

 

     NaCl 0.9%      - 2020- No             1000mL      at 999           Uni

vers



     (NS) bolus                                mL/hr,           ity of



     infusion      02:30: 04:26                          1,000 mL,           Matti

as



     1,000 mL      00   :00                           IV             Medical



                                                  Infusion,           [place]



                                                  ONCE, 1           



                                                  dose, Thu           



                                                  20 at           



                                                  2130, ASAP           

 

     ondansetron      -0 2020- No             4mg       4 mg, Slow          

 Univers



     (ZOFRAN                                IV Push,           ity of



     (PF))      02:30: 01:30                          ONCE, 1           [place]



     injection 4      00   :00                           dose, Thu           Med

ical



     mg                                           20 at           Branch



                                                  2130, ASAP           

 

     morpHINE      -0 2020- No             4mg       4 mg, Slow           Un

rachid



     injection 4                                IV Push,           ity

 of



     mg        02:30: 01:30                          ONCE, 1           [place]



               00   :00                           dose, Thu           Medical



                                                  20 at           Branch



                                                  2130, STAT           

 

     famotidine      -0 2020- No             20mg      20 mg,           Univ

ers



     (PEPCID       08-07                          Slow IV           ity of



     (PF))      02:30: 01:30                          Push,           [place]



     injection      00   :00                           ONCE, 1           Medical



     20 mg                                         dose, Thu           Branch



                                                  20 at           



                                                  2130, ASAP           







Vital Signs







             Vital Name   Observation Time Observation Value Comments     Source

 

             Systolic blood 2022-10-13 18:09:00 118 mm[Hg]                Univer

sity of



             pressure                                            [facility]

 

             Diastolic blood 2022-10-13 18:09:00 77 mm[Hg]                 Unive

rsity of



             pressure                                            [facility]

 

             Heart rate   2022-10-13 18:09:00 72 /min                   Universi

ty [facility]

 

             Body temperature 2022-10-13 18:09:00 37.06 Debora                 Univ

ersity of



                                                                 [facility]

 

             Body height  2022-10-13 18:09:00 170.2 cm                  Universi

ty of



                                                                 [facility]

 

             Body weight  2022-10-13 18:09:00 79.47 kg                  Universi

ty [facility]

 

             BMI          2022-10-13 18:09:00 27.44 kg/m2               Universi

ty [facility]

 

             Oxygen saturation in 2022-10-13 18:09:00 98 /min                   

[facility] of



             Arterial blood by                                        [place] [facility]

per



             Pulse oximetry                                        Branch

 

             Systolic blood 2022-10-10 10:50:00 138 mm[Hg]                Univer

sity of



             pressure                                            [facility]

 

             Diastolic blood 2022-10-10 10:50:00 80 mm[Hg]                 Unive

rsity of



             [facility]

 

             Heart rate   2022-10-10 10:50:00 88 /min                   Universi

ty [facility]

 

             Respiratory rate 2022-10-10 10:50:00 18 /min                   Univ

ersity of



                                                                 [facility]

 

             Oxygen saturation in 2022-10-10 10:50:00 100 /min                  

[facility]



             Arterial blood by                                        [place] [facility]

per



             Pulse oximetry                                        Branch

 

             Body temperature 2022-10-10 08:36:00 36.78 Debora                 Univ

ersity of



                                                                 [facility]

 

             Body weight  2022-10-10 08:36:00 86.183 kg                 Universi

ty of



                                                                 [place] [facility]



                                                                 [place]

 

             BMI          2022-10-10 08:36:00 29.76 kg/m2               Universi

ty of



                                                                 [facility]

 

             Systolic blood 2022-10-09 03:40:00 109 mm[Hg]                Univer

sity of



             pressure                                            [place] [facility]



                                                                 Branch

 

             Diastolic blood 2022-10-09 03:40:00 81 mm[Hg]                 Unive

rsity of



             pressure                                            [place] [facility]



                                                                 Branch

 

             Heart rate   2022-10-09 03:40:00 82 /min                   Universi

ty of



                                                                 [place] [facility]



                                                                 Branch

 

             Respiratory rate 2022-10-09 03:40:00 16 /min                   Univ

ersity of



                                                                 [place] [facility]



                                                                 Branch

 

             Oxygen saturation in 2022-10-09 03:40:00 100 /min                  

[facility]



             Arterial blood by                                        [place] [facility]

per



             Pulse oximetry                                        Branch

 

             Body temperature 2022-10-08 23:05:00 35.72 Debora                 Univ

ersity of



                                                                 [place] [facility]



                                                                 Branch

 

             Body height  2022-10-08 23:05:00 170.2 cm                  Universi

ty of



                                                                 [place] [facility]



                                                                 Branch

 

             Body weight  2022-10-08 23:05:00 86.183 kg                 Universi

ty of



                                                                 [place] [facility]



                                                                 Branch

 

             BMI          2022-10-08 23:05:00 29.76 kg/m2               Universi

ty of



                                                                 [place] [facility]



                                                                 Branch

 

             Systolic blood 2022-10-01 03:33:00 123 mm[Hg]                Univer

sity of



             pressure                                            [place] [facility]



                                                                 Branch

 

             Diastolic blood 2022-10-01 03:33:00 77 mm[Hg]                 Unive

rsity of



             pressure                                            [place] [facility]



                                                                 Branch

 

             Heart rate   2022-10-01 03:33:00 63 /min                   Universi

ty of



                                                                 [place] [facility]



                                                                 Branch

 

             Body temperature 2022-10-01 03:33:00 36.61 Debora                 Univ

ersity of



                                                                 [place] [facility]



                                                                 Branch

 

             Respiratory rate 2022-10-01 03:33:00 18 /min                   Univ

ersity of



                                                                 [place] [facility]



                                                                 Branch

 

             Oxygen saturation in 2022-10-01 03:33:00 99 /min                   

[facility]



             Arterial blood by                                        [place] [facility]

per



             Pulse oximetry                                        Branch

 

             Body height  2022-10-01 01:21:00 170.2 cm                  Universi

ty of



                                                                 [place] [facility]



                                                                 Branch

 

             Body weight  2022-10-01 01:21:00 84.823 kg                 Universi

ty of



                                                                 [place] [facility]



                                                                 Branch

 

             BMI          2022-10-01 01:21:00 29.29 kg/m2               Universi

ty of



                                                                 [place] [facility]



                                                                 Branch

 

             Systolic blood 2021-02-10 05:40:00 126 mm[Hg]                Univer

sity of



             pressure                                            [place] [facility]



                                                                 Branch

 

             Diastolic blood 2021-02-10 05:40:00 88 mm[Hg]                 Unive

rsity of



             pressure                                            [place] [facility]



                                                                 Branch

 

             Heart rate   2021-02-10 05:40:00 102 /min                  Universi

ty of



                                                                 [place] [facility]



                                                                 Branch

 

             Respiratory rate 2021-02-10 05:40:00 18 /min                   Univ

ersity of



                                                                 [place] [facility]



                                                                 Branch

 

             Oxygen saturation in 2021-02-10 05:40:00 99 /min                   

[facility]



             Arterial blood by                                        [place] [facility]

per



             Pulse oximetry                                        Branch

 

             Body temperature 2021-02-10 03:49:00 37.61 Debora                 Univ

ersity of



                                                                 [place] [facility]



                                                                 Branch

 

             Body weight  2021-02-10 03:49:00 108.863 kg                Universi

ty of



                                                                 [place] [facility]



                                                                 Branch

 

             BMI          2021-02-10 03:49:00 37.59 kg/m2               Universi

ty of



                                                                 [place] [facility]



                                                                 Branch

 

             Systolic blood 2021 08:03:00 136 mm[Hg]                Univer

sity of



             pressure                                            [place] [facility]



                                                                 Branch

 

             Diastolic blood 2021 08:03:00 91 mm[Hg]                 Unive

rsity of



             pressure                                            [place] [facility]



                                                                 Branch

 

             Heart rate   2021 08:03:00 93 /min                   Universi

ty of



                                                                 [place] [facility]



                                                                 Branch

 

             Respiratory rate 2021 08:03:00 18 /min                   Univ

ersity of



                                                                 [place] [facility]



                                                                 Branch

 

             Oxygen saturation in 2021 08:03:00 99 /min                   

[facility]



             Arterial blood by                                        [place] [facility]

per



             Pulse oximetry                                        Branch

 

             Body temperature 2021 05:32:00 37.28 Debora                 Univ

ersity of



                                                                 [place] [facility]



                                                                 Branch

 

             Body height  2021 05:32:00 170.2 cm                  Universi

ty of



                                                                 [place] [facility]



                                                                 Branch

 

             Body weight  2021 05:32:00 63.504 kg                 Universi

ty of



                                                                 [place] [facility]



                                                                 Branch

 

             BMI          2021 05:32:00 21.93 kg/m2               Universi

ty of



                                                                 [place] [facility]



                                                                 Branch

 

             Body temperature 2020 18:34:00 36.11 Debora                 Univ

ersity of



                                                                 [place] [facility]



                                                                 Branch

 

             Respiratory rate 2020 18:34:00 18 /min                   Univ

ersity of



                                                                 [place] [facility]



                                                                 Branch

 

             Body weight  2020 18:34:00 89.359 kg                 Universi

ty of



                                                                 [place] [facility]



                                                                 Branch

 

             Systolic blood 2020 18:34:00 126 mm[Hg]                Univer

sity of



             pressure                                            [place] [facility]



                                                                 Branch

 

             Diastolic blood 2020 18:34:00 81 mm[Hg]                 Unive

rsity of



             pressure                                            [place] [facility]



                                                                 Branch

 

             Heart rate   2020 18:34:00 76 /min                   Universi

ty [facility]

 

             Systolic blood 2020 16:02:00 112 mm[Hg]                Univer

sity of



             pressure                                            [facility]

 

             Diastolic blood 2020 16:02:00 57 mm[Hg]                 [facility] of



             pressure                                            [facility]

 

             Heart rate   2020 16:02:00 74 /min                   [facility]

 

             Body temperature 2020 16:02:00 36.61 Debora                 [facility]

 

             Respiratory rate 2020 16:02:00 20 /min                   [facility]

 

             Oxygen saturation in 2020 16:02:00 99 /min                   

[facility]



             Arterial blood by                                        [place] [facility]

per



             Pulse oximetry                                        [place]

 

             Body height  2020 14:46:00 170.2 cm                  [facility]

 

             Body weight  2020 14:46:00 90.5 kg                   [facility]

 

             BMI          2020 14:46:00 31.24 kg/m2               [facility]







Procedures







                Procedure       Date / Time     Performing Clinician Source



                                Performed                       

 

                AUTHORIZATION FOR 2022-10-18 05:01:00 Doctor Unassigned, No [facility]



                RELEASE OF PHI                  [facility]

 

                CONSENT/REFUSAL FOR 2022-10-10 08:36:20 Doctor Unassigned, No [facility]

[facility] 

[place]



                DIAGNOSIS AND TREATMENT                 Name            [facility]

 

                CT ABDOMEN PELVIS W 2022-10-09 02:18:50 Morrical, Marco A TEAGUE [facility]



                CONTRAST                                        [facility]

 

                LIPASE          2022-10-09 00:21:00 Bhanu White  [facility]

 

                HEPATIC FUNCTION PANEL 2022-10-09 00:21:00 Bhanu White  [facility]



                ([id number]) (ALB,T.PRO,BILI                                 Medical 

Branch



                T,BU/BC,ALT,AST,ALK                                 



                PHOS)                                           

 

                BASIC METABOLIC PANEL 2022-10-09 00:21:00 Bhanu White  [facility]



                (NA, K, CL, CO2,                                 Medical Branch



                GLUCOSE, BUN,                                   



                CREATININE, CA)                                 

 

                CBC WITH DIFF   2022-10-08 23:36:00 Bhanu White  [facility]

 

                CONSENT/REFUSAL FOR 2022-10-08 22:59:10 Doctor Unassigned, No [facility]



                [facility] TREATMENT                 Name            [facility]

 

                CT ABDOMEN PELVIS WO 2022-10-01 02:11:32 Katerine Stubbs [facility]



                CONTRAST                                        Medical Branch

 

                URINALYSIS      2022-10-01 01:30:00 Katerine Stubbs [facility]

 

                LIPASE          2022-10-01 01:27:00 Katerine Stubbs [facility]

 

                COMP. METABOLIC PANEL 2022-10-01 01:27:00 Katerine Stubbs [facility]



                ([id number])                                         Medical [place]

 

                CBC WITH DIFF   2022-10-01 01:27:00 Katerine Stubbs [facility]

 

                NOTICE OF PRIVACY 2022-10-01 01:16:35 Doctor Unassigned, No [facility]



                PRACTICES                       Name            [facility]

 

                CONSENT/REFUSAL FOR 2022-10-01 01:16:00 Doctor Unassigned, No [facility]



                DIAGNOSIS AND TREATMENT                 Name            [facility] TESTICULAR TORSION 2021-02-10 05:07:54 Elena Queen [facility]

 

                URINALYSIS      2021-02-10 04:18:00 Elena Queen [facility]

 

                US TESTICULAR TORSION 2021 08:31:51 Ifeanyi Cotter [facility]

 

                HEPATIC FUNCTION PANEL 2021 06:34:00 Ifeanyi Cotter [facility]



                ([id number]) (ALB,T.PRO,BILI                                 [facility] 

Branch



                T,BU/BC,ALT,AST,ALK                                 



                PHOS)                                           

 

                BASIC METABOLIC PANEL 2021 06:34:00 Ifeanyi Cotter [facility]



                (NA, K, CL, CO2,                                 Medical Branch



                GLUCOSE, BUN,                                   



                CREATININE, CA)                                 

 

                CBC WITH DIFF   2021 06:34:00 Ifeanyi Cotter [facility]

 

                PROTHROMBIN TIME / INR 2021 06:34:00 [facility]

[facility]

 

                ACTIVATED PARTIAL 2021 06:34:00 Ifeanyi Cotter [facility]



                THRMPLAS SATHYA                                    [facility] Branch

 

                COVID-19 (ID NOW RAPID 2021 06:34:00 Ifeanyi Cotter [facility]



                TESTING)                                        Medical Branch

 

                URINALYSIS      2021 05:40:00 Ifeanyi Cotter [facility]



                                                                Medical [place]

 

                NOTICE OF PRIVACY 2021 05:28:30 Doctor Unassigned, No [facility]



                PRACTICES                       Name            Medical Branch

 

                CONSENT/REFUSAL FOR 2021 05:27:16 Doctor Unassigned, No [facility] 

[place]



                DIAGNOSIS AND TREATMENT                 Name            Medical 

Branch

 

                CONSENT/REFUSAL FOR 2020 18:26:52 Doctor Unassigned, No Un

iversity [facility]



                DIAGNOSIS AND TREATMENT                 Name            Medical 

Branch

 

                BASIC METABOLIC PANEL 2020 11:05:00 [facility]



                (NA, K, CL, CO2,                                 Medical Branch



                GLUCOSE, BUN,                                   



                CREATININE, CA)                                 

 

                CBC WITH DIFF   2020 11:05:00 [facility]

 

                CT ABDOMEN PELVIS W 2020 02:39:28 [facility]

[facility] [place]



                CONTRAST                                        Medical Branch

 

                COVID-19 (ID NOW RAPID 2020 02:28:00 [facility]

[facility] [place]



                TESTING)                                        Medical Branch

 

                URINALYSIS      2020 01:23:00 [facility]

 

                [facility]IP[facility]          [facility]

 

                HEPATIC FUNCTION PANEL 2020 01:22:00 [facility]



                ([id number]) (ALB,T.PRO,BILI                                 Medical 

Branch



                T,BU/BC,ALT,AST,ALK                                 



                PHOS)                                           

 

                BASIC METABOLIC PANEL 2020 01:22:00 [facility]



                (NA, K, CL, CO2,                                 Medical Branch



                GLUCOSE, BUN,                                   



                CREATININE, CA)                                 

 

                CBC WITH DIFF   2020 01:22:00 [facility]

 

                NOTICE OF PRIVACY 2020 00:53:21 Doctor Unassigned, No Univ

[facility]



                PRACTICES                       Name            Medical Branch

 

                CONSENT/REFUSAL FOR 2020 00:52:28 Doctor Unassigned, No [facility]

[facility] 

[place]



                DIAGNOSIS AND TREATMENT                 Name            Medical 

Branch







Encounters







        Start   End     Encounter Admission Attending Care    Care    Encounter 

Source



        Date/Time Date/Time Type    Type    Clinicians Facility Department ID   

   

 

        2021-10-30         Emergency                 [facility]    [id number] 

Univers



        22:41:01                                                         ity of



                                                                        [facility]

 

        2021-10-30         Emergency                 [facility]    [id number] 

Univers



        21:25:25                                                         ity of



                                                                        [facility]

 

        2023 [facility]         [facility]    1.2.840.114 [id number]

[id number] Univers



        00:00:00 00:00:00                 Radha    SPECIALTY 350.1.13.10         

ity of



                                                CARE    4.2.7.2.686         [place]

[place]



                                                CENTER .[id number]         Me

barbie SERRANOY 072             [facility]                   

 

        2023 [facility] R       [facility]    [id number]

[id number] Univers



        13:30:00 13:30:00                 ZACH                          [facility]

 

        2022-10-18 2022-10-18 Orders          Doctor  JUAN M    1.2.840.114 [id number]

[id number] Univers



        00:00:00 00:00:00 Only            Unassigned, JAIDEN   350.1.13.10       

  ity of



                                        [facility] 4.2.7.2.686         Matti

as



                                                        [phone]         [address]

 

        2022-10-17 2022-10-17 Technician         Jere, Kurtis [facility]    1.2.8

40.114 [id number] 

Univers



        13:30:00 13:45:00 Visit           Brandi Kemp 350.1.13.10

         ity of



                                                [place] 4.2.7.2.686         [place]

[place]



                                                PROFESSIO [phone]         Me

barbie CRUZ     353             [facility]                 

 

        2022-10-17 2022-10-17 Outpatient R       VIRIDIANA [facility]    [id number]

[id number] Univers



        13:30:00 13:30:00                 BRANDI                         [facility]

 

        2022-10-13 2022-10-13 Technician         Lab, [facility]    1.2.840.114 [id number]

[id number] Univers



        13:45:00 14:00:00 Visit           Zach Medrano 350.1.13.10 

        ity of



                                                CARE    4.2.7.2.686         [place]

[place]



                                                CENTER .[id number]         Me

dicsilvia CORREIA 353             [facility]                   

 

        2022-10-13 2022-10-13 Outpatient R       KODI [facility]    [id number]

[id number] Univers



        13:45:00 13:45:00                 ZACH                          ity [facility]

 

        2022-10-13 2022-10-13 Office          Radha Doran [facility]    1.2.840.11

[id number] Univers



        12:30:00 13:00:00 Visit           Zach Medrano SPECIALTY 350.1.13.10 

        ity of



                                                CARE    4.2.7.2.686         [facility]

[facility]



                                                CENTER .[id number]         Me

barbie CORREIA [address] [place] [address]                   

 

        2022-10-13 2022-10-13 Telephone         Andreea [facility]    1.2.840.114 [id number]

[id number] Univers



        00:00:00 00:00:00                 Radha    SPECIALTY 350.1.13.10         

ity of



                                                CARE    4.2.7.2.686         [place]

[place]



                                                CENTER .[id number]         Me

barbie CORREIA [address] [place] [address]                   

 

        2022-10-10 2022-10-10 Emergency X       MYRANDA [facility]    ERT     [id number]

[id number] Univers



        03:42:00 06:23:00                 MARCO A                         [facility]

 

        2022-10-10 2022-10-10 Emergency         Morrical, TRAUMA  1.2.840.114 [id number]

[id number] Univers



        03:42:00 06:23:00                 Marco A BERMUDEZ  350.1.13.10         

ity of



                                                        4.2.7.2.686         [place]

[place]



                                                        [phone]         Medi

per



                                                        014             [place]

 

        2022-10-08 2022-10-08 Emergency X       MYRANDA [facility]    ERT     [id number]

[id number] Univers



        18:09:00 22:46:00                 MARCO A kuo [facility]

 

        2022-10-08 2022-10-08 Emergency         Bhanu White [facility]    1.2.840.1

14 [id number] 

Univers



        18:09:00 22:46:00                 Marco A Coyne 350.1.13.

10         ity [facility] 4.2.7.2.686         [facility]  [phone]         Medi

per



                                                        084             [place]

 

        2022 Emergency X       VASQUEZ [facility]    ERT     [id number]

76 Univers



        20:20:00 22:45:00                 WAKILI                          ity [facility]

 

        2022 Emergency         [facility]    1.2.940.614 [id number]

[id number] Univers



        20:20:00 22:45:00                 Wakyriecali GREGORY ARIS 350.1.13.10         

ity of



                                                [facility] 4.2.7.2.686         [facility]  [phone]         Medi

per



                                                        084             [place]

 

        2022 Orders          Doctor  JUAN M    1.2.840.114 [id number]

[id number] Univers



        00:00:00 00:00:00 Only            Unassigned, JAIDEN   350.1.13.10       

  ity of



                                        [facility] 4.2.7.2.686         Matti

as



                                                        [phone]         Medi

per



                                                        009             Branch

 

        2021 2021-02-10 Emergency         [facility]    1.2.656.950 [id number]

[id number] Univers



        21:51:00 00:02:00                 Elena Shane 350.1.13.10         i

ty of



                                                [place] 4.2.7.2.686         [facility]  [phone]         [address]

 

        2021 Office          [facility]    1.2.840.114 [id number]

[id number] 



        14:15:47 14:45:47 Visit           Doris  [employer]  350.1.13.10         



                                                [place]   4.2.7.2.[address] [place] [address]    [phone]         



                                                Primary & [address]             



                                                Specialty                 



                                                Care                    

 

        2021 Office          [facility]    1.2.840.114 [id number]

[id number] Univers



        14:15:47 14:45:47 Visit           Doris  [employer]  350.1.13.10         it

y of



                                                [place]   4.2.7.2.686         [facility]    [phone]         Medi

per



                                                Primary & [address]             Branch



                                                Specialty                 



                                                Care                    

 

        2021 Outpatient R       ROB [facility]    [id number]

[id number] Univers



        14:30:00 14:30:00                 DORIS                          ity of



                                                                        [facility]

 

        2021 Emergency         [facility]    1.2.034.692 [id number]

[id number] Univers



        23:41:00 02:57:00                 Ifeanyi Shane 350.1.13.10         i

ty of



                                                [place] 4.2.7.2.686         [place]

s



                                                [place]  [phone]         Medi

per



                                                        084             [place]

 

        2020 Outpatient R       ZACK [facility]    [id number]

[id number] Univers



        16:15:00 16:15:00                 JANET                          ayaan [facility]

 

        2020 Office          [facility]    1.2.840.114 [id number]

81 Univers



        14:04:13 14:04:13 Visit           Donita SIMPSON Aris 350.1.13.10         

ity of



                                                [place] 4.2.7.2.686         [place]

s



                                                Professio [phone]         Me

dical



                                                [facility]     377             [facility]                 

 

        2020 [facility] R       [facility]    [id number]

[id number] Univers



        13:30:00 13:30:00                 DONITA                          [facility]

 

        2020 Orders          Doctor  JUAN M    1.2.840.114 [id number]

52 Univers



        00:00:00 00:00:00 Only            Unassigned, JAIDEN   350.1.13.10       

  ity of



                                        [facility] 4.2.7.2.686         Matti

as



                                                        [phone]         Medi

per



                                                        009             Branch

 

        2020-08-10 2020-08-10 Transition         Amalia Patterson  1.2.840.114 [id number]

[id number] Univers



        00:00:00 00:00:00 of Care         Liberty Marlow   350.1.13.10        

 ity of



                                                [place]   4.2.7.2.686         [place]

s



                                                        [phone]         Medi

per



                                                        403             Branch

 

        2020 Hospital         David Lloyd [facility]    1.2.840.

114 [id number] 

Univers



        19:57:00 14:33:00 Encounter         Katerine Stubbs 350.1.13.1

0         ity of



                                        Tangela Ly 4.2.7.2.686      

   [facility]  [phone]         Medi

per



                                                        081             Branch

 

        2020 Emergency X       PREMA [facility]    ERT     [id number]

[id number] Univers



        19:57:00 19:57:00                 DAVID                         [facility]







Results







           Test Description Test Time  Test Comments Results    Result Comments 

Source









                    HEPATIC FUNCTION PANEL (81325) (ALB,T.PRO,BILI 2022-10-09 01

:13:35 



                    T,BU/BC,ALT,AST,ALK PHOS)                     









                      Test Item  Value      Reference Range Interpretation Comme

nts









             TOTAL BILI (test code = 5711675964) 0.8 mg/dL    0.1-1.1           

        

 

             BILI UNCON (test code = 8782091043) 0.6 mg/dL    0.1-1.1           

        

 

             BILI CONJ (test code = 6261705857) 0.0 mg/dL    0-0.3              

       

 

             T PROTEIN (test code = 9522380286) 8.8 g/dL     6.3-8.2      H     

       

 

             ALBUMIN (test code = 0905740166) 5.5 g/dL     3.5-5        H       

     

 

             ALK PHOS (test code = 4530988979) 76 U/L                     

      

 

             ALTv (test code = 1742-6) 36 U/L       5-50                      

 

             AST(SGOT) (test code = 3511302016) 35 U/L       13-40              

       

 

             Lab Interpretation (test code = 48683-2) Abnormal                  

             



[facility]





             Test Item    Value        Reference Range Interpretation Comments

 

             LIPASE (test code = 9675771480) 38 U/L       0-220                 

    

 

             Lab Interpretation (test code = Normal                             

    



             63004-2)                                            



[facility] (NA, K, CL, CO2, 
GLUCOSE, BUN, CREATININE, CA)2022-10-09 00:50:55





             Test Item    Value        Reference Range Interpretation Comments

 

             NA (test code = 141 mmol/L   135-145                   



             2696001664)                                         

 

             K (test code = 4.4 mmol/L   3.5-5                     



             3004152399)                                         

 

             CL (test code = 102 mmol/L                       



             1520757265)                                         

 

             CO2 TOTAL (test code = 21 mmol/L    23-31        L            



             0702693804)                                         

 

             AGAP (test code =              2-16         H            



             4141623295)                                         

 

             BUN (test code = 16 mg/dL     7-23                      



             6272296972)                                         

 

             GLUCOSE (test code = 89 mg/dL                         



             3565524742)                                         

 

             CREATININE (test code = 0.70 mg/dL   0.6-1.25                  



             7234978669)                                         

 

             CALCIUM (test code = 10.7 mg/dL   8.6-10.6     H            



             0569280386)                                         

 

             eGFR (test code =              mL/min/1.73m2              



             3226698025)                                         

 

             RICKEY (test code = RICKEY) Association of                           



                          Glomerular Filtration                           



                          Rate (GFR) and Staging                           



                          of Kidney Disease*                           



                          +---------------------                           



                          --+-------------------                           



                          --+-------------------                           



                          ------+| GFR                           



                          (mL/min/1.73 m2) ?|                           



                          With Kidney Damage ?|                           



                          ?Without Kidney                           



                          Damage+---------------                           



                          --------+-------------                           



                          --------+-------------                           



                          ------------+| ?>90 ?                           



                          ? ? ? ? ? ? ? ?|                           



                          ?Stage one ? ? ? ? ?|                           



                          ? Normal ? ? ? ? ? ? ?                           



                          ?+--------------------                           



                          ---+------------------                           



                          ---+------------------                           



                          -------+| ?60-89 ? ? ?                           



                          ? ? ? ? ?| ?Stage two                           



                          ? ? ? ? ?| ? Decreased                           



                          GFR ? ? ? ?                            



                          +---------------------                           



                          --+-------------------                           



                          --+-------------------                           



                          ------+| ?30-59 ? ? ?                           



                          ? ? ? ? ?| ?Stage                           



                          three ? ? ? ?| ? Stage                           



                          three ? ? ? ? ?                           



                          +---------------------                           



                          --+-------------------                           



                          --+-------------------                           



                          ------+| ?15-29 ? ? ?                           



                          ? ? ? ? ?| ?Stage four                           



                          ? ? ? ? | ? Stage four                           



                          ? ? ? ? ?                              



                          ?+--------------------                           



                          ---+------------------                           



                          ---+------------------                           



                          -------+| ?<15 (or                           



                          dialysis) ? ?| ?Stage                           



                          five ? ? ? ? | ? Stage                           



                          five ? ? ? ? ?                           



                          ?+--------------------                           



                          ---+------------------                           



                          ---+------------------                           



                          -------+ *Each stage                           



                          assumes the associated                           



                          GFR level has been in                           



                          effect for at least                           



                          three months. ?Stages                           



                          1 to 5, with or                           



                          without kidney                           



                          disease, indicate                           



                          chronic kidney                           



                          disease. Notes:                           



                          Determination of                           



                          stages one and two                           



                          (with eGFR                             



                          >59mL/min/1.73 m2)                           



                          requires estimation of                           



                          kidney damage for at                           



                          least three months as                           



                          defined by structural                           



                          or functional                           



                          abnormalities of the                           



                          kidney, manifested by                           



                          either:Pathological                           



                          abnormalities or                           



                          Markers of kidney                           



                          damage (including                           



                          abnormalities in the                           



                          composition of the                           



                          blood or urine or                           



                          abnormalities in                           



                          imaging tests).                           

 

             Lab Interpretation Abnormal                               



             (test code = 12586-1)                                        



[facility] WITH DIFF2022-10-09 00:32:54





             Test Item    Value        Reference Range Interpretation Comments

 

             WBC (test code =              See_Comment  H             [Automated



             2590-2)                                             message] The sy

stem



                                                                 which generated



                                                                 this result



                                                                 transmitted



                                                                 reference range

:



                                                                 4.20 - 10.70



                                                                 10*3/?L. The



                                                                 reference range

 was



                                                                 not used to



                                                                 interpret this



                                                                 result as



                                                                 normal/abnormal

.

 

             RBC (test code =              See_Comment  H             [Automated



             789-8)                                              message] The sy

stem



                                                                 which generated



                                                                 this result



                                                                 transmitted



                                                                 reference range

:



                                                                 4.26 - 5.52



                                                                 10*6/?L. The



                                                                 reference range

 was



                                                                 not used to



                                                                 interpret this



                                                                 result as



                                                                 normal/abnormal

.

 

             HGB (test code = 17.2 g/dL    12.2-16.4    H            



             718-7)                                              

 

             HCT (test code = 47.6 %       38.4-49.3                 



             4544-3)                                             

 

             MCV (test code = 81.6 fL      81.7-95.6    L            



             787-2)                                              

 

             MCH (test code = 29.5 pg      26.1-32.7                 



             785-6)                                              

 

             MCHC (test code = 36.1 g/dL    31.2-35      H            



             786-4)                                              

 

             RDW-SD (test code = 37.6 fL      38.5-51.6    L            



             51133-2)                                            

 

             RDW-CV (test code = 12.9 %       12.1-15.4                 



             788-0)                                              

 

             PLT (test code =              See_Comment  H             [Automated



             777-3)                                              message] The sy

stem



                                                                 which generated



                                                                 this result



                                                                 transmitted



                                                                 reference range

:



                                                                 150 - 328 10*3/

?L.



                                                                 The reference r

horacio



                                                                 was not used to



                                                                 interpret this



                                                                 result as



                                                                 normal/abnormal

.

 

             MPV (test code = 10.2 fL      9.8-13                    



             64418-1)                                            

 

             IPF % (test code = 2.5 %        1.2-10.7                  Platelet 

count



             [id number])                                         measured by



                                                                 fluorescence



                                                                 method.

 

             NRBC/100 WBC (test              See_Comment                [Automat

ed



             code = 0711440936)                                        message] 

The system



                                                                 which generated



                                                                 this result



                                                                 transmitted



                                                                 reference range

:



                                                                 0.0 - 10.0 /100



                                                                 WBCs. The refer

ence



                                                                 range was not u

sed



                                                                 to interpret th

is



                                                                 result as



                                                                 normal/abnormal

.

 

             NRBC x10^3 (test code              See_Comment                [Auto

mated



             = [id number])                                        message] The s

ystem



                                                                 which generated



                                                                 this result



                                                                 transmitted



                                                                 reference range

:



                                                                 10*3/?L. The



                                                                 reference range

 was



                                                                 not used to



                                                                 interpret this



                                                                 result as



                                                                 normal/abnormal

.

 

             GRAN MAT (NEUT) % 67.7 %                                 



             (test code = 770-8)                                        

 

             IMM GRAN % (test code 0.30 %                                 



             = 9334546675)                                        

 

             LYMPH % (test code = 23.7 %                                 



             736-9)                                              

 

             MONO % (test code = 7.0 %                                  



             5905-5)                                             

 

             EOS % (test code = 1.0 %                                  



             713-8)                                              

 

             BASO % (test code = 0.3 %                                  



             706-2)                                              

 

             GRAN MAT x10^3(ANC) 7.31 10*3/uL 1.99-6.95    H            



             (test code =                                        



             3321016706)                                         

 

             IMM GRAN x10^3 (test 0.03 10*3/uL 0-0.06                    



             code = 2858595231)                                        

 

             LYMPH x10^3 (test code 2.56 10*3/uL 1.09-3.23                 



             = 731-0)                                            

 

             MONO x10^3 (test code 0.76 10*3/uL 0.36-1.02                 



             = 742-7)                                            

 

             EOS x10^3 (test code = 0.11 10*3/uL 0.06-0.53                 



             711-2)                                              

 

             BASO x10^3 (test code 0.03 10*3/uL 0.01-0.09                 



             = 704-7)                                            

 

             Lab Interpretation Abnormal                               



             (test code = 58408-9)                                        



[facility]





             Test Item    Value        Reference Range Interpretation Comments

 

             NA (test code = 142 mmol/L   135-145                   



             9554930644)                                         

 

             K (test code = 3.9 mmol/L   3.5-5                     



             8169566503)                                         

 

             CL (test code = 104 mmol/L                       



             7235581875)                                         

 

             CO2 TOTAL (test code = 24 mmol/L    23-31                     



             7247718730)                                         

 

             AGAP (test code =              2-16                      



             6377290966)                                         

 

             BUN (test code = 9 mg/dL      7-23                      



             0064698911)                                         

 

             GLUCOSE (test code = 125 mg/dL           H            



             7915496238)                                         

 

             CREATININE (test code = 0.70 mg/dL   0.6-1.25                  



             8703967981)                                         

 

             TOTAL BILI (test code = 0.6 mg/dL    0.1-1.1                   



             6179340246)                                         

 

             CALCIUM (test code = 10.0 mg/dL   8.6-10.6                  



             0322524091)                                         

 

             T PROTEIN (test code = 8.3 g/dL     6.3-8.2      H            



             7197917132)                                         

 

             ALBUMIN (test code = 5.2 g/dL     3.5-5        H            



             9861100209)                                         

 

             ALK PHOS (test code = 81 U/L                           



             4294140455)                                         

 

             ALTv (test code = 35 U/L       5-50                      



             1742-6)                                             

 

             AST(SGOT) (test code = 32 U/L       13-40                     



             1817580916)                                         

 

             eGFR (test code =              mL/min/1.73m2              



             7038109106)                                         

 

             RICKEY (test code = RICKEY) Association of                           



                          Glomerular Filtration                           



                          Rate (GFR) and Staging                           



                          of Kidney Disease*                           



                          +---------------------                           



                          --+-------------------                           



                          --+-------------------                           



                          ------+| GFR                           



                          (mL/min/1.73 m2) ?|                           



                          With Kidney Damage ?|                           



                          ?Without Kidney                           



                          Damage+---------------                           



                          --------+-------------                           



                          --------+-------------                           



                          ------------+| ?>90 ?                           



                          ? ? ? ? ? ? ? ?|                           



                          ?Stage one ? ? ? ? ?|                           



                          ? Normal ? ? ? ? ? ? ?                           



                          ?+--------------------                           



                          ---+------------------                           



                          ---+------------------                           



                          -------+| ?60-89 ? ? ?                           



                          ? ? ? ? ?| ?Stage two                           



                          ? ? ? ? ?| ? Decreased                           



                          GFR ? ? ? ?                            



                          +---------------------                           



                          --+-------------------                           



                          --+-------------------                           



                          ------+| ?30-59 ? ? ?                           



                          ? ? ? ? ?| ?Stage                           



                          three ? ? ? ?| ? Stage                           



                          three ? ? ? ? ?                           



                          +---------------------                           



                          --+-------------------                           



                          --+-------------------                           



                          ------+| ?15-29 ? ? ?                           



                          ? ? ? ? ?| ?Stage four                           



                          ? ? ? ? | ? Stage four                           



                          ? ? ? ? ?                              



                          ?+--------------------                           



                          ---+------------------                           



                          ---+------------------                           



                          -------+| ?<15 (or                           



                          dialysis) ? ?| ?Stage                           



                          five ? ? ? ? | ? Stage                           



                          five ? ? ? ? ?                           



                          ?+--------------------                           



                          ---+------------------                           



                          ---+------------------                           



                          -------+ *Each stage                           



                          assumes the associated                           



                          GFR level has been in                           



                          effect for at least                           



                          three months. ?Stages                           



                          1 to 5, with or                           



                          without kidney                           



                          disease, indicate                           



                          chronic kidney                           



                          disease. Notes:                           



                          Determination of                           



                          stages one and two                           



                          (with eGFR                             



                          >59mL/min/1.73 m2)                           



                          requires estimation of                           



                          kidney damage for at                           



                          least three months as                           



                          defined by structural                           



                          or functional                           



                          abnormalities of the                           



                          kidney, manifested by                           



                          either:Pathological                           



                          abnormalities or                           



                          Markers of kidney                           



                          damage (including                           



                          abnormalities in the                           



                          composition of the                           



                          blood or urine or                           



                          abnormalities in                           



                          imaging tests).                           

 

             Lab Interpretation Abnormal                               



             (test code = 17542-9)                                        



[facility] 01[facility]:09





             Test Item    Value        Reference Range Interpretation Comments

 

             LIPASE (test code = 4062087260) 23 U/L       0-220                 

    

 

             Lab Interpretation (test code = Normal                             

    



             64971-8)                                            



[facility]22[facility] 01:37:47





             Test Item    Value        Reference Range Interpretation Comments

 

             WBC (test code =              See_Comment  H             [Automated



             8549-2)                                             message] The sy

stem



                                                                 which generated



                                                                 this result



                                                                 transmitted



                                                                 reference range

:



                                                                 4.20 - 10.70



                                                                 10*3/?L. The



                                                                 reference range

 was



                                                                 not used to



                                                                 interpret this



                                                                 result as



                                                                 normal/abnormal

.

 

             RBC (test code =              See_Comment                [Automated



             639-8)                                              message] The sy

stem



                                                                 which generated



                                                                 this result



                                                                 transmitted



                                                                 reference range

:



                                                                 4.26 - 5.52



                                                                 10*6/?L. The



                                                                 reference range

 was



                                                                 not used to



                                                                 interpret this



                                                                 result as



                                                                 normal/abnormal

.

 

             HGB (test code = 16.3 g/dL    12.2-16.4                 



             718-7)                                              

 

             HCT (test code = 46.7 %       38.4-49.3                 



             4544-3)                                             

 

             MCV (test code = 84.8 fL      81.7-95.6                 



             787-2)                                              

 

             MCH (test code = 29.6 pg      26.1-32.7                 



             785-6)                                              

 

             MCHC (test code = 34.9 g/dL    31.2-35                   



             786-4)                                              

 

             RDW-SD (test code = 39.0 fL      38.5-51.6                 



             52631-0)                                            

 

             RDW-CV (test code = 12.7 %       12.1-15.4                 



             788-0)                                              

 

             PLT (test code =              See_Comment  H             [Automated



             777-3)                                              message] The sy

stem



                                                                 which generated



                                                                 this result



                                                                 transmitted



                                                                 reference range

:



                                                                 150 - 328 10*3/

?L.



                                                                 The reference r

horacio



                                                                 was not used to



                                                                 interpret this



                                                                 result as



                                                                 normal/abnormal

.

 

             MPV (test code = 9.0 fL       9.8-13       L            



             34561-4)                                            

 

             NRBC/100 WBC (test              See_Comment                [Automat

ed



             code = 3245588441)                                        message] 

The system



                                                                 which generated



                                                                 this result



                                                                 transmitted



                                                                 reference range

:



                                                                 0.0 - 10.0 /100



                                                                 WBCs. The refer

ence



                                                                 range was not u

sed



                                                                 to interpret th

is



                                                                 result as



                                                                 normal/abnormal

.

 

             NRBC x10^3 (test code              See_Comment                [Auto

mated



             = [id number])                                        message] The s

ystem



                                                                 which generated



                                                                 this result



                                                                 transmitted



                                                                 reference range

:



                                                                 10*3/?L. The



                                                                 reference range

 was



                                                                 not used to



                                                                 interpret this



                                                                 result as



                                                                 normal/abnormal

.

 

             GRAN MAT (NEUT) % 79.8 %                                 



             (test code = 770-8)                                        

 

             IMM GRAN % (test code 0.30 %                                 



             = 5294721797)                                        

 

             LYMPH % (test code = 14.4 %                                 



             736-9)                                              

 

             MONO % (test code = 4.7 %                                  



             5905-5)                                             

 

             EOS % (test code = 0.5 %                                  



             713-8)                                              

 

             BASO % (test code = 0.3 %                                  



             706-2)                                              

 

             GRAN MAT x10^3(ANC) 9.21 10*3/uL 1.99-6.95    H            



             (test code =                                        



             3479378459)                                         

 

             IMM GRAN x10^3 (test 0.04 10*3/uL 0-0.06                    



             code = 8517202148)                                        

 

             LYMPH x10^3 (test code 1.66 10*3/uL 1.09-3.23                 



             = 731-0)                                            

 

             MONO x10^3 (test code 0.54 10*3/uL 0.36-1.02                 



             = 742-7)                                            

 

             EOS x10^3 (test code = 0.06 10*3/uL 0.06-0.53                 



             711-2)                                              

 

             BASO x10^3 (test code 0.04 10*3/uL 0.01-0.09                 



             = 704-7)                                            

 

             Lab Interpretation Abnormal                               



             (test code = 80637-5)                                        



[facility]38:51 No 
evidence of testicular torsion. Normal spectral and color Dopplerfindings at 
both testicles. Left testicular microlithiasis. Simple 8 mm right epididymal 
head cyst. Nonspecific partially calcified 4 mm extratesticular nodularity 
adjacent tothe right testicle, likely benign. No suspicious testicular lesions. 
Preliminary Report Dictated by Resident: Ian Siu MD., 
have reviewed this study and agree with the abovereport.US TESTICULAR TORSION 
HISTORY: left testicular pain HadUS last week, was having right testicularpain, 
pain now worse . TECHNIQUE: Testicular ultrasound performed with grayscale, 
color, andspectral Doppler images obtained. COMPARISON: 2/3/2021 FINDINGS: RIGHT
TESTICLE: Normal size, shape, and echotexture without a focal lesion.The right 
testicle measures 4.0 x 1.8 x 2.8 cm (10.6 mL). LEFT TESTICLE: Normal size, 
shape, and echotexture without a focal lesion.Microlithiasis is redemonstrated. 
The left testicle measures 4.3 x 3.1 x2.7 cm (18.9 mL). A lobulated echogenic 
extratesticular nodularity adjacent to the righttestes on image #34 with 
posterior acoustic shadowing measures up to 4 mm,nonspecific but likely benign 
pathology. EPIDIDYMIDES: Unchanged 8 mm anechoic right epididymal head cyst. 
BLOOD FLOW: Bilateral arterial waveforms. The blood flow is grosslysymmetric. 
SCROTUM: No hydrocele. VARICOCELE: None. [facility], Radiant Results Inft User - 
2021 11:40 PM CSTUS TESTICULAR TORSIONHISTORY: left testicular pain Had US
last week, was having right testicularpain, pain now worse .TECHNIQUE: 
Testicular ultrasound performed with grayscale, color, andspectral Doppler 
images obtained.COMPARISON: 2/3/2021FINDINGS: RIGHT TESTICLE: Normal size, 
shape, andechotexture without a focal lesion.The right testicle measures 4.0 x 
1.8 x 2.8 cm (10.6 mL). LEFT TESTICLE: Normal size, shape, and echotexture 
without a focal lesion.Microlithiasis is redemonstrated. The left testicle 
measures 4.3 x 3.1 x2.7 cm (18.9 mL). A lobulated echogenic extratesticular 
nodularity adjacent to the righttestes on image #34 with posterior acoustic 
shadowing measures up to 4 mm,nonspecific but likely benign pathology. 
EPIDIDYMIDES: Unchanged 8 mm anechoic right epididymal head cyst.BLOOD FLOW: 
Bilateral arterial waveforms. The blood flow is grosslysymmetric.SCROTUM: No 
hydrocele.VARICOCELE: None.IMPRESSIONNo evidence of testicular torsion. Normal 
spectral and color Dopplerfindings at both testicles. Left testicular 
microlithiasis.Simple 8 mm right epididymal head cyst.Nonspecific partially 
calcified 4 mm extratesticular nodularity adjacent tothe right testicle, likely 
benign.No suspicious testicular lesions.Preliminary Report Dictated by Resident:
Ian Hardy MD., have reviewed this study and agree with the
abovereport.[facility]-10 04:59:00





             Test Item    Value        Reference Range Interpretation Comments

 

             APPEARANCE (test code = Hazy         Clear        A            



             1834751530)                                         

 

             COLOR (test code = Yellow       Yellow                    



             2045972259)                                         

 

             PH (test code =              4.8-8.0                   



             5074984618)                                         

 

             SP GRAVITY (test code =              1.003-1.030               



             6002337274)                                         

 

             GLU U QUAL (test code = Normal       Normal                    



             7501224019)                                         

 

             BLOOD (test code = Negative     Negative                  



             2975793563)                                         

 

             KETONES (test code = Negative     Negative                  



             2504433816)                                         

 

             PROTEIN (test code = Negative     Negative                  



             2887-8)                                             

 

             UROBILIN (test code = Normal       Normal                    



             1941082178)                                         

 

             BILIRUBIN (test code = Negative     Negative                  



             9803108775)                                         

 

             NITRITE (test code = Negative     Negative                  



             4093843219)                                         

 

             LEUK ROSS (test code = Negative     Negative                  



             0431182077)                                         

 

             RBC/HPF (test code =              See_Comment                [Autom

ated message]



             7243841662)                                         The system [employer]



                                                                 generated this 

result



                                                                 transmitted ref

erence



                                                                 range: 0 - 3 HP

F. The



                                                                 reference range

 was



                                                                 not used to int

erpret



                                                                 this result as



                                                                 normal/abnormal

.

 

             WBC/HPF (test code =              See_Comment                [Autom

ated message]



             0480442210)                                         The system [employer]



                                                                 generated this 

result



                                                                 transmitted ref

erence



                                                                 range: 0 - 5 HP

F. The



                                                                 reference range

 was



                                                                 not used to int

erpret



                                                                 this result as



                                                                 normal/abnormal

.

 

             BACTERIA (test code = Few          Negative     A            



             3564392507)                                         

 

             AMORPHOUS (test code = Few          Rare HPF     A            



             2672884769)                                         

 

             Lab Interpretation (test Abnormal                               



             code = 50658-1)                                        



[facility]ID [facility]20[facility] 
06:59:00





             Test Item    Value        Reference Range Interpretation Comments

 

             SARS-CoV-2 Rapid ID NOW Not Detected Not Detected              



             (test code = 58309-3)                                        

 

             RICKEY (test code = RICKEY) ID NOW COVID-19 Assay                        

   



                          is an isothermal                           



                          nucleic acid                           



                          amplification test                           



                          intended for the                           



                          qualitative detection                           



                          of nucleic acid from                           



                          SARS-CoV-2 viral RNA                           



                          in nasopharyngeal (NP)                           



                          specimens. It is used                           



                          under Emergency Use                           



                          Authorization (EUA) by                           



                          FDA. The limit of                           



                          detection (LOD) of the                           



                          assay is 125 Genome                           



                          Equivalents/mL. A                           



                          positive result is                           



                          indicative of the                           



                          presence of SARS-CoV-2                           



                          RNA. ?Clinical                           



                          correlation with                           



                          patient history and                           



                          other diagnostic                           



                          information is                           



                          necessary to determine                           



                          patient infection                           



                          status. A negative                           



                          (Not Detected) result                           



                          does not preclude                           



                          SARS-CoV-2 infection.                           



                          In patients with                           



                          clinical symptoms and                           



                          other tests that are                           



                          consistent with                           



                          SARS-CoV-2 infection,                           



                          negative results                           



                          should be treated as                           



                          presumptive negative                           



                          and a new specimen                           



                          should be tested with                           



                          alternative PCR                           



                          molecular test.                           



                          Invalid: Please                           



                          collect a new specimen                           



                          for repeat patient                           



                          testing if clinically                           



                          indicated.                             

 

             Lab Interpretation Normal                                 



             (test code = 84892-1)                                        



[facility] Metabolic Panel (NA, K, CL, CO2, 
GLUCOSE, BUN, CREATININE, CA)2021 06:55:00





             Test Item    Value        Reference Range Interpretation Comments

 

             NA (test code = 141 mmol/L   135-145                   



             5062858931)                                         

 

             K (test code = 4.2 mmol/L   3.5-5                     



             1782868988)                                         

 

             CL (test code = 104 mmol/L                       



             8688035795)                                         

 

             CO2 TOTAL (test code = 26 mmol/L    23-31                     



             4512858902)                                         

 

             AGAP (test code =              2-16                      



             9744328211)                                         

 

             BUN (test code = 11 mg/dL     7-23                      



             7215118483)                                         

 

             GLUCOSE (test code = 135 mg/dL           H            



             8515008988)                                         

 

             CREATININE (test code = 0.53 mg/dL   0.6-1.25     L            



             0342696974)                                         

 

             CALCIUM (test code = 9.5 mg/dL    8.6-10.6                  



             8602213490)                                         

 

             eGFR Calculation              mL/min/1.73m2              



             (Non-)                                        



             (test code =                                        



             6917342400)                                         

 

             eGFR Calculation              mL/min/1.73m2              



             (African American)                                        



             (test code =                                        



             4801244033)                                         

 

             RICKEY (test code = RICKEY) Association of                           



                          Glomerular Filtration                           



                          Rate (GFR) and Staging                           



                          of Kidney Disease*                           



                          +---------------------                           



                          --+-------------------                           



                          --+-------------------                           



                          ------+| GFR                           



                          (mL/min/1.73 m2) ?|                           



                          With Kidney Damage ?|                           



                          ?Without Kidney                           



                          Damage+---------------                           



                          --------+-------------                           



                          --------+-------------                           



                          ------------+| ?>90 ?                           



                          ? ? ? ? ? ? ? ?|                           



                          ?Stage one ? ? ? ? ?|                           



                          ? Normal ? ? ? ? ? ? ?                           



                          ?+--------------------                           



                          ---+------------------                           



                          ---+------------------                           



                          -------+| ?60-89 ? ? ?                           



                          ? ? ? ? ?| ?Stage two                           



                          ? ? ? ? ?| ? Decreased                           



                          GFR ? ? ? ?                            



                          +---------------------                           



                          --+-------------------                           



                          --+-------------------                           



                          ------+| ?30-59 ? ? ?                           



                          ? ? ? ? ?| ?Stage                           



                          three ? ? ? ?| ? Stage                           



                          three ? ? ? ? ?                           



                          +---------------------                           



                          --+-------------------                           



                          --+-------------------                           



                          ------+| ?15-29 ? ? ?                           



                          ? ? ? ? ?| ?Stage four                           



                          ? ? ? ? | ? Stage four                           



                          ? ? ? ? ?                              



                          ?+--------------------                           



                          ---+------------------                           



                          ---+------------------                           



                          -------+| ?<15 (or                           



                          dialysis) ? ?| ?Stage                           



                          five ? ? ? ? | ? Stage                           



                          five ? ? ? ? ?                           



                          ?+--------------------                           



                          ---+------------------                           



                          ---+------------------                           



                          -------+ *Each stage                           



                          assumes the associated                           



                          GFR level has been in                           



                          effect for at least                           



                          three months. ?Stages                           



                          1 to 5, with or                           



                          without kidney                           



                          disease, indicate                           



                          chronic kidney                           



                          disease. Notes:                           



                          Determination of                           



                          stages one and two                           



                          (with eGFR                             



                          >59mL/min/1.73 m2)                           



                          requires estimation of                           



                          kidney damage for at                           



                          least three months as                           



                          defined by structural                           



                          or functional                           



                          abnormalities of the                           



                          kidney, manifested by                           



                          either:Pathological                           



                          abnormalities or                           



                          Markers of kidney                           



                          damage (including                           



                          abnormalities in the                           



                          composition of the                           



                          blood or urine or                           



                          abnormalities in                           



                          imaging tests).                           

 

             Lab Interpretation Abnormal                               



             (test code = 18563-1)                                        



[facility] (ALB, T.PRO, BILI T, 
BU/BC, ALT, AST, ALK PHOS)2021 06:55:00





             Test Item    Value        Reference Range Interpretation Comments

 

             TOTAL BILI (test code = 3555099789) 0.5 mg/dL    0.1-1.1           

        

 

             BILI UNCON (test code = 1849696160) 0.2 mg/dL    0.1-1.1           

        

 

             BILI CONJ (test code = 6836246199) 0.0 mg/dL    0-0.3              

       

 

             T PROTEIN (test code = 3226926112) 8.2 g/dL     6.3-8.2            

       

 

             ALBUMIN (test code = 8815478056) 4.8 g/dL     3.5-5                

     

 

             ALK PHOS (test code = 5960691953) 135 U/L             H      

      

 

             ALTv (test code = 1742-6) 51 U/L       5-50         H            

 

             AST(SGOT) (test code = 4626707029) 40 U/L       13-40              

       

 

             Lab Interpretation (test code = Abnormal                           

    



             58123-8)                                            



[facility]





             Test Item    Value        Reference Range Interpretation Comments

 

             APTT Patient (test              See_Comment                [Automat

ed



             code = 3173-2)                                        message] The



                                                                 system which



                                                                 generated this



                                                                 result



                                                                 transmitted



                                                                 reference range

:



                                                                 23 - 38 Seconds

.



                                                                 The reference



                                                                 range was not



                                                                 used to interpr

et



                                                                 this result as



                                                                 normal/abnormal

.

 

             RICKEY (test code = RICKEY) The [facility] patient                           



                          population mean                           



                          normal value for                           



                          aPTT is 30                             



                          seconds.                               

 

             Lab Interpretation Normal                                 



             (test code = 70167-1)                                        



[facility] [id number] 06:51:00





             Test Item    Value        Reference Range Interpretation Comments

 

             PROTIME PATIENT (test              See_Comment                [Auto

mated message]



             code = 5964-2)                                        The system wh

ich



                                                                 generated this 

result



                                                                 transmitted ref

erence



                                                                 range: 12.0 - 1

4.7



                                                                 Seconds. The re

ference



                                                                 range was not u

sed to



                                                                 interpret this 

result



                                                                 as normal/abnor

mal.

 

             INR (test code = 6301-6)                                        Nor

mal INR <1.1;



                                                                 Warfarin Therap

eutic



                                                                 range 2.0 to 3.

0 or



                                                                 2.5 to 3.5, dep

ending



                                                                 upon the indica

tions.

 

             Lab Interpretation (test Normal                                 



             code = 12398-9)                                        



[facility] with [id number] 06:44:00





             Test Item    Value        Reference Range Interpretation Comments

 

             WBC (test code =              See_Comment                [Automated



             6690-2)                                             message] The sy

stem



                                                                 which generated



                                                                 this result



                                                                 transmitted



                                                                 reference range

:



                                                                 4.20 - 10.70



                                                                 10*3/?L. The



                                                                 reference range

 was



                                                                 not used to



                                                                 interpret this



                                                                 result as



                                                                 normal/abnormal

.

 

             RBC (test code =              See_Comment                [Automated



             789-8)                                              message] The sy

stem



                                                                 which generated



                                                                 this result



                                                                 transmitted



                                                                 reference range

:



                                                                 4.26 - 5.52



                                                                 10*6/?L. The



                                                                 reference range

 was



                                                                 not used to



                                                                 interpret this



                                                                 result as



                                                                 normal/abnormal

.

 

             HGB (test code = 15.0 g/dL    12.2-16.4                 



             718-7)                                              

 

             HCT (test code = 44.6 %       38.4-49.3                 



             4544-3)                                             

 

             MCV (test code = 85.0 fL      81.7-95.6                 



             787-2)                                              

 

             MCH (test code = 28.6 pg      26.1-32.7                 



             785-6)                                              

 

             MCHC (test code = 33.6 g/dL    31.2-35                   



             786-4)                                              

 

             RDW-SD (test code = 37.9 fL      38.5-51.6    L            



             13735-2)                                            

 

             RDW-CV (test code = 12.3 %       12.1-15.4                 



             788-0)                                              

 

             PLT (test code =              See_Comment  H             [Automated



             777-3)                                              message] The sy

stem



                                                                 which generated



                                                                 this result



                                                                 transmitted



                                                                 reference range

:



                                                                 150 - 328 10*3/

?L.



                                                                 The reference r

horacio



                                                                 was not used to



                                                                 interpret this



                                                                 result as



                                                                 normal/abnormal

.

 

             MPV (test code = 8.9 fL       9.8-13       L            



             48002-0)                                            

 

             NRBC/100 WBC (test              See_Comment                [Automat

ed



             code = 1610749778)                                        message] 

The system



                                                                 which generated



                                                                 this result



                                                                 transmitted



                                                                 reference range

:



                                                                 0.0 - 10.0 /100



                                                                 WBCs. The refer

ence



                                                                 range was not u

sed



                                                                 to interpret th

is



                                                                 result as



                                                                 normal/abnormal

.

 

             NRBC x10^3 (test code <0.01        See_Comment                [Auto

mated



             = [id number])                                        message] The s

ystem



                                                                 which generated



                                                                 this result



                                                                 transmitted



                                                                 reference range

:



                                                                 10*3/?L. The



                                                                 reference range

 was



                                                                 not used to



                                                                 interpret this



                                                                 result as



                                                                 normal/abnormal

.

 

             GRAN MAT (NEUT) % 53.1 %                                 



             (test code = 770-8)                                        

 

             IMM GRAN % (test code 0.50 %                                 



             = 6286112693)                                        

 

             LYMPH % (test code = 36.1 %                                 



             736-9)                                              

 

             MONO % (test code = 7.6 %                                  



             5905-5)                                             

 

             EOS % (test code = 2.3 %                                  



             713-8)                                              

 

             BASO % (test code = 0.4 %                                  



             706-2)                                              

 

             GRAN MAT x10^3(ANC) 4.14 10*3/uL 1.99-6.95                 



             (test code =                                        



             8045801333)                                         

 

             IMM GRAN x10^3 (test 0.04 10*3/uL 0-0.06                    



             code = 5632722951)                                        

 

             LYMPH x10^3 (test code 2.81 10*3/uL 1.09-3.23                 



             = 731-0)                                            

 

             MONO x10^3 (test code 0.59 10*3/uL 0.36-1.02                 



             = 742-7)                                            

 

             EOS x10^3 (test code = 0.18 10*3/uL 0.06-0.53                 



             711-2)                                              

 

             BASO x10^3 (test code 0.03 10*3/uL 0.01-0.09                 



             = 704-7)                                            

 

             Lab Interpretation Abnormal                               



             (test code = 04858-8)                                        



[facility]





             Test Item    Value        Reference Range Interpretation Comments

 

             APPEARANCE (test code = Hazy         Clear        A            



             8434411675)                                         

 

             COLOR (test code = Yellow       Yellow                    



             5667912051)                                         

 

             PH (test code =              4.8-8.0                   



             8133661620)                                         

 

             SP GRAVITY (test code =              1.003-1.030               



             4583717311)                                         

 

             GLU U QUAL (test code = Normal       Normal                    



             9764647113)                                         

 

             BLOOD (test code = Negative     Negative                  



             5299491235)                                         

 

             KETONES (test code = Negative     Negative                  



             3376982526)                                         

 

             PROTEIN (test code = Negative     Negative                  



             2887-8)                                             

 

             UROBILIN (test code = 2.0 mg/dL    Normal       A            



             9273002483)                                         

 

             BILIRUBIN (test code = Negative     Negative                  



             4033929105)                                         

 

             NITRITE (test code = Negative     Negative                  



             8832407017)                                         

 

             LEUK ROSS (test code = Negative     Negative                  



             9708916250)                                         

 

             RBC/HPF (test code =              See_Comment                [Autom

ated message]



             6363737322)                                         The system [employer]



                                                                 generated this



                                                                 result transmit

[place]



                                                                 reference range

: 0 -



                                                                 3 HPF. The refe

rence



                                                                 range was not u

sed



                                                                 to interpret th

is



                                                                 result as



                                                                 normal/abnormal

.

 

             WBC/HPF (test code =              See_Comment                [Autom

ated message]



             2393975045)                                         The system [employer]



                                                                 generated this



                                                                 result transmit

[place]



                                                                 reference range

: 0 -



                                                                 5 HPF. The refe

rence



                                                                 range was not u

sed



                                                                 to interpret th

is



                                                                 result as



                                                                 normal/abnormal

.

 

             BACTERIA (test code = Negative     Negative                  



             9560301990)                                         

 

             MUCOUS (test code = Slight       Negative LPF A            



             9721174372)                                         

 

             AMORPHOUS (test code = Rare         Rare HPF                  



             4911681954)                                         

 

             Lab Interpretation (test Abnormal                               



             code = 01870-3)                                        



[facility] Metabolic Panel (NA, K, CL, CO2, 
GLUCOSE, BUN, CREATININE, CA)2020 12:37:00





             Test Item    Value        Reference Range Interpretation Comments

 

             NA (test code = 136 mmol/L   135-145                   



             5091630297)                                         

 

             K (test code = 4.0 mmol/L   3.5-5                     



             6694157407)                                         

 

             CL (test code = 106 mmol/L                       



             8282557338)                                         

 

             CO2 TOTAL (test code = 23 mmol/L    23-31                     



             7648824508)                                         

 

             AGAP (test code =              2-16                      



             1945687166)                                         

 

             BUN (test code = 10 mg/dL     7-23                      



             9704942284)                                         

 

             GLUCOSE (test code = 122 mg/dL           H            



             8353372263)                                         

 

             CREATININE (test code = 0.61 mg/dL   0.6-1.25                  



             0095049705)                                         

 

             CALCIUM (test code = 9.2 mg/dL    8.6-10.6                  



             2753854397)                                         

 

             eGFR Calculation              mL/min/1.73m2              



             (Non-)                                        



             (test code =                                        



             6164496970)                                         

 

             eGFR Calculation              mL/min/1.73m2              



             (African American)                                        



             (test code =                                        



             7755169959)                                         

 

             RICKEY (test code = RICKEY) Association of                           



                          Glomerular Filtration                           



                          Rate (GFR) and Staging                           



                          of Kidney Disease*                           



                          +---------------------                           



                          --+-------------------                           



                          --+-------------------                           



                          ------+| GFR                           



                          (mL/min/1.73 m2) ?|                           



                          With Kidney Damage ?|                           



                          ?Without Kidney                           



                          Damage+---------------                           



                          --------+-------------                           



                          --------+-------------                           



                          ------------+| ?>90 ?                           



                          ? ? ? ? ? ? ? ?|                           



                          ?Stage one ? ? ? ? ?|                           



                          ? Normal ? ? ? ? ? ? ?                           



                          ?+--------------------                           



                          ---+------------------                           



                          ---+------------------                           



                          -------+| ?60-89 ? ? ?                           



                          ? ? ? ? ?| ?Stage two                           



                          ? ? ? ? ?| ? Decreased                           



                          GFR ? ? ? ?                            



                          +---------------------                           



                          --+-------------------                           



                          --+-------------------                           



                          ------+| ?30-59 ? ? ?                           



                          ? ? ? ? ?| ?Stage                           



                          three ? ? ? ?| ? Stage                           



                          three ? ? ? ? ?                           



                          +---------------------                           



                          --+-------------------                           



                          --+-------------------                           



                          ------+| ?15-29 ? ? ?                           



                          ? ? ? ? ?| ?Stage four                           



                          ? ? ? ? | ? Stage four                           



                          ? ? ? ? ?                              



                          ?+--------------------                           



                          ---+------------------                           



                          ---+------------------                           



                          -------+| ?<15 (or                           



                          dialysis) ? ?| ?Stage                           



                          five ? ? ? ? | ? Stage                           



                          five ? ? ? ? ?                           



                          ?+--------------------                           



                          ---+------------------                           



                          ---+------------------                           



                          -------+ *Each stage                           



                          assumes the associated                           



                          GFR level has been in                           



                          effect for at least                           



                          three months. ?Stages                           



                          1 to 5, with or                           



                          without kidney                           



                          disease, indicate                           



                          chronic kidney                           



                          disease. Notes:                           



                          Determination of                           



                          stages one and two                           



                          (with eGFR                             



                          >59mL/min/1.73 m2)                           



                          requires estimation of                           



                          kidney damage for at                           



                          least three months as                           



                          defined by structural                           



                          or functional                           



                          abnormalities of the                           



                          kidney, manifested by                           



                          either:Pathological                           



                          abnormalities or                           



                          Markers of kidney                           



                          damage (including                           



                          abnormalities in the                           



                          composition of the                           



                          blood or urine or                           



                          abnormalities in                           



                          imaging tests).                           

 

             Lab Interpretation Abnormal                               



             (test code = 13487-9)                                        



[facility] with Unnzuwxmybjr2639-48-47 12:27:00





             Test Item    Value        Reference Range Interpretation Comments

 

             WBC (test code =              See_Comment                [Automated



             4926-2)                                             message] The



                                                                 system which



                                                                 generated this



                                                                 result transmit

[place]



                                                                 reference range

:



                                                                 4.50 - 13.50



                                                                 10*3/?L. The



                                                                 reference range



                                                                 was not used to



                                                                 interpret this



                                                                 result as



                                                                 normal/abnormal

.

 

             RBC (test code =              See_Comment                [Automated



             716-8)                                              message] The



                                                                 system which



                                                                 generated this



                                                                 result transmit

[place]



                                                                 reference range

:



                                                                 4.50 - 5.30



                                                                 10*6/?L. The



                                                                 reference range



                                                                 was not used to



                                                                 interpret this



                                                                 result as



                                                                 normal/abnormal

.

 

             HGB (test code = 14.2 g/dL    13-16                     



             718-7)                                              

 

             HCT (test code = 42.1 %       37-49                     



             4544-3)                                             

 

             MCV (test code = 85.9 fL      78-95                     



             787-2)                                              

 

             MCH (test code = 29.0 pg      26-32                     



             785-6)                                              

 

             MCHC (test code = 33.7 g/dL    32-36                     



             786-4)                                              

 

             RDW-SD (test code = 38.9 fL      38.5-49                   



             04336-2)                                            

 

             RDW-CV (test code = 12.4 %       11.5-14                   



             788-0)                                              

 

             PLT (test code =              See_Comment                [Automated



             777-3)                                              message] The



                                                                 system which



                                                                 generated this



                                                                 result transmit

[place]



                                                                 reference range

:



                                                                 133 - 320 10*3/

?L.



                                                                 The reference



                                                                 range was not u

sed



                                                                 to interpret th

is



                                                                 result as



                                                                 normal/abnormal

.

 

             MPV (test code = 9.6 fL       9.3-12.9                  



             47872-9)                                            

 

             NRBC/100 WBC (test              See_Comment                [Automat

ed



             code = 7218138073)                                        message] 

The



                                                                 system which



                                                                 generated this



                                                                 result transmit

[place]



                                                                 reference range

:



                                                                 0.0 - 10.0 /100



                                                                 WBCs. The



                                                                 reference range



                                                                 was not used to



                                                                 interpret this



                                                                 result as



                                                                 normal/abnormal

.

 

             NRBC x10^3 (test code <0.01        See_Comment                [Auto

mated



             = 6303802332)                                        message] The



                                                                 system which



                                                                 generated this



                                                                 result transmit

[place]



                                                                 reference range

:



                                                                 10*3/?L. The



                                                                 reference range



                                                                 was not used to



                                                                 interpret this



                                                                 result as



                                                                 normal/abnormal

.

 

             GRAN MAT (NEUT) % 81.7 %                                 



             (test code = 770-8)                                        

 

             IMM GRAN % (test code 0.50 %                                 



             = 6574326714)                                        

 

             LYMPH % (test code = 11.6 %                                 



             736-9)                                              

 

             MONO % (test code = 6.0 %                                  



             5905-5)                                             

 

             EOS % (test code = 0.0 %                                  



             713-8)                                              

 

             BASO % (test code = 0.2 %                                  



             706-2)                                              

 

             GRAN MAT x10^3(ANC) 10.61 10*3/uL 1.5-10.3     H            



             (test code =                                        



             1491369346)                                         

 

             IMM GRAN x10^3 (test 0.06 10*3/uL 0-0.06                    



             code = 4815737639)                                        

 

             LYMPH x10^3 (test code 1.51 10*3/uL 0.7-7.4                   



             = 731-0)                                            

 

             MONO x10^3 (test code 0.78 10*3/uL 0-0.5        H            



             = 742-7)                                            

 

             EOS x10^3 (test code = <0.03        0-0.4                     



             711-2)                                              

 

             BASO x10^3 (test code <0.03        0-0.1                     



             = 704-7)                                            

 

             Lab Interpretation Abnormal                               



             (test code = 60883-2)                                        



[facility] [id number] 
04:43:28 1. ?Fluid-filled, thickened and mildly dilated appendix with 
minimaladjacent fat stranding, consistent with early acute appendicitis. 
Noevidence of perforation or organized collection identified. Findings were 
conveyed to Dr. Stubbs at 10:05 PM on 2020. Preliminary Report Dictated by 
Resident: Cinthia Lincoln ?MD. Phillip, have reviewed this study and 
agree with the abovereport. EXAM: CTABDOMEN AND PELVIS WITH CONTRAST HISTORY: 
19-year-old male complaints of right lower quadrant pain. COMPARISON: None. 
TECHNIQUE AND FINDINGS: Contiguous axial imaging from the level of the lungbases
through the pubic symphysis was performed after the uncomplicatedadministration 
of 120 cc of intravenous Omnipaque contrast. Coronal andsagittal reconstructions
were obtained. ?Auto mA and/or iterativereconstruction were used to reduce 
radiation dose. FINDINGS: LOWER THORAX: The lungs bases are clear. No
cardiomegaly. LIVER: No focal hepatic lesions. ?Normal contour. GALLBLADDER AND 
BILIARY TREE: No biliary ductal dilation. The gallbladderis physiologically 
distended. No gallbladder wall thickening. SPLEEN: No splenomegaly. PANCREAS: No
ductal dilation or masses. ADRENAL GLANDS: No adrenal nodules. KIDNEYS: No 
hydronephrosis, stones or masses are identified. PERITONEUM AND RETROPERITONEUM:
No free air or fluid. LYMPH NODES: Scattered bilateral inguinal lymph nodes, 
measuring up to 1cm, possibly reactive. GI TRACT: The appendix is dilated up to 
0.8 cm and is fluid-filled withthickened enhancing walland minimal adjacent fat 
stranding. Noperiappendiceal free fluid, air or organized when compared No b
oweldilation or wall thickening. PELVIS/BLADDER: The bladder is decompressed. 
VESSELS: Unremarkable.BONES AND SOFT TISSUES: No suspicious lytic or sclerotic 
bony lesions. [facility], Radiant Results In[facility] User - 2020 11:44 PM CDTEXAM: CT 
ABDOMEN AND PELVIS WITH CONTRASTHISTORY: 19-year-old male complaints of right 
lower quadrant pain.COMPARISON: None.TECHNIQUE AND FINDINGS: Contiguous axial 
imaging from the level of the lungbases through the pubic symphysis was 
performed after the uncomplicatedadministration of 120 cc of intravenous 
Omnipaque contrast. Coronal andsagittal reconstructions were obtained. Auto mA 
and/or iterativereconstruction were used to reduce radiation dose.FINDINGS:LOWER
THORAX:The lungs bases are clear. No cardiomegaly.LIVER: No focal hepatic 
lesions. Normal contour.GALLBLADDER AND BILIARY TREE: No biliary ductal 
dilation. The gallbladderis physiologically distended. No gallbladder wall 
thickening.SPLEEN: No splenomegaly.PANCREAS: No ductal dilation or 
masses.ADRENAL GLANDS: No adrenal nodules.KIDNEYS: No hydronephrosis, stones or 
masses are identified.PERITONEUM AND RETROPERITONEUM: No free air or fluid.LYMPH
NODES: Scattered bilateral inguinal lymph nodes, measuring up to 1cm, possibly 
reactive.GI TRACT: The appendix is dilated up to 0.8 cm and is fluid-filled 
withthickened enhancing wall and minimal adjacent fat stranding. 
Noperiappendiceal free fluid, air or organized when compared No boweldilation or
wall thickening.PELVIS/BLADDER: The bladder is decompressed.VESSELS: 
Unremarkable.BONES AND SOFT TISSUES: No suspicious lytic or sclerotic bony 
lesions.IMPRESSION1. Fluid-filled, thickened and mildly dilated appendix with 
minimaladjacent fat stranding, consistent with early acute appendicitis. 
Noevidence of perforation or organized collection identified.Findings were 
conveyed to Dr. Stubbs at 10:05 PM on 2020.Preliminary Report Dictated by 
Resident: Alexis Null, Cinthia Fisher MD., have reviewed this study and agree
with the abovereport.[facility] NOW RAPID 
TESTING)2020 03:32:00





             Test Item    Value        Reference Range Interpretation Comments

 

             SARS-CoV-2 Rapid ID NOW Not Detected Not Detected              



             (test code = 43780-2)                                        

 

             RICKEY (test code = RICKEY) ID NOW COVID-19 Assay                        

   



                          is an isothermal                           



                          nucleic acid                           



                          amplification test                           



                          intended for the                           



                          qualitative detection                           



                          of nucleic acid from                           



                          SARS-CoV-2 viral RNA                           



                          in nasopharyngeal (NP)                           



                          specimens. It is used                           



                          under Emergency Use                           



                          Authorization (EUA) by                           



                          FDA. The limit of                           



                          detection (LOD) of the                           



                          assay is 125 Genome                           



                          Equivalents/mL. A                           



                          positive result is                           



                          indicative of the                           



                          presence of SARS-CoV-2                           



                          RNA. ?Clinical                           



                          correlation with                           



                          patient history and                           



                          other diagnostic                           



                          information is                           



                          necessary to determine                           



                          patient infection                           



                          status. A negative                           



                          (Not Detected) result                           



                          does not preclude                           



                          SARS-CoV-2 infection.                           



                          In patients with                           



                          clinical symptoms and                           



                          other tests that are                           



                          consistent with                           



                          SARS-CoV-2 infection,                           



                          negative results                           



                          should be treated as                           



                          presumptive negative                           



                          and a new specimen                           



                          should be tested with                           



                          alternative PCR                           



                          molecular test.                           



                          Invalid: Please                           



                          collect a new specimen                           



                          for repeat patient                           



                          testing if clinically                           



                          indicated.                             

 

             Lab Interpretation Normal                                 



             (test code = 42987-7)                                        



[facility]





             Test Item    Value        Reference Range Interpretation Comments

 

             LIPASE (test code = 4061047728) 15 U/L       0-220                 

    

 

             Lab Interpretation (test code = Normal                             

    



             98124-0)                                            



[facility] (ALB, T.PRO, BILI T, 
BU/BC, ALT, AST, ALK PHOS)2020 02:34:00





             Test Item    Value        Reference Range Interpretation Comments

 

             TOTAL BILI (test code = 8280667037) 0.7 mg/dL    0.1-1.1           

        

 

             BILI UNCON (test code = 3541447184) 0.8 mg/dL    0.1-1.1           

        

 

             BILI CONJ (test code = 6374884949) 0.0 mg/dL    0-0.3              

       

 

             T PROTEIN (test code = 9267019217) 8.8 g/dL     6.3-8.2      H     

       

 

             ALBUMIN (test code = 9053653967) 5.2 g/dL     3.5-5        H       

     

 

             ALK PHOS (test code = 7953399831) 91 U/L                     

      

 

             ALTv (test code = 1742-6) 36 U/L       5-50                      

 

             AST(SGOT) (test code = 2591793781) 30 U/L       13-40              

       

 

             Lab Interpretation (test code = Abnormal                           

    



             87149-9)                                            



[facility] 02:28:00





             Test Item    Value        Reference Range Interpretation Comments

 

             APPEARANCE (test code = Clear        Clear                     



             8598119804)                                         

 

             COLOR (test code = Yellow       Yellow                    



             9838096240)                                         

 

             PH (test code =              4.8-8.0                   



             8457700490)                                         

 

             SP GRAVITY (test code =              1.003-1.030  H            



             6053838594)                                         

 

             GLU U QUAL (test code = Normal       Normal                    



             6535456644)                                         

 

             BLOOD (test code = Negative     Negative                  



             4755666505)                                         

 

             KETONES (test code = 80 mg/dL     Negative     A            



             6754693767)                                         

 

             PROTEIN (test code = 30 mg/dL     Negative     A            



             2887-8)                                             

 

             UROBILIN (test code = Normal       Normal                    



             9363111259)                                         

 

             BILIRUBIN (test code = Negative     Negative                  



             7257945746)                                         

 

             NITRITE (test code = Negative     Negative                  



             6063161152)                                         

 

             LEUK ROSS (test code = Negative     Negative                  



             1987017264)                                         

 

             RBC/HPF (test code =              See_Comment                [Autom

ated message]



             2185670558)                                         The system [employer]



                                                                 generated this 

result



                                                                 transmitted ref

erence



                                                                 range: 0 - 3 HP

F. The



                                                                 reference range

 was



                                                                 not used to int

erpret



                                                                 this result as



                                                                 normal/abnormal

.

 

             WBC/HPF (test code = <1           See_Comment                [Autom

ated message]



             8184989488)                                         The system [employer]



                                                                 generated this 

result



                                                                 transmitted ref

erence



                                                                 range: 0 - 5 HP

F. The



                                                                 reference range

 was



                                                                 not used to int

erpret



                                                                 this result as



                                                                 normal/abnormal

.

 

             BACTERIA (test code = Negative     Negative                  



             0375600227)                                         

 

             MUCOUS (test code = Moderate     Negative LPF A            



             7128809790)                                         

 

             HYAL CAST (test code =              See_Comment                [Aut

omated message]



             3937110604)                                         The system [employer]



                                                                 generated this 

result



                                                                 transmitted ref

erence



                                                                 range: <=2 LPF.

 The



                                                                 reference range

 was



                                                                 not used to int

erpret



                                                                 this result as



                                                                 normal/abnormal

.

 

             Lab Interpretation (test Abnormal                               



             code = 76989-6)                                        



[facility] (NA, K, CL, CO2, 
GLUCOSE, BUN, CREATININE, CA)2020 02:17:00





             Test Item    Value        Reference Range Interpretation Comments

 

             NA (test code = 140 mmol/L   135-145                   



             6627986126)                                         

 

             K (test code = 3.9 mmol/L   3.5-5                     



             4658053103)                                         

 

             CL (test code = 102 mmol/L                       



             6124319860)                                         

 

             CO2 TOTAL (test code = 24 mmol/L    23-31                     



             9928368699)                                         

 

             AGAP (test code =              2-16                      



             0675272291)                                         

 

             BUN (test code = 13 mg/dL     7-23                      



             8140581178)                                         

 

             GLUCOSE (test code = 116 mg/dL           H            



             1383906360)                                         

 

             CREATININE (test code = 0.68 mg/dL   0.6-1.25                  



             5700572346)                                         

 

             CALCIUM (test code = 9.9 mg/dL    8.6-10.6                  



             1903497277)                                         

 

             eGFR Calculation              mL/min/1.73m2              



             (Non-)                                        



             (test code =                                        



             5818656178)                                         

 

             eGFR Calculation              mL/min/1.73m2              



             (African American)                                        



             (test code =                                        



             8513837048)                                         

 

             RICKEY (test code = RICKEY) Association of                           



                          Glomerular Filtration                           



                          Rate (GFR) and Staging                           



                          of Kidney Disease*                           



                          +---------------------                           



                          --+-------------------                           



                          --+-------------------                           



                          ------+| GFR                           



                          (mL/min/1.73 m2) ?|                           



                          With Kidney Damage ?|                           



                          ?Without Kidney                           



                          Damage+---------------                           



                          --------+-------------                           



                          --------+-------------                           



                          ------------+| ?>90 ?                           



                          ? ? ? ? ? ? ? ?|                           



                          ?Stage one ? ? ? ? ?|                           



                          ? Normal ? ? ? ? ? ? ?                           



                          ?+--------------------                           



                          ---+------------------                           



                          ---+------------------                           



                          -------+| ?60-89 ? ? ?                           



                          ? ? ? ? ?| ?Stage two                           



                          ? ? ? ? ?| ? Decreased                           



                          GFR ? ? ? ?                            



                          +---------------------                           



                          --+-------------------                           



                          --+-------------------                           



                          ------+| ?30-59 ? ? ?                           



                          ? ? ? ? ?| ?Stage                           



                          three ? ? ? ?| ? Stage                           



                          three ? ? ? ? ?                           



                          +---------------------                           



                          --+-------------------                           



                          --+-------------------                           



                          ------+| ?15-29 ? ? ?                           



                          ? ? ? ? ?| ?Stage four                           



                          ? ? ? ? | ? Stage four                           



                          ? ? ? ? ?                              



                          ?+--------------------                           



                          ---+------------------                           



                          ---+------------------                           



                          -------+| ?<15 (or                           



                          dialysis) ? ?| ?Stage                           



                          five ? ? ? ? | ? Stage                           



                          five ? ? ? ? ?                           



                          ?+--------------------                           



                          ---+------------------                           



                          ---+------------------                           



                          -------+ *Each stage                           



                          assumes the associated                           



                          GFR level has been in                           



                          effect for at least                           



                          three months. ?Stages                           



                          1 to 5, with or                           



                          without kidney                           



                          disease, indicate                           



                          chronic kidney                           



                          disease. Notes:                           



                          Determination of                           



                          stages one and two                           



                          (with eGFR                             



                          >59mL/min/1.73 m2)                           



                          requires estimation of                           



                          kidney damage for at                           



                          least three months as                           



                          defined by structural                           



                          or functional                           



                          abnormalities of the                           



                          kidney, manifested by                           



                          either:Pathological                           



                          abnormalities or                           



                          Markers of kidney                           



                          damage (including                           



                          abnormalities in the                           



                          composition of the                           



                          blood or urine or                           



                          abnormalities in                           



                          imaging tests).                           

 

             Lab Interpretation Abnormal                               



             (test code = 36772-7)                                        



[facility] with [id number] 01:41:00





             Test Item    Value        Reference Range Interpretation Comments

 

             WBC (test code =              See_Comment  H             [Automated



             0268-2)                                             message] The



                                                                 system which



                                                                 generated this



                                                                 result transmit

[place]



                                                                 reference range

:



                                                                 4.50 - 13.50



                                                                 10*3/?L. The



                                                                 reference range



                                                                 was not used to



                                                                 interpret this



                                                                 result as



                                                                 normal/abnormal

.

 

             RBC (test code =              See_Comment                [Automated



             598-2)                                              message] The



                                                                 system which



                                                                 generated this



                                                                 result transmit

[place]



                                                                 reference range

:



                                                                 4.50 - 5.30



                                                                 10*6/?L. The



                                                                 reference range



                                                                 was not used to



                                                                 interpret this



                                                                 result as



                                                                 normal/abnormal

.

 

             HGB (test code = 15.4 g/dL    13-16                     



             718-7)                                              

 

             HCT (test code = 44.8 %       37-49                     



             4544-3)                                             

 

             MCV (test code = 84.5 fL      78-95                     



             787-2)                                              

 

             MCH (test code = 29.1 pg      26-32                     



             785-6)                                              

 

             MCHC (test code = 34.4 g/dL    32-36                     



             786-4)                                              

 

             RDW-SD (test code = 37.9 fL      38.5-49      L            



             35097-6)                                            

 

             RDW-CV (test code = 12.4 %       11.5-14                   



             788-0)                                              

 

             PLT (test code =              See_Comment  H             [Automated



             777-3)                                              message] The



                                                                 system which



                                                                 generated this



                                                                 result transmit

[place]



                                                                 reference range

:



                                                                 133 - 320 10*3/

?L.



                                                                 The reference



                                                                 range was not u

sed



                                                                 to interpret th

is



                                                                 result as



                                                                 normal/abnormal

.

 

             MPV (test code = 9.3 fL       9.3-12.9                  



             55533-6)                                            

 

             NRBC/100 WBC (test              See_Comment                [Automat

ed



             code = [id number])                                        message] 

The



                                                                 system which



                                                                 generated this



                                                                 result transmit

[place]



                                                                 reference range

:



                                                                 0.0 - 10.0 /100



                                                                 WBCs. The



                                                                 reference range



                                                                 was not used to



                                                                 interpret this



                                                                 result as



                                                                 normal/abnormal

.

 

             NRBC x10^3 (test code <0.01        See_Comment                [Auto

mated



             = [id number])                                        message] The



                                                                 system which



                                                                 generated this



                                                                 result transmit

[place]



                                                                 reference range

:



                                                                 10*3/?L. The



                                                                 reference range



                                                                 was not used to



                                                                 interpret this



                                                                 result as



                                                                 normal/abnormal

.

 

             GRAN MAT (NEUT) % 85.5 %                                 



             (test code = 770-8)                                        

 

             IMM GRAN % (test code 0.50 %                                 



             = [id number])                                        

 

             LYMPH % (test code = 7.9 %                                  



             736-9)                                              

 

             MONO % (test code = 5.8 %                                  



             5905-5)                                             

 

             EOS % (test code = 0.1 %                                  



             713-8)                                              

 

             BASO % (test code = 0.2 %                                  



             706-2)                                              

 

             GRAN MAT x10^3(ANC) 12.93 10*3/uL 1.5-10.3     H            



             (test code =                                        



             [id number])                                         

 

             IMM GRAN x10^3 (test 0.08 10*3/uL 0-0.06       H            



             code = [id number])                                        

 

             LYMPH x10^3 (test code 1.19 10*3/uL 0.7-7.4                   



             = 731-0)                                            

 

             MONO x10^3 (test code 0.87 10*3/uL 0-0.5        H            



             = 742-7)                                            

 

             EOS x10^3 (test code = <0.03        0-0.4                     



             711-2)                                              

 

             BASO x10^3 (test code 0.03 10*3/uL 0-0.1                     



             = 704-7)                                            

 

             Lab Interpretation Abnormal                               



             (test code = 16159-1)                                        



[facility]

## 2023-03-23 VITALS — DIASTOLIC BLOOD PRESSURE: 81 MMHG | SYSTOLIC BLOOD PRESSURE: 136 MMHG | TEMPERATURE: 98.1 F | OXYGEN SATURATION: 100 %

## 2023-03-23 NOTE — ER
Nurse's Notes                                                                                     

 [facility]                                                                 

Name: Reynold Mcmillan                                                                          

Age: 22 yrs                                                                                       

Sex: Male                                                                                         

: 2000                                                                                   

MRN: [id number]                                                                                   

Arrival Date: 2023                                                                          

Time: 20:15                                                                                       

Account#: [id number]                                                                            

Bed External Waiting                                                                              

Private MD:                                                                                       

Diagnosis: Acute pharyngitis, unspecified                                                         

                                                                                                  

Presentation:                                                                                     

                                                                                             

20:30 Chief complaint: Patient states: sore throat, cough, cold sweats for 1 week. last 3     lg3 

      days throat is feeling worse and now there's something hanging in the back of my            

      throat. Coronavirus screen: Client denies travel out of the [place] in the last 14 days.       

      Client presents with at least one sign or symptom that may indicate coronavirus-19.         

      Standard/surgical mask placed on the client. Ebola Screen: No symptoms or risks             

      identified at this time. Initial Sepsis Screen: Does the patient meet any 2 criteria?       

      No. Patient's initial sepsis screen is negative. Does the patient have a suspected          

      source of infection? No. Patient's initial sepsis screen is negative. Risk Assessment:      

      Do you want to hurt yourself or someone else? Patient reports no desire to harm self or     

      others. Onset of symptoms is unknown.                                                       

20:30 Method Of Arrival: Ambulatory                                                           lg3 

20:30 Acuity: ROSIE 4                                                                           lg3 

                                                                                                  

Triage Assessment:                                                                                

20:32 General: Appears in no apparent distress. comfortable, Behavior is calm, cooperative.   lg3 

      Pain: Complains of pain in throat. EENT: Reports difficulty swallowing. Neuro: No           

      deficits noted. Boateng Agitation-Sedation Scale (RASS): 0 - Alert and Calm Level of       

      Consciousness is awake, alert, obeys commands, Oriented to person, place, time,             

      situation. Cardiovascular: No deficits noted. Respiratory: No deficits noted. GI: No        

      deficits noted. No signs and/or symptoms were reported involving the gastrointestinal       

      system. : No deficits noted. No signs and/or symptoms were reported regarding the         

      genitourinary system. Derm: No deficits noted. No signs and/or symptoms reported            

      regarding the dermatologic system. Skin is intact, is healthy with good turgor, Skin is     

      dry, Skin is normal. Musculoskeletal: No deficits noted. No signs and/or symptoms           

      reported regarding the musculoskeletal system. Circulation, motion, and sensation           

      intact. Range of motion: intact in all extremities.                                         

                                                                                                  

Historical:                                                                                       

- Allergies:                                                                                      

20:32 No Known Allergies;                                                                     lg3 

- Home Meds:                                                                                      

20:32 melatonin Oral [Active];                                                                lg3 

- PMHx:                                                                                           

20:32 None;                                                                                   lg3 

- PSHx:                                                                                           

20:32 Appendectomy;                                                                           lg3 

                                                                                                  

- Immunization history:: Adult Immunizations up to date, Client reports having NOT                

  received the Covid vaccine. Flu vaccine is not up to date.                                      

- Social history:: Smoking status: Reported history of juuling and/or vaping.                     

  Patient/guardian denies using alcohol, street drugs.                                            

                                                                                                  

                                                                                                  

Assessment:                                                                                       

20:45 Reassessment: see triage assessment.                                                    mb9 

20:56 Reassessment: Pt declined medicine and swabs. Educated pt about wait time and pt states mb9 

      "I need to leave to get back to work. I don't want to wait for the test results. I'll       

      come back when I have more time." Asked pt to wait until Roma HERNANDEZ, can come and speak       

      to him before leaving. Pt became angry and started pacing. Pt walked out of room            

      cussing "you need to get another fucking job. This is your fucking job.".                   

                                                                                                  

Vital Signs:                                                                                      

20:30  / 81; Pulse 81; Resp 17 S; Temp 98.1(O); Pulse Ox 100% on R/A; Weight 90.72 kg   lg3 

      (R); Height 5 ft. 7 in. (R); Pain 5/10;                                                     

20:30 Body Mass Index 31.32 (90.72 kg, 170.18 cm)                                             lg3 

20:30 Pain Scale: Adult                                                                       lg3 

                                                                                                  

ED Course:                                                                                        

20:15 Patient arrived in ED.                                                                  mr  

20:17 Fabrizio Brandon PA is PHCP.                                                                cp  

20:17 Aleksandar Plascencia MD is Attending Physician.                                                cp  

20:32 Triage completed.                                                                       lg3 

20:44 Rolanda Bryan, RN is Primary Nurse.                                               mb9 

20:44 Arm band placed on.                                                                     mb9 

20:45 No provider procedures requiring assistance completed.                                  mb9 

                                                                                                  

Administered Medications:                                                                         

21:10 Not Given (Patient Refused): GI Cocktail without Donnatal - (Maalox PO Suspension 30    mb9 

      ml, Lidocaine Mucous Membrane Liquid 2 % 15 ml) PO once; swish, gargle swallow              

                                                                                                  

                                                                                                  

Medication:                                                                                       

20:45 VIS not applicable for this client.                                                     mb9 

                                                                                                  

Outcome:                                                                                          

21:50 Discharge ordered by MD.                                                                cp  

23:05 Patient left the ED.                                                                    kl  

                                                                                                  

Signatures:                                                                                       

Fely Guzman RN                     RN   nicolle Mullen, Rolanda                                 mr                                                   

Fabrizio Brandon, Marion Carpenter cp, RN                       RN   lg3                                                  

Rolanda Bryan, RN                 RN   mb9                                                  

                                                                                                  

Corrections: (The following items were deleted from the chart)                                    

21:09 20:56 Reassessment: Pt declined medicine and swabs. Educated pt about wait time and pt  mb9 

      states "I need to leave to get back to work. I don't want to wait for the test results.     

      I'll come back when I have more time." Asked pt to wait until Roma HERNANDEZ, can come and        

      speak before leaving. Pt became angry and started pacing. Pt walked out of room cussing     

      "you need to get another fucking job. This is your fucking job." mb9                        

                                                                                                  

**************************************************************************************************